# Patient Record
Sex: MALE | Race: WHITE | NOT HISPANIC OR LATINO | Employment: OTHER | URBAN - METROPOLITAN AREA
[De-identification: names, ages, dates, MRNs, and addresses within clinical notes are randomized per-mention and may not be internally consistent; named-entity substitution may affect disease eponyms.]

---

## 2023-03-14 ENCOUNTER — TELEPHONE (OUTPATIENT)
Dept: PRIMARY CARE | Facility: CLINIC | Age: 73
End: 2023-03-14
Payer: MEDICARE

## 2023-10-13 ENCOUNTER — TELEPHONE (OUTPATIENT)
Dept: HEMATOLOGY/ONCOLOGY | Facility: CLINIC | Age: 73
End: 2023-10-13
Payer: MEDICARE

## 2023-10-13 DIAGNOSIS — C61 MALIGNANT NEOPLASM OF PROSTATE (MULTI): Primary | ICD-10-CM

## 2023-10-13 RX ORDER — ENZALUTAMIDE 40 MG/1
160 CAPSULE ORAL DAILY
COMMUNITY
Start: 2023-02-05 | End: 2023-10-13 | Stop reason: SDUPTHER

## 2023-10-13 RX ORDER — ENZALUTAMIDE 40 MG/1
160 CAPSULE ORAL DAILY
Qty: 120 CAPSULE | Refills: 3 | Status: SHIPPED
Start: 2023-10-13 | End: 2023-10-17 | Stop reason: SDUPTHER

## 2023-10-13 NOTE — PROGRESS NOTES
Jose George is a 73 y.o. year old male patient who had a patient care encounter with Suzette Cuadra PA-C on 9/8/23 for ongoing management of Malignant neoplasm of prostate (CMS/HCC) [C61] .  Jose is currently being treated with enzalutamide and leuprolide.    Labs from 9/6/23:  WBC 4.1, ANC 2.45, H/H 13.3/38.8, PLTs 254;  CMP stable.  PSA 0.27.  Continue current therapy.    Per collaborative practice agreement with Dr. Garza, on 10/13/23  I refilled enzalutamide 160 mg (4 x 40 mg) PO once daily, 30 day continuous cycle.  Qty # 120 with 3 refill.  The prescription was sent to Dr. Garza for approval, pending subsequent electronic transmission to [facility].    Jose is currently enrolled in a 's drug program through 12/31/23.    Next FUV is 12/1/23.    Carroll Scott RPh, MS, BCOP  Clinical Pharmacist Specialist - Ambulatory Oncology

## 2023-10-17 DIAGNOSIS — C61 MALIGNANT NEOPLASM OF PROSTATE (MULTI): ICD-10-CM

## 2023-10-17 RX ORDER — ENZALUTAMIDE 40 MG/1
160 CAPSULE ORAL DAILY
Qty: 120 CAPSULE | Refills: 3 | Status: SHIPPED | OUTPATIENT
Start: 2023-10-17

## 2023-10-18 ENCOUNTER — TELEPHONE (OUTPATIENT)
Dept: HEMATOLOGY/ONCOLOGY | Facility: CLINIC | Age: 73
End: 2023-10-18
Payer: MEDICARE

## 2023-10-18 PROBLEM — C61: Status: ACTIVE | Noted: 2023-10-18

## 2023-10-18 PROBLEM — R35.1 NOCTURIA: Status: ACTIVE | Noted: 2023-10-18

## 2023-10-18 PROBLEM — R35.0 INCREASED URINARY FREQUENCY: Status: ACTIVE | Noted: 2023-10-18

## 2023-10-18 PROBLEM — C79.51: Status: ACTIVE | Noted: 2023-10-18

## 2023-10-18 PROBLEM — R58 ECCHYMOSIS: Status: ACTIVE | Noted: 2023-10-18

## 2023-10-18 PROBLEM — T14.8XXA BRUISING: Status: ACTIVE | Noted: 2023-10-18

## 2023-10-18 PROBLEM — I82.409 DVT, LOWER EXTREMITY (MULTI): Status: ACTIVE | Noted: 2023-10-18

## 2023-10-18 RX ORDER — AMOXICILLIN 500 MG/1
2000 TABLET, FILM COATED ORAL
COMMUNITY
Start: 2023-06-29 | End: 2024-02-01 | Stop reason: ALTCHOICE

## 2023-10-18 RX ORDER — LEUPROLIDE ACETATE 45 MG
KIT INTRAMUSCULAR
COMMUNITY
Start: 2022-05-19

## 2023-10-18 RX ORDER — PANTOPRAZOLE SODIUM 20 MG/1
TABLET, DELAYED RELEASE ORAL
COMMUNITY
Start: 2023-03-27 | End: 2024-02-01 | Stop reason: ALTCHOICE

## 2023-10-18 RX ORDER — MELOXICAM 15 MG/1
TABLET ORAL
COMMUNITY
Start: 2023-03-27 | End: 2024-01-02 | Stop reason: ALTCHOICE

## 2023-10-18 RX ORDER — OXYCODONE HYDROCHLORIDE 5 MG/1
TABLET ORAL
COMMUNITY
Start: 2023-10-12 | End: 2024-02-01 | Stop reason: ALTCHOICE

## 2023-10-18 RX ORDER — NALOXONE HYDROCHLORIDE 4 MG/.1ML
4 SPRAY NASAL AS NEEDED
COMMUNITY
Start: 2023-03-27 | End: 2024-01-02 | Stop reason: ALTCHOICE

## 2023-10-18 RX ORDER — MUPIROCIN 20 MG/G
OINTMENT TOPICAL 2 TIMES DAILY
COMMUNITY
Start: 2023-03-06 | End: 2024-02-01 | Stop reason: ALTCHOICE

## 2023-10-18 RX ORDER — HYDROXYZINE HYDROCHLORIDE 10 MG/1
1-4 TABLET, FILM COATED ORAL NIGHTLY
COMMUNITY
Start: 2023-05-17 | End: 2024-01-02 | Stop reason: ALTCHOICE

## 2023-10-18 RX ORDER — DUPILUMAB 300 MG/2ML
INJECTION, SOLUTION SUBCUTANEOUS ONCE
COMMUNITY
Start: 2022-05-19

## 2023-10-18 RX ORDER — MOMETASONE FUROATE 1 MG/G
CREAM TOPICAL 3 TIMES DAILY
COMMUNITY
Start: 2023-05-17 | End: 2024-02-01 | Stop reason: ALTCHOICE

## 2023-10-18 NOTE — TELEPHONE ENCOUNTER
Spoke with the patient. Provided update from Carroll- pharmacist- below. Pt is aware of the change of pharmacy and will call them today (has phone number) to verify they received the script and set up delivery. Pt had no further questions or concerns at this time.

## 2023-10-19 ENCOUNTER — LAB (OUTPATIENT)
Dept: LAB | Facility: CLINIC | Age: 73
End: 2023-10-19
Payer: MEDICARE

## 2023-10-19 ENCOUNTER — TELEPHONE (OUTPATIENT)
Dept: HEMATOLOGY/ONCOLOGY | Facility: CLINIC | Age: 73
End: 2023-10-19
Payer: MEDICARE

## 2023-10-19 DIAGNOSIS — C61 PROSTATE CANCER (MULTI): ICD-10-CM

## 2023-10-19 DIAGNOSIS — C61 PROSTATE CANCER (MULTI): Primary | ICD-10-CM

## 2023-10-19 LAB
ALBUMIN SERPL BCP-MCNC: 3.8 G/DL (ref 3.4–5)
ALP SERPL-CCNC: 68 U/L (ref 33–136)
ALT SERPL W P-5'-P-CCNC: 7 U/L (ref 10–52)
ANION GAP SERPL CALC-SCNC: 13 MMOL/L (ref 10–20)
AST SERPL W P-5'-P-CCNC: 12 U/L (ref 9–39)
BASOPHILS # BLD AUTO: 0.03 X10*3/UL (ref 0–0.1)
BASOPHILS NFR BLD AUTO: 0.7 %
BILIRUB SERPL-MCNC: 0.5 MG/DL (ref 0–1.2)
BUN SERPL-MCNC: 15 MG/DL (ref 6–23)
CALCIUM SERPL-MCNC: 9.4 MG/DL (ref 8.6–10.3)
CHLORIDE SERPL-SCNC: 103 MMOL/L (ref 98–107)
CO2 SERPL-SCNC: 26 MMOL/L (ref 21–32)
CREAT SERPL-MCNC: 0.8 MG/DL (ref 0.5–1.3)
EOSINOPHIL # BLD AUTO: 0.15 X10*3/UL (ref 0–0.4)
EOSINOPHIL NFR BLD AUTO: 3.5 %
ERYTHROCYTE [DISTWIDTH] IN BLOOD BY AUTOMATED COUNT: 13.4 % (ref 11.5–14.5)
GFR SERPL CREATININE-BSD FRML MDRD: >90 ML/MIN/1.73M*2
GLUCOSE SERPL-MCNC: 96 MG/DL (ref 74–99)
HCT VFR BLD AUTO: 40.7 % (ref 41–52)
HGB BLD-MCNC: 13.7 G/DL (ref 13.5–17.5)
IMM GRANULOCYTES # BLD AUTO: 0.04 X10*3/UL (ref 0–0.5)
IMM GRANULOCYTES NFR BLD AUTO: 0.9 % (ref 0–0.9)
LYMPHOCYTES # BLD AUTO: 0.83 X10*3/UL (ref 0.8–3)
LYMPHOCYTES NFR BLD AUTO: 19.4 %
MCH RBC QN AUTO: 31.1 PG (ref 26–34)
MCHC RBC AUTO-ENTMCNC: 33.7 G/DL (ref 32–36)
MCV RBC AUTO: 93 FL (ref 80–100)
MONOCYTES # BLD AUTO: 0.31 X10*3/UL (ref 0.05–0.8)
MONOCYTES NFR BLD AUTO: 7.2 %
NEUTROPHILS # BLD AUTO: 2.92 X10*3/UL (ref 1.6–5.5)
NEUTROPHILS NFR BLD AUTO: 68.3 %
PLATELET # BLD AUTO: 255 X10*3/UL (ref 150–450)
PMV BLD AUTO: 9.8 FL (ref 7.5–11.5)
POTASSIUM SERPL-SCNC: 4.1 MMOL/L (ref 3.5–5.3)
PROT SERPL-MCNC: 6.6 G/DL (ref 6.4–8.2)
PSA SERPL-MCNC: 0.59 NG/ML
RBC # BLD AUTO: 4.4 X10*6/UL (ref 4.5–5.9)
SODIUM SERPL-SCNC: 138 MMOL/L (ref 136–145)
WBC # BLD AUTO: 4.3 X10*3/UL (ref 4.4–11.3)

## 2023-10-19 PROCEDURE — 84153 ASSAY OF PSA TOTAL: CPT | Mod: CMCLAB

## 2023-10-19 PROCEDURE — 80053 COMPREHEN METABOLIC PANEL: CPT

## 2023-10-19 PROCEDURE — 85025 COMPLETE CBC W/AUTO DIFF WBC: CPT

## 2023-10-19 PROCEDURE — 36415 COLL VENOUS BLD VENIPUNCTURE: CPT

## 2023-10-20 NOTE — TELEPHONE ENCOUNTER
Spoke with the patient. Was told by his surgeon that he just needed to call Dr. Garza and let him know that patient is having an elbow surgery on Wednesday. Pt had no further questions or concerns at this time. Aware of Dr. Garza's response below.

## 2023-11-20 ENCOUNTER — TELEPHONE (OUTPATIENT)
Dept: UROLOGY | Facility: CLINIC | Age: 73
End: 2023-11-20
Payer: MEDICARE

## 2023-11-29 ENCOUNTER — LAB (OUTPATIENT)
Dept: LAB | Facility: CLINIC | Age: 73
End: 2023-11-29
Payer: MEDICARE

## 2023-11-29 DIAGNOSIS — C61 MALIGNANT NEOPLASM OF PROSTATE (MULTI): ICD-10-CM

## 2023-11-29 LAB
ALBUMIN SERPL BCP-MCNC: 4 G/DL (ref 3.4–5)
ALP SERPL-CCNC: 72 U/L (ref 33–136)
ALT SERPL W P-5'-P-CCNC: 6 U/L (ref 10–52)
ANION GAP SERPL CALC-SCNC: 11 MMOL/L (ref 10–20)
AST SERPL W P-5'-P-CCNC: 11 U/L (ref 9–39)
BASOPHILS # BLD AUTO: 0.03 X10*3/UL (ref 0–0.1)
BASOPHILS NFR BLD AUTO: 0.7 %
BILIRUB SERPL-MCNC: 0.5 MG/DL (ref 0–1.2)
BUN SERPL-MCNC: 20 MG/DL (ref 6–23)
CALCIUM SERPL-MCNC: 9.5 MG/DL (ref 8.6–10.3)
CHLORIDE SERPL-SCNC: 102 MMOL/L (ref 98–107)
CO2 SERPL-SCNC: 28 MMOL/L (ref 21–32)
CREAT SERPL-MCNC: 0.89 MG/DL (ref 0.5–1.3)
EOSINOPHIL # BLD AUTO: 0.19 X10*3/UL (ref 0–0.4)
EOSINOPHIL NFR BLD AUTO: 4.5 %
ERYTHROCYTE [DISTWIDTH] IN BLOOD BY AUTOMATED COUNT: 12.9 % (ref 11.5–14.5)
GFR SERPL CREATININE-BSD FRML MDRD: >90 ML/MIN/1.73M*2
GLUCOSE SERPL-MCNC: 84 MG/DL (ref 74–99)
HCT VFR BLD AUTO: 41 % (ref 41–52)
HGB BLD-MCNC: 13.8 G/DL (ref 13.5–17.5)
IMM GRANULOCYTES # BLD AUTO: 0.02 X10*3/UL (ref 0–0.5)
IMM GRANULOCYTES NFR BLD AUTO: 0.5 % (ref 0–0.9)
LYMPHOCYTES # BLD AUTO: 1.01 X10*3/UL (ref 0.8–3)
LYMPHOCYTES NFR BLD AUTO: 24 %
MCH RBC QN AUTO: 30.7 PG (ref 26–34)
MCHC RBC AUTO-ENTMCNC: 33.7 G/DL (ref 32–36)
MCV RBC AUTO: 91 FL (ref 80–100)
MONOCYTES # BLD AUTO: 0.48 X10*3/UL (ref 0.05–0.8)
MONOCYTES NFR BLD AUTO: 11.4 %
NEUTROPHILS # BLD AUTO: 2.47 X10*3/UL (ref 1.6–5.5)
NEUTROPHILS NFR BLD AUTO: 58.9 %
PLATELET # BLD AUTO: 253 X10*3/UL (ref 150–450)
POTASSIUM SERPL-SCNC: 4.2 MMOL/L (ref 3.5–5.3)
PROT SERPL-MCNC: 6.7 G/DL (ref 6.4–8.2)
PSA SERPL-MCNC: 1.12 NG/ML
RBC # BLD AUTO: 4.5 X10*6/UL (ref 4.5–5.9)
SODIUM SERPL-SCNC: 137 MMOL/L (ref 136–145)
WBC # BLD AUTO: 4.2 X10*3/UL (ref 4.4–11.3)

## 2023-11-29 PROCEDURE — 85025 COMPLETE CBC W/AUTO DIFF WBC: CPT

## 2023-11-29 PROCEDURE — 84153 ASSAY OF PSA TOTAL: CPT

## 2023-11-29 PROCEDURE — 80053 COMPREHEN METABOLIC PANEL: CPT

## 2023-11-29 PROCEDURE — 36415 COLL VENOUS BLD VENIPUNCTURE: CPT

## 2023-11-30 ENCOUNTER — OFFICE VISIT (OUTPATIENT)
Dept: UROLOGY | Facility: CLINIC | Age: 73
End: 2023-11-30
Payer: MEDICARE

## 2023-11-30 VITALS
WEIGHT: 182 LBS | TEMPERATURE: 97.3 F | DIASTOLIC BLOOD PRESSURE: 75 MMHG | SYSTOLIC BLOOD PRESSURE: 108 MMHG | HEART RATE: 73 BPM | BODY MASS INDEX: 29.38 KG/M2

## 2023-11-30 DIAGNOSIS — C61 NEOPLASM OF PROSTATE, DISTANT METASTASIS STAGING CATEGORY M1B: METASTASIS TO BONE (MULTI): ICD-10-CM

## 2023-11-30 DIAGNOSIS — C79.51 NEOPLASM OF PROSTATE, DISTANT METASTASIS STAGING CATEGORY M1B: METASTASIS TO BONE (MULTI): ICD-10-CM

## 2023-11-30 PROCEDURE — 1036F TOBACCO NON-USER: CPT | Performed by: UROLOGY

## 2023-11-30 PROCEDURE — 99214 OFFICE O/P EST MOD 30 MIN: CPT | Performed by: UROLOGY

## 2023-11-30 PROCEDURE — 96402 CHEMO HORMON ANTINEOPL SQ/IM: CPT | Performed by: UROLOGY

## 2023-11-30 PROCEDURE — 1159F MED LIST DOCD IN RCRD: CPT | Performed by: UROLOGY

## 2023-11-30 NOTE — PROGRESS NOTES
Subjective   Patient ID: Jose George is a 73 y.o. male who presents for lupron injection. Last seen 5/9/23 when Patient received a 6-month Lupron injection today. He will follow-up in the fall for repeat injection. In the interim, he will continue to follow with Dr. Garza from medical oncology.      HPI  The patient is accompanied by his daughter  Most recent PSA is 1.12 on 11/29/23, up from 0.27 on 9/6/23,   The patient reports no new urinary symptoms and denies issues with flow, hematuria, and dysuria.     Patient is still on Xtandi.     Review of Systems  A 12 system review was completed and is negative with the exception of those signs and symptoms noted in the history of present illness.    Objective   Physical Exam  General: in NAD, appears stated age  Head: normocephalic, atraumatic  Respiratory: normal effort, no use of accessory muscles  Cardiovascular: no edema noted  Skin: normal turgor, no rashes  Neurologic: grossly intact, oriented to person/place/time  Psychiatric: mode and affect appropriate     Assessment/Plan   Problem List Items Addressed This Visit             ICD-10-CM       Hematology and Neoplasia    Neoplasm of prostate, distant metastasis staging category M1b: metastasis to bone (CMS/HCC) C61, C79.51    Relevant Medications    leuprolide (6-month) (Lupron Depot) injection 45 mg (Completed) (Start on 11/30/2023  4:30 PM)     Patient is still on Xtandi. Patient will consult with Dr. Garza with his PSA going up. We discussed that patients can become resistant to hormone suppression. Patient will get his Lupron injection today. We will not make a 6 month follow-up at this time. He will consult with Dr. Garza and possible have further Lupron injections there.   All questions and concerns were addressed. Patient verbalizes understanding and has no other questions at this time.      Scribe Attestation  By signing my name below, I, Lucía Cedillo , Leilani   attest that this documentation has been  prepared under the direction and in the presence of Luis Miguel Dumont MD.

## 2023-12-01 ENCOUNTER — OFFICE VISIT (OUTPATIENT)
Dept: HEMATOLOGY/ONCOLOGY | Facility: CLINIC | Age: 73
End: 2023-12-01
Payer: MEDICARE

## 2023-12-01 VITALS
BODY MASS INDEX: 29.43 KG/M2 | HEART RATE: 67 BPM | TEMPERATURE: 98.1 F | DIASTOLIC BLOOD PRESSURE: 84 MMHG | SYSTOLIC BLOOD PRESSURE: 128 MMHG | RESPIRATION RATE: 16 BRPM | OXYGEN SATURATION: 95 % | WEIGHT: 182.32 LBS

## 2023-12-01 DIAGNOSIS — C79.51 PROSTATE CANCER METASTATIC TO BONE (MULTI): ICD-10-CM

## 2023-12-01 DIAGNOSIS — C79.51 NEOPLASM OF PROSTATE, DISTANT METASTASIS STAGING CATEGORY M1B: METASTASIS TO BONE (MULTI): Primary | ICD-10-CM

## 2023-12-01 DIAGNOSIS — Z90.79 ACQUIRED ABSENCE OF OTHER GENITAL ORGAN(S): ICD-10-CM

## 2023-12-01 DIAGNOSIS — L30.9 ECZEMA, UNSPECIFIED TYPE: ICD-10-CM

## 2023-12-01 DIAGNOSIS — C61 NEOPLASM OF PROSTATE, DISTANT METASTASIS STAGING CATEGORY M1B: METASTASIS TO BONE (MULTI): Primary | ICD-10-CM

## 2023-12-01 DIAGNOSIS — I82.532 CHRONIC DEEP VEIN THROMBOSIS (DVT) OF POPLITEAL VEIN OF LEFT LOWER EXTREMITY (MULTI): ICD-10-CM

## 2023-12-01 DIAGNOSIS — C61 PROSTATE CANCER METASTATIC TO BONE (MULTI): ICD-10-CM

## 2023-12-01 PROCEDURE — 99214 OFFICE O/P EST MOD 30 MIN: CPT | Performed by: INTERNAL MEDICINE

## 2023-12-01 PROCEDURE — 1036F TOBACCO NON-USER: CPT | Performed by: INTERNAL MEDICINE

## 2023-12-01 PROCEDURE — 1125F AMNT PAIN NOTED PAIN PRSNT: CPT | Performed by: INTERNAL MEDICINE

## 2023-12-01 PROCEDURE — 1159F MED LIST DOCD IN RCRD: CPT | Performed by: INTERNAL MEDICINE

## 2023-12-01 ASSESSMENT — ENCOUNTER SYMPTOMS
LOSS OF SENSATION IN FEET: 0
OCCASIONAL FEELINGS OF UNSTEADINESS: 0
DEPRESSION: 0

## 2023-12-01 ASSESSMENT — PAIN SCALES - GENERAL: PAINLEVEL: 2

## 2023-12-01 NOTE — PROGRESS NOTES
Patient ID: Jose George is a 73 y.o. male.  Referring Physician: No referring provider defined for this encounter.  Primary Care Provider: Jatin Soriano DO  Visit Type: Follow Up      Subjective    HPI  My PSA went up     Review of Systems   Constitutional: Negative.    HENT:  Negative.     Eyes: Negative.    Respiratory: Negative.     Cardiovascular: Negative.    Gastrointestinal: Negative.    Endocrine: Negative.    Genitourinary: Negative.     Musculoskeletal: Negative.    Skin: Negative.    Neurological: Negative.    Hematological: Negative.    Psychiatric/Behavioral: Negative.          Objective   BSA: 1.96 meters squared  /84   Pulse 67   Temp 36.7 °C (98.1 °F) (Temporal)   Resp 16   Wt 82.7 kg (182 lb 5.1 oz)   SpO2 95%   BMI 29.43 kg/m²      has a past medical history of Allergic contact dermatitis, unspecified cause, Contact with and (suspected) exposure to covid-19 (12/08/2020), Localized edema (03/05/2021), Nodular prostate without lower urinary tract symptoms (03/25/2021), Olecranon bursitis, left elbow (03/05/2021), Personal history of malignant neoplasm of prostate (05/06/2021), Personal history of other diseases of the circulatory system, Personal history of other diseases of the musculoskeletal system and connective tissue, Personal history of other diseases of urinary system (04/26/2021), Personal history of other specified conditions (03/25/2021), and Sprain of unspecified ligament of left ankle, initial encounter (02/23/2021).   has a past surgical history that includes Other surgical history (12/08/2020) and Other surgical history (12/08/2020).  Family History   Problem Relation Name Age of Onset    Diabetes Mother      Hypertension Mother      Heart attack Mother      Obesity Mother      Other (cardiac disorder) Mother      Diabetes Father      Hypertension Father      Other (cardiac disorder) Father      Lung disease Father      Obesity Father       Oncology History    No  history exists.       Jose George  reports that he has never smoked. He has never used smokeless tobacco.  He  reports that he does not currently use alcohol.  He  reports that he does not currently use drugs.    Physical Exam  Vitals reviewed.   HENT:      Head: Normocephalic.      Mouth/Throat:      Mouth: Mucous membranes are moist.   Eyes:      Extraocular Movements: Extraocular movements intact.      Pupils: Pupils are equal, round, and reactive to light.   Cardiovascular:      Rate and Rhythm: Normal rate and regular rhythm.      Pulses: Normal pulses.      Heart sounds: Normal heart sounds.   Pulmonary:      Breath sounds: Normal breath sounds.   Abdominal:      General: Bowel sounds are normal.      Palpations: Abdomen is soft.   Musculoskeletal:         General: Normal range of motion.      Cervical back: Normal range of motion and neck supple.   Skin:     General: Skin is warm and dry.   Neurological:      General: No focal deficit present.      Mental Status: He is alert.   Psychiatric:         Mood and Affect: Mood normal.         Behavior: Behavior normal.         WBC   Date/Time Value Ref Range Status   11/29/2023 10:48 AM 4.2 (L) 4.4 - 11.3 x10*3/uL Final   10/19/2023 10:08 AM 4.3 (L) 4.4 - 11.3 x10*3/uL Final   09/06/2023 11:25 AM 4.1 (L) 4.4 - 11.3 x10E9/L Final   06/07/2023 08:40 AM 3.8 (L) 4.4 - 11.3 x10E9/L Final   02/27/2023 10:08 AM 8.4 4.4 - 11.3 x10E9/L Final     nRBC   Date Value Ref Range Status   01/03/2023 0.0 0.0 - 0.0 /100 WBC Final   08/29/2022 0.0 0.0 - 0.0 /100 WBC Final   07/14/2022 0.0 0.0 - 0.0 /100 WBC Final     RBC   Date Value Ref Range Status   11/29/2023 4.50 4.50 - 5.90 x10*6/uL Final   10/19/2023 4.40 (L) 4.50 - 5.90 x10*6/uL Final   09/06/2023 4.29 (L) 4.50 - 5.90 x10E12/L Final   06/07/2023 4.43 (L) 4.50 - 5.90 x10E12/L Final   02/27/2023 4.70 4.50 - 5.90 x10E12/L Final     Hemoglobin   Date Value Ref Range Status   11/29/2023 13.8 13.5 - 17.5 g/dL Final   10/19/2023  13.7 13.5 - 17.5 g/dL Final   09/06/2023 13.3 (L) 13.5 - 17.5 g/dL Final   06/07/2023 13.5 13.5 - 17.5 g/dL Final   02/27/2023 14.4 13.5 - 17.5 g/dL Final     Hematocrit   Date Value Ref Range Status   11/29/2023 41.0 41.0 - 52.0 % Final   10/19/2023 40.7 (L) 41.0 - 52.0 % Final   09/06/2023 38.8 (L) 41.0 - 52.0 % Final   06/07/2023 39.9 (L) 41.0 - 52.0 % Final   02/27/2023 42.9 41.0 - 52.0 % Final     MCV   Date/Time Value Ref Range Status   11/29/2023 10:48 AM 91 80 - 100 fL Final   10/19/2023 10:08 AM 93 80 - 100 fL Final   09/06/2023 11:25 AM 90 80 - 100 fL Final   06/07/2023 08:40 AM 90 80 - 100 fL Final   02/27/2023 10:08 AM 91 80 - 100 fL Final     MCH   Date/Time Value Ref Range Status   11/29/2023 10:48 AM 30.7 26.0 - 34.0 pg Final   10/19/2023 10:08 AM 31.1 26.0 - 34.0 pg Final     MCHC   Date/Time Value Ref Range Status   11/29/2023 10:48 AM 33.7 32.0 - 36.0 g/dL Final   10/19/2023 10:08 AM 33.7 32.0 - 36.0 g/dL Final   09/06/2023 11:25 AM 34.3 32.0 - 36.0 g/dL Final   06/07/2023 08:40 AM 33.8 32.0 - 36.0 g/dL Final   02/27/2023 10:08 AM 33.6 32.0 - 36.0 g/dL Final     RDW   Date/Time Value Ref Range Status   11/29/2023 10:48 AM 12.9 11.5 - 14.5 % Final   10/19/2023 10:08 AM 13.4 11.5 - 14.5 % Final   09/06/2023 11:25 AM 14.0 11.5 - 14.5 % Final   06/07/2023 08:40 AM 12.7 11.5 - 14.5 % Final   02/27/2023 10:08 AM 13.0 11.5 - 14.5 % Final     Platelets   Date/Time Value Ref Range Status   11/29/2023 10:48  150 - 450 x10*3/uL Final   10/19/2023 10:08  150 - 450 x10*3/uL Final   09/06/2023 11:25  150 - 450 x10E9/L Final   06/07/2023 08:40  150 - 450 x10E9/L Final   02/27/2023 10:08  150 - 450 x10E9/L Final     MPV   Date/Time Value Ref Range Status   10/19/2023 10:08 AM 9.8 7.5 - 11.5 fL Final     Neutrophils %   Date/Time Value Ref Range Status   11/29/2023 10:48 AM 58.9 40.0 - 80.0 % Final   10/19/2023 10:08 AM 68.3 40.0 - 80.0 % Final   09/06/2023 11:25 AM 59.8 40.0 - 80.0 %  Final   06/07/2023 08:40 AM 54.7 40.0 - 80.0 % Final   02/27/2023 10:08 AM 83.8 40.0 - 80.0 % Final     Immature Granulocytes %, Automated   Date/Time Value Ref Range Status   11/29/2023 10:48 AM 0.5 0.0 - 0.9 % Final     Comment:     Immature Granulocyte Count (IG) includes promyelocytes, myelocytes and metamyelocytes but does not include bands. Percent differential counts (%) should be interpreted in the context of the absolute cell counts (cells/UL).   10/19/2023 10:08 AM 0.9 0.0 - 0.9 % Final     Comment:     Immature Granulocyte Count (IG) includes promyelocytes, myelocytes and metamyelocytes but does not include bands. Percent differential counts (%) should be interpreted in the context of the absolute cell counts (cells/UL).   09/06/2023 11:25 AM 0.2 0.0 - 0.9 % Final     Comment:      Immature Granulocyte Count (IG) includes promyelocytes,    myelocytes and metamyelocytes but does not include bands.   Percent differential counts (%) should be interpreted in the   context of the absolute cell counts (cells/L).     06/07/2023 08:40 AM 0.5 0.0 - 0.9 % Final     Comment:      Immature Granulocyte Count (IG) includes promyelocytes,    myelocytes and metamyelocytes but does not include bands.   Percent differential counts (%) should be interpreted in the   context of the absolute cell counts (cells/L).     02/27/2023 10:08 AM 0.5 0.0 - 0.9 % Final     Comment:      Immature Granulocyte Count (IG) includes promyelocytes,    myelocytes and metamyelocytes but does not include bands.   Percent differential counts (%) should be interpreted in the   context of the absolute cell counts (cells/L).       Lymphocytes %   Date/Time Value Ref Range Status   11/29/2023 10:48 AM 24.0 13.0 - 44.0 % Final   10/19/2023 10:08 AM 19.4 13.0 - 44.0 % Final   09/06/2023 11:25 AM 26.1 13.0 - 44.0 % Final   06/07/2023 08:40 AM 25.3 13.0 - 44.0 % Final   02/27/2023 10:08 AM 9.0 13.0 - 44.0 % Final     Monocytes %   Date/Time Value Ref  Range Status   11/29/2023 10:48 AM 11.4 2.0 - 10.0 % Final   10/19/2023 10:08 AM 7.2 2.0 - 10.0 % Final   09/06/2023 11:25 AM 9.3 2.0 - 10.0 % Final   06/07/2023 08:40 AM 9.8 2.0 - 10.0 % Final   02/27/2023 10:08 AM 4.2 2.0 - 10.0 % Final     Eosinophils %   Date/Time Value Ref Range Status   11/29/2023 10:48 AM 4.5 0.0 - 6.0 % Final   10/19/2023 10:08 AM 3.5 0.0 - 6.0 % Final   09/06/2023 11:25 AM 4.1 0.0 - 6.0 % Final   06/07/2023 08:40 AM 9.2 0.0 - 6.0 % Final   02/27/2023 10:08 AM 2.1 0.0 - 6.0 % Final     Basophils %   Date/Time Value Ref Range Status   11/29/2023 10:48 AM 0.7 0.0 - 2.0 % Final   10/19/2023 10:08 AM 0.7 0.0 - 2.0 % Final   09/06/2023 11:25 AM 0.5 0.0 - 2.0 % Final   06/07/2023 08:40 AM 0.5 0.0 - 2.0 % Final   02/27/2023 10:08 AM 0.4 0.0 - 2.0 % Final     Neutrophils Absolute   Date/Time Value Ref Range Status   11/29/2023 10:48 AM 2.47 1.60 - 5.50 x10*3/uL Final     Comment:     Percent differential counts (%) should be interpreted in the context of the absolute cell counts (cells/uL).   10/19/2023 10:08 AM 2.92 1.60 - 5.50 x10*3/uL Final     Comment:     Percent differential counts (%) should be interpreted in the context of the absolute cell counts (cells/uL).   09/06/2023 11:25 AM 2.45 1.60 - 5.50 x10E9/L Final   06/07/2023 08:40 AM 2.07 1.60 - 5.50 x10E9/L Final   02/27/2023 10:08 AM 7.05 (H) 1.60 - 5.50 x10E9/L Final     Immature Granulocytes Absolute, Automated   Date/Time Value Ref Range Status   11/29/2023 10:48 AM 0.02 0.00 - 0.50 x10*3/uL Final   10/19/2023 10:08 AM 0.04 0.00 - 0.50 x10*3/uL Final     Lymphocytes Absolute   Date/Time Value Ref Range Status   11/29/2023 10:48 AM 1.01 0.80 - 3.00 x10*3/uL Final   10/19/2023 10:08 AM 0.83 0.80 - 3.00 x10*3/uL Final   09/06/2023 11:25 AM 1.07 0.80 - 3.00 x10E9/L Final   06/07/2023 08:40 AM 0.96 0.80 - 3.00 x10E9/L Final   02/27/2023 10:08 AM 0.76 (L) 0.80 - 3.00 x10E9/L Final     Monocytes Absolute   Date/Time Value Ref Range Status  "  11/29/2023 10:48 AM 0.48 0.05 - 0.80 x10*3/uL Final   10/19/2023 10:08 AM 0.31 0.05 - 0.80 x10*3/uL Final   09/06/2023 11:25 AM 0.38 0.05 - 0.80 x10E9/L Final   06/07/2023 08:40 AM 0.37 0.05 - 0.80 x10E9/L Final   02/27/2023 10:08 AM 0.35 0.05 - 0.80 x10E9/L Final     Eosinophils Absolute   Date/Time Value Ref Range Status   11/29/2023 10:48 AM 0.19 0.00 - 0.40 x10*3/uL Final   10/19/2023 10:08 AM 0.15 0.00 - 0.40 x10*3/uL Final   09/06/2023 11:25 AM 0.17 0.00 - 0.40 x10E9/L Final   06/07/2023 08:40 AM 0.35 0.00 - 0.40 x10E9/L Final   02/27/2023 10:08 AM 0.18 0.00 - 0.40 x10E9/L Final     Basophils Absolute   Date/Time Value Ref Range Status   11/29/2023 10:48 AM 0.03 0.00 - 0.10 x10*3/uL Final   10/19/2023 10:08 AM 0.03 0.00 - 0.10 x10*3/uL Final   09/06/2023 11:25 AM 0.02 0.00 - 0.10 x10E9/L Final   06/07/2023 08:40 AM 0.02 0.00 - 0.10 x10E9/L Final   02/27/2023 10:08 AM 0.03 0.00 - 0.10 x10E9/L Final       No components found for: \"PT\"  aPTT   Date/Time Value Ref Range Status   05/19/2022 10:56 AM 31 26 - 39 sec Final     Comment:       THE APTT IS NO LONGER USED FOR MONITORING     UNFRACTIONATED HEPARIN THERAPY.    FOR MONITORING HEPARIN THERAPY,     USE THE HEPARIN ASSAY.       Medication Documentation Review Audit       Reviewed by Vee Livingston MA (Medical Assistant) on 12/01/23 at 1357      Medication Order Taking? Sig Documenting Provider Last Dose Status   amoxicillin (Amoxil) 500 mg tablet [id number] Yes Take 4 tablets (2,000 mg) by mouth. one hour before Dentist appointment and take two tablets by mouth six hours after dentist appointment Historical Provider, MD Taking Active   dupilumab (Dupixent Pen) 300 mg/2 mL injection [id number] Yes Inject under the skin 1 time. A month Historical Provider, MD Taking Active   hydrOXYzine HCL (Atarax) 10 mg tablet [id number] No Take 1-4 tablets (10-40 mg) by mouth once daily at bedtime. Historical Provider, MD Not Taking Active   leuprolide (6-month) (Lupron Depot) " "injection 45 mg [id number]   Luis Miguel Dumont MD   23 1603   leuprolide, 6-month, (Lupron Depot, 6 Month,) 45 mg injection [id number] Yes Inject into the muscle every 6 months. Historical Provider, MD Taking Active   meloxicam (Mobic) 15 mg tablet [id number] No  Historical Provider, MD Not Taking Active   mometasone (Elocon) 0.1 % cream [id number] No Apply topically 3 times a day. Historical Provider, MD Not Taking Active   mupirocin (Bactroban) 2 % ointment [id number] No Apply topically 2 times a day. FOR 5 DAYS prior to and including day OF surgery. Historical Provider, MD Not Taking Active   naloxone (Narcan) 4 mg/0.1 mL nasal spray [id number] No Administer 1 spray (4 mg) into affected nostril(s) if needed. for overdose. May repeat every 2 to 3 min in alternating nostrils until medical assistance is available Historical Provider, MD Not Taking Active   NON FORMULARY [id number] Yes COMPOUND DRUG Historical Provider, MD Taking Active   oxyCODONE (Roxicodone) 5 mg immediate release tablet [id number] No  Historical Provider, MD Not Taking Active   pantoprazole (ProtoNix) 20 mg EC tablet [id number] No  Historical Provider, MD Not Taking Active   Xtandi 40 mg capsule [id number] Yes Take 4 capsules (160 mg total) by mouth once daily. Gabe Garza MD Taking Active                   Assessment/Plan    1) prostate cancer  -here for interval followup  -continues to take Xtandi 160 mg, daily  -says sometimes he is \"dizzy\" although he is not actually describing dizziness--does not feel lighthead or that the room is spinning; he says when he is walking, he pitches forward and then doesn't have the strength to force himself to straighten back up  -PSA done on 10/19/2023 was 0.59  -Labs done on 23 included CBC, COMP, PSA  -results reviewed: WBC 4.2, ANC 2470, Hgb 13.8, platelets 253,000, PSA 1.12, creatinine 0.89, AST 11, ALT 6  -last lupron 6 month depot injection received yesterday; pt states he wants to receive " lupron in our office now  -Advised patient to continue Xtandi without change  -will see him again in 6 weeks  -if PSA continues to trend up, will then check PSMA-PET     2) eczema  -on dupixent     3) left leg DVT  -on eliquis  -as he now has been confirmed to have prostate cancer, the cancer was most likely the provoking factor the DVT, as 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     4) bone metastases  -secondary to prostate cancer  -as androgen deprivation therapy is expected to diminish bone density over time, will consider addition of either bisphosphonate (zometa) or RANKL inhibitor (xgeva) to help build back bone density and to minimize risk for pathologic fracture  -he recently fell and broke his right elbow  -will check DEXA  Problem List Items Addressed This Visit             ICD-10-CM    Neoplasm of prostate, distant metastasis staging category M1b: metastasis to bone (CMS/HCC) - Primary C61, C79.51    Relevant Orders    Clinic Appointment Request Follow Up; GABE GARZA; [id number]    CBC and Auto Differential    Comprehensive metabolic panel    Prostate Specific Antigen    Testosterone    XR DEXA bone density     Other Visit Diagnoses         Codes    Acquired absence of other genital organ(s)     Z90.79    Relevant Orders    XR DEXA bone density                 Gabe Garza MD

## 2023-12-02 PROBLEM — C61 PROSTATE CANCER METASTATIC TO BONE (MULTI): Status: ACTIVE | Noted: 2023-12-02

## 2023-12-02 PROBLEM — C79.51 PROSTATE CANCER METASTATIC TO BONE (MULTI): Status: ACTIVE | Noted: 2023-12-02

## 2023-12-02 PROBLEM — L30.9 ECZEMA: Status: ACTIVE | Noted: 2023-12-02

## 2023-12-02 PROBLEM — Z90.79 ACQUIRED ABSENCE OF OTHER GENITAL ORGAN(S): Status: ACTIVE | Noted: 2023-12-02

## 2023-12-02 ASSESSMENT — ENCOUNTER SYMPTOMS
MUSCULOSKELETAL NEGATIVE: 1
NEUROLOGICAL NEGATIVE: 1
EYES NEGATIVE: 1
ENDOCRINE NEGATIVE: 1
CARDIOVASCULAR NEGATIVE: 1
GASTROINTESTINAL NEGATIVE: 1
CONSTITUTIONAL NEGATIVE: 1
PSYCHIATRIC NEGATIVE: 1
RESPIRATORY NEGATIVE: 1
HEMATOLOGIC/LYMPHATIC NEGATIVE: 1

## 2024-01-02 ENCOUNTER — TELEMEDICINE (OUTPATIENT)
Dept: PRIMARY CARE | Facility: CLINIC | Age: 74
End: 2024-01-02
Payer: MEDICARE

## 2024-01-02 NOTE — PROGRESS NOTES
Virtual or Telephone Consent    An interactive audio and video telecommunication system which permits real time communications between the patient (at the originating site) and provider (at the distant site) was utilized to provide this telehealth service.   Verbal consent was requested and obtained from Jose George on this date, 01/02/24 for a telehealth visit.     Visit was canceled because patient was unable to use the camera/video and his phone or computer.    Subjective     Patient ID: [id number]     Jose George is a 73 y.o. male who presents for cough, congestion.    HPI  Symptom onset:   Objective   There were no vitals taken for this visit.   Physical Exam: ***    Assessment/Plan   Problem List Items Addressed This Visit    None      Sandeep Dumont, DO

## 2024-01-10 ENCOUNTER — APPOINTMENT (OUTPATIENT)
Dept: RADIOLOGY | Facility: CLINIC | Age: 74
End: 2024-01-10
Payer: MEDICARE

## 2024-01-12 ENCOUNTER — LAB (OUTPATIENT)
Dept: LAB | Facility: CLINIC | Age: 74
End: 2024-01-12
Payer: MEDICARE

## 2024-01-12 ENCOUNTER — OFFICE VISIT (OUTPATIENT)
Dept: HEMATOLOGY/ONCOLOGY | Facility: CLINIC | Age: 74
End: 2024-01-12
Payer: MEDICARE

## 2024-01-12 VITALS
OXYGEN SATURATION: 97 % | HEART RATE: 58 BPM | TEMPERATURE: 96.4 F | BODY MASS INDEX: 29.53 KG/M2 | RESPIRATION RATE: 18 BRPM | DIASTOLIC BLOOD PRESSURE: 83 MMHG | WEIGHT: 182.98 LBS | SYSTOLIC BLOOD PRESSURE: 124 MMHG

## 2024-01-12 DIAGNOSIS — C79.51 PROSTATE CANCER METASTATIC TO BONE (MULTI): Primary | ICD-10-CM

## 2024-01-12 DIAGNOSIS — L30.9 ECZEMA, UNSPECIFIED TYPE: ICD-10-CM

## 2024-01-12 DIAGNOSIS — C61 PROSTATE CANCER METASTATIC TO BONE (MULTI): Primary | ICD-10-CM

## 2024-01-12 DIAGNOSIS — C61 NEOPLASM OF PROSTATE, DISTANT METASTASIS STAGING CATEGORY M1B: METASTASIS TO BONE (MULTI): ICD-10-CM

## 2024-01-12 DIAGNOSIS — I82.532 CHRONIC DEEP VEIN THROMBOSIS (DVT) OF POPLITEAL VEIN OF LEFT LOWER EXTREMITY (MULTI): ICD-10-CM

## 2024-01-12 DIAGNOSIS — C79.51 NEOPLASM OF PROSTATE, DISTANT METASTASIS STAGING CATEGORY M1B: METASTASIS TO BONE (MULTI): ICD-10-CM

## 2024-01-12 LAB
ALBUMIN SERPL BCP-MCNC: 3.7 G/DL (ref 3.4–5)
ALP SERPL-CCNC: 64 U/L (ref 33–136)
ALT SERPL W P-5'-P-CCNC: 7 U/L (ref 10–52)
ANION GAP SERPL CALC-SCNC: 11 MMOL/L (ref 10–20)
AST SERPL W P-5'-P-CCNC: 12 U/L (ref 9–39)
BASOPHILS # BLD AUTO: 0.01 X10*3/UL (ref 0–0.1)
BASOPHILS NFR BLD AUTO: 0.2 %
BILIRUB SERPL-MCNC: 0.2 MG/DL (ref 0–1.2)
BUN SERPL-MCNC: 20 MG/DL (ref 6–23)
CALCIUM SERPL-MCNC: 9.2 MG/DL (ref 8.6–10.3)
CHLORIDE SERPL-SCNC: 104 MMOL/L (ref 98–107)
CO2 SERPL-SCNC: 28 MMOL/L (ref 21–32)
CREAT SERPL-MCNC: 0.82 MG/DL (ref 0.5–1.3)
EGFRCR SERPLBLD CKD-EPI 2021: >90 ML/MIN/1.73M*2
EOSINOPHIL # BLD AUTO: 0.21 X10*3/UL (ref 0–0.4)
EOSINOPHIL NFR BLD AUTO: 4.2 %
ERYTHROCYTE [DISTWIDTH] IN BLOOD BY AUTOMATED COUNT: 13.1 % (ref 11.5–14.5)
GLUCOSE SERPL-MCNC: 86 MG/DL (ref 74–99)
HCT VFR BLD AUTO: 37.2 % (ref 41–52)
HGB BLD-MCNC: 12.8 G/DL (ref 13.5–17.5)
IMM GRANULOCYTES # BLD AUTO: 0.06 X10*3/UL (ref 0–0.5)
IMM GRANULOCYTES NFR BLD AUTO: 1.2 % (ref 0–0.9)
LYMPHOCYTES # BLD AUTO: 1.11 X10*3/UL (ref 0.8–3)
LYMPHOCYTES NFR BLD AUTO: 22 %
MCH RBC QN AUTO: 30.5 PG (ref 26–34)
MCHC RBC AUTO-ENTMCNC: 34.4 G/DL (ref 32–36)
MCV RBC AUTO: 89 FL (ref 80–100)
MONOCYTES # BLD AUTO: 0.51 X10*3/UL (ref 0.05–0.8)
MONOCYTES NFR BLD AUTO: 10.1 %
NEUTROPHILS # BLD AUTO: 3.14 X10*3/UL (ref 1.6–5.5)
NEUTROPHILS NFR BLD AUTO: 62.3 %
PLATELET # BLD AUTO: 280 X10*3/UL (ref 150–450)
POTASSIUM SERPL-SCNC: 3.7 MMOL/L (ref 3.5–5.3)
PROT SERPL-MCNC: 6.3 G/DL (ref 6.4–8.2)
PSA SERPL-MCNC: 2.61 NG/ML
RBC # BLD AUTO: 4.2 X10*6/UL (ref 4.5–5.9)
SODIUM SERPL-SCNC: 139 MMOL/L (ref 136–145)
TESTOST SERPL-MCNC: <30 NG/DL (ref 240–1000)
WBC # BLD AUTO: 5 X10*3/UL (ref 4.4–11.3)

## 2024-01-12 PROCEDURE — 84403 ASSAY OF TOTAL TESTOSTERONE: CPT

## 2024-01-12 PROCEDURE — 36415 COLL VENOUS BLD VENIPUNCTURE: CPT

## 2024-01-12 PROCEDURE — 84075 ASSAY ALKALINE PHOSPHATASE: CPT

## 2024-01-12 PROCEDURE — 1125F AMNT PAIN NOTED PAIN PRSNT: CPT | Performed by: INTERNAL MEDICINE

## 2024-01-12 PROCEDURE — 1159F MED LIST DOCD IN RCRD: CPT | Performed by: INTERNAL MEDICINE

## 2024-01-12 PROCEDURE — 99214 OFFICE O/P EST MOD 30 MIN: CPT | Performed by: INTERNAL MEDICINE

## 2024-01-12 PROCEDURE — 85025 COMPLETE CBC W/AUTO DIFF WBC: CPT

## 2024-01-12 PROCEDURE — 84153 ASSAY OF PSA TOTAL: CPT

## 2024-01-12 PROCEDURE — 1036F TOBACCO NON-USER: CPT | Performed by: INTERNAL MEDICINE

## 2024-01-12 ASSESSMENT — PAIN SCALES - GENERAL: PAINLEVEL: 1

## 2024-01-12 NOTE — PROGRESS NOTES
Patient ID: Jose George is a 73 y.o. male.  Referring Physician: Gabe Garza MD  [id number] [facility] [address]  [place] [address]  Primary Care Provider: No primary care provider on file.  Visit Type: Follow Up      Subjective    HPI How was my PSA?    Review of Systems   Constitutional: Negative.    HENT:  Negative.     Eyes: Negative.    Respiratory: Negative.     Cardiovascular: Negative.    Gastrointestinal: Negative.    Endocrine: Negative.    Genitourinary: Negative.     Musculoskeletal:  Positive for arthralgias.   Skin: Negative.    Neurological: Negative.    Hematological: Negative.    Psychiatric/Behavioral: Negative.          Objective   BSA: 1.97 meters squared  /83 (BP Location: Right arm, Patient Position: Sitting, BP Cuff Size: Adult)   Pulse 58   Temp 35.8 °C (96.4 °F) (Temporal)   Resp 18   Wt 83 kg (182 lb 15.7 oz)   SpO2 97%   BMI 29.53 kg/m²      has a past medical history of Allergic contact dermatitis, unspecified cause, Contact with and (suspected) exposure to covid-19 (12/08/2020), Localized edema (03/05/2021), Nodular prostate without lower urinary tract symptoms (03/25/2021), Olecranon bursitis, left elbow (03/05/2021), Personal history of malignant neoplasm of prostate (05/06/2021), Personal history of other diseases of the circulatory system, Personal history of other diseases of the musculoskeletal system and connective tissue, Personal history of other diseases of urinary system (04/26/2021), Personal history of other specified conditions (03/25/2021), and Sprain of unspecified ligament of left ankle, initial encounter (02/23/2021).   has a past surgical history that includes Other surgical history (12/08/2020) and Other surgical history (12/08/2020).  Family History   Problem Relation Name Age of Onset    Diabetes Mother      Hypertension Mother      Heart attack Mother      Obesity Mother      Other (cardiac disorder) Mother      Diabetes Father       Hypertension Father      Other (cardiac disorder) Father      Lung disease Father      Obesity Father       Oncology History    No history exists.       Jose George  reports that he has never smoked. He has never used smokeless tobacco.  He  reports that he does not currently use alcohol.  He  reports that he does not currently use drugs.    Physical Exam  Vitals reviewed.   HENT:      Head: Normocephalic.      Mouth/Throat:      Mouth: Mucous membranes are moist.   Eyes:      Extraocular Movements: Extraocular movements intact.      Pupils: Pupils are equal, round, and reactive to light.   Cardiovascular:      Rate and Rhythm: Normal rate and regular rhythm.      Heart sounds: Normal heart sounds.   Pulmonary:      Breath sounds: Normal breath sounds.   Abdominal:      General: Bowel sounds are normal.      Palpations: Abdomen is soft.   Musculoskeletal:         General: Normal range of motion.      Cervical back: Normal range of motion and neck supple.   Skin:     General: Skin is warm.   Neurological:      General: No focal deficit present.      Mental Status: He is alert and oriented to person, place, and time.   Psychiatric:         Mood and Affect: Mood normal.         WBC   Date/Time Value Ref Range Status   01/12/2024 02:10 PM 5.0 4.4 - 11.3 x10*3/uL Final   11/29/2023 10:48 AM 4.2 (L) 4.4 - 11.3 x10*3/uL Final   10/19/2023 10:08 AM 4.3 (L) 4.4 - 11.3 x10*3/uL Final     nRBC   Date Value Ref Range Status   01/03/2023 0.0 0.0 - 0.0 /100 WBC Final   08/29/2022 0.0 0.0 - 0.0 /100 WBC Final   07/14/2022 0.0 0.0 - 0.0 /100 WBC Final     RBC   Date Value Ref Range Status   01/12/2024 4.20 (L) 4.50 - 5.90 x10*6/uL Final   11/29/2023 4.50 4.50 - 5.90 x10*6/uL Final   10/19/2023 4.40 (L) 4.50 - 5.90 x10*6/uL Final     Hemoglobin   Date Value Ref Range Status   01/12/2024 12.8 (L) 13.5 - 17.5 g/dL Final   11/29/2023 13.8 13.5 - 17.5 g/dL Final   10/19/2023 13.7 13.5 - 17.5 g/dL Final     Hematocrit   Date Value  Ref Range Status   01/12/2024 37.2 (L) 41.0 - 52.0 % Final   11/29/2023 41.0 41.0 - 52.0 % Final   10/19/2023 40.7 (L) 41.0 - 52.0 % Final     MCV   Date/Time Value Ref Range Status   01/12/2024 02:10 PM 89 80 - 100 fL Final   11/29/2023 10:48 AM 91 80 - 100 fL Final   10/19/2023 10:08 AM 93 80 - 100 fL Final     MCH   Date/Time Value Ref Range Status   01/12/2024 02:10 PM 30.5 26.0 - 34.0 pg Final   11/29/2023 10:48 AM 30.7 26.0 - 34.0 pg Final   10/19/2023 10:08 AM 31.1 26.0 - 34.0 pg Final     MCHC   Date/Time Value Ref Range Status   01/12/2024 02:10 PM 34.4 32.0 - 36.0 g/dL Final   11/29/2023 10:48 AM 33.7 32.0 - 36.0 g/dL Final   10/19/2023 10:08 AM 33.7 32.0 - 36.0 g/dL Final     RDW   Date/Time Value Ref Range Status   01/12/2024 02:10 PM 13.1 11.5 - 14.5 % Final   11/29/2023 10:48 AM 12.9 11.5 - 14.5 % Final   10/19/2023 10:08 AM 13.4 11.5 - 14.5 % Final     Platelets   Date/Time Value Ref Range Status   01/12/2024 02:10  150 - 450 x10*3/uL Final   11/29/2023 10:48  150 - 450 x10*3/uL Final   10/19/2023 10:08  150 - 450 x10*3/uL Final     MPV   Date/Time Value Ref Range Status   10/19/2023 10:08 AM 9.8 7.5 - 11.5 fL Final     Neutrophils %   Date/Time Value Ref Range Status   01/12/2024 02:10 PM 62.3 40.0 - 80.0 % Final   11/29/2023 10:48 AM 58.9 40.0 - 80.0 % Final   10/19/2023 10:08 AM 68.3 40.0 - 80.0 % Final     Immature Granulocytes %, Automated   Date/Time Value Ref Range Status   01/12/2024 02:10 PM 1.2 (H) 0.0 - 0.9 % Final     Comment:     Immature Granulocyte Count (IG) includes promyelocytes, myelocytes and metamyelocytes but does not include bands. Percent differential counts (%) should be interpreted in the context of the absolute cell counts (cells/UL).   11/29/2023 10:48 AM 0.5 0.0 - 0.9 % Final     Comment:     Immature Granulocyte Count (IG) includes promyelocytes, myelocytes and metamyelocytes but does not include bands. Percent differential counts (%) should be  interpreted in the context of the absolute cell counts (cells/UL).   10/19/2023 10:08 AM 0.9 0.0 - 0.9 % Final     Comment:     Immature Granulocyte Count (IG) includes promyelocytes, myelocytes and metamyelocytes but does not include bands. Percent differential counts (%) should be interpreted in the context of the absolute cell counts (cells/UL).     Lymphocytes %   Date/Time Value Ref Range Status   01/12/2024 02:10 PM 22.0 13.0 - 44.0 % Final   11/29/2023 10:48 AM 24.0 13.0 - 44.0 % Final   10/19/2023 10:08 AM 19.4 13.0 - 44.0 % Final     Monocytes %   Date/Time Value Ref Range Status   01/12/2024 02:10 PM 10.1 2.0 - 10.0 % Final   11/29/2023 10:48 AM 11.4 2.0 - 10.0 % Final   10/19/2023 10:08 AM 7.2 2.0 - 10.0 % Final     Eosinophils %   Date/Time Value Ref Range Status   01/12/2024 02:10 PM 4.2 0.0 - 6.0 % Final   11/29/2023 10:48 AM 4.5 0.0 - 6.0 % Final   10/19/2023 10:08 AM 3.5 0.0 - 6.0 % Final     Basophils %   Date/Time Value Ref Range Status   01/12/2024 02:10 PM 0.2 0.0 - 2.0 % Final   11/29/2023 10:48 AM 0.7 0.0 - 2.0 % Final   10/19/2023 10:08 AM 0.7 0.0 - 2.0 % Final     Neutrophils Absolute   Date/Time Value Ref Range Status   01/12/2024 02:10 PM 3.14 1.60 - 5.50 x10*3/uL Final     Comment:     Percent differential counts (%) should be interpreted in the context of the absolute cell counts (cells/uL).   11/29/2023 10:48 AM 2.47 1.60 - 5.50 x10*3/uL Final     Comment:     Percent differential counts (%) should be interpreted in the context of the absolute cell counts (cells/uL).   10/19/2023 10:08 AM 2.92 1.60 - 5.50 x10*3/uL Final     Comment:     Percent differential counts (%) should be interpreted in the context of the absolute cell counts (cells/uL).     Immature Granulocytes Absolute, Automated   Date/Time Value Ref Range Status   01/12/2024 02:10 PM 0.06 0.00 - 0.50 x10*3/uL Final   11/29/2023 10:48 AM 0.02 0.00 - 0.50 x10*3/uL Final   10/19/2023 10:08 AM 0.04 0.00 - 0.50 x10*3/uL Final  "    Lymphocytes Absolute   Date/Time Value Ref Range Status   01/12/2024 02:10 PM 1.11 0.80 - 3.00 x10*3/uL Final   11/29/2023 10:48 AM 1.01 0.80 - 3.00 x10*3/uL Final   10/19/2023 10:08 AM 0.83 0.80 - 3.00 x10*3/uL Final     Monocytes Absolute   Date/Time Value Ref Range Status   01/12/2024 02:10 PM 0.51 0.05 - 0.80 x10*3/uL Final   11/29/2023 10:48 AM 0.48 0.05 - 0.80 x10*3/uL Final   10/19/2023 10:08 AM 0.31 0.05 - 0.80 x10*3/uL Final     Eosinophils Absolute   Date/Time Value Ref Range Status   01/12/2024 02:10 PM 0.21 0.00 - 0.40 x10*3/uL Final   11/29/2023 10:48 AM 0.19 0.00 - 0.40 x10*3/uL Final   10/19/2023 10:08 AM 0.15 0.00 - 0.40 x10*3/uL Final     Basophils Absolute   Date/Time Value Ref Range Status   01/12/2024 02:10 PM 0.01 0.00 - 0.10 x10*3/uL Final   11/29/2023 10:48 AM 0.03 0.00 - 0.10 x10*3/uL Final   10/19/2023 10:08 AM 0.03 0.00 - 0.10 x10*3/uL Final       No components found for: \"PT\"  aPTT   Date/Time Value Ref Range Status   05/19/2022 10:56 AM 31 26 - 39 sec Final     Comment:       THE APTT IS NO LONGER USED FOR MONITORING     UNFRACTIONATED HEPARIN THERAPY.    FOR MONITORING HEPARIN THERAPY,     USE THE HEPARIN ASSAY.       Medication Documentation Review Audit       Reviewed by Jody Munoz on 01/12/24 at 1440      Medication Order Taking? Sig Documenting Provider Last Dose Status   amoxicillin (Amoxil) 500 mg tablet [id number] No Take 4 tablets (2,000 mg) by mouth. one hour before Dentist appointment and take two tablets by mouth six hours after dentist appointment Historical Provider, MD Not Taking Active   dupilumab (Dupixent Pen) 300 mg/2 mL injection [id number] Yes Inject under the skin 1 time. A month Historical Provider, MD Taking Active   leuprolide, 6-month, (Lupron Depot, 6 Month,) 45 mg injection [id number] Yes Inject into the muscle every 6 months. Historical Provider, MD Taking Active   mometasone (Elocon) 0.1 % cream [id number] No Apply topically 3 times a day. Historical " Provider, MD Not Taking Active   mupirocin (Bactroban) 2 % ointment [id number] No Apply topically 2 times a day. FOR 5 DAYS prior to and including day OF surgery. Historical Provider, MD Not Taking Active   NON FORMULARY [id number]  COMPOUND DRUG Historical Provider, MD  Active   oxyCODONE (Roxicodone) 5 mg immediate release tablet [id number] No  Historical Provider, MD Not Taking Active   pantoprazole (ProtoNix) 20 mg EC tablet [id number] No  Historical Provider, MD Not Taking Active   Xtandi 40 mg capsule [id number] Yes Take 4 capsules (160 mg total) by mouth once daily. Gabe Garza MD Taking Active                   Assessment/Plan    1) prostate cancer  -here for interval followup  -continues to take Xtandi 160 mg, daily  -PSA done on 10/19/2023 was 0.59  -Labs to be done today included CBC, COMP, PSA, testosterone  -results reviewed: WBC 5.0, ANC 3140, Hgb 128, platelets 280,000, PSA 2.61, creatinine 0.82, AST 12, ALT 7, testosterone <30  -last lupron 6 month depot injection received end of November 2023; pt states he wants to receive lupron in our office now--next lupron due end of June 2024  -Advised patient to continue Xtandi without change  -will see him again in 6 weeks  -if PSA exceeds 4, will then check PSMA-PET     2) eczema  -on dupixent     3) left leg DVT  -on eliquis  -as he now has been confirmed to have prostate cancer, the cancer was most likely the provoking factor the DVT, as 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     4) bone metastases  -secondary to prostate cancer  -as androgen deprivation therapy is expected to diminish bone density over time, will consider addition of either bisphosphonate (zometa) or RANKL inhibitor (xgeva) to help build back bone density and to minimize risk for pathologic fracture  -he recently fell and broke his right elbow  -will check DEXA  -DEXA was booked for 1/10/2024 and it says cancelled in the system--will check with radiology     Problem List  Items Addressed This Visit             ICD-10-CM    Neoplasm of prostate, distant metastasis staging category M1b: metastasis to bone (CMS/HCC) C61, C79.51    Relevant Orders    Clinic Appointment Request Follow Up; GABE GARZA; [facility]    CBC and Auto Differential    Comprehensive metabolic panel    Prostate Specific Antigen            Gabe Garza MD

## 2024-01-14 ASSESSMENT — ENCOUNTER SYMPTOMS
CONSTITUTIONAL NEGATIVE: 1
HEMATOLOGIC/LYMPHATIC NEGATIVE: 1
EYES NEGATIVE: 1
NEUROLOGICAL NEGATIVE: 1
GASTROINTESTINAL NEGATIVE: 1
PSYCHIATRIC NEGATIVE: 1
RESPIRATORY NEGATIVE: 1
ENDOCRINE NEGATIVE: 1
CARDIOVASCULAR NEGATIVE: 1
ARTHRALGIAS: 1

## 2024-01-17 ENCOUNTER — APPOINTMENT (OUTPATIENT)
Dept: PRIMARY CARE | Facility: CLINIC | Age: 74
End: 2024-01-17
Payer: MEDICARE

## 2024-02-01 ENCOUNTER — OFFICE VISIT (OUTPATIENT)
Dept: PRIMARY CARE | Facility: CLINIC | Age: 74
End: 2024-02-01
Payer: MEDICARE

## 2024-02-01 ENCOUNTER — HOSPITAL ENCOUNTER (OUTPATIENT)
Dept: RADIOLOGY | Facility: CLINIC | Age: 74
Discharge: HOME | End: 2024-02-01
Payer: MEDICARE

## 2024-02-01 ENCOUNTER — APPOINTMENT (OUTPATIENT)
Dept: PRIMARY CARE | Facility: CLINIC | Age: 74
End: 2024-02-01
Payer: MEDICARE

## 2024-02-01 VITALS
TEMPERATURE: 97.2 F | HEIGHT: 67 IN | SYSTOLIC BLOOD PRESSURE: 112 MMHG | DIASTOLIC BLOOD PRESSURE: 70 MMHG | BODY MASS INDEX: 28.72 KG/M2 | WEIGHT: 182.98 LBS

## 2024-02-01 DIAGNOSIS — C61 NEOPLASM OF PROSTATE, DISTANT METASTASIS STAGING CATEGORY M1B: METASTASIS TO BONE (MULTI): ICD-10-CM

## 2024-02-01 DIAGNOSIS — M25.552 LEFT HIP PAIN: ICD-10-CM

## 2024-02-01 DIAGNOSIS — C79.51 NEOPLASM OF PROSTATE, DISTANT METASTASIS STAGING CATEGORY M1B: METASTASIS TO BONE (MULTI): ICD-10-CM

## 2024-02-01 DIAGNOSIS — Z00.00 ROUTINE GENERAL MEDICAL EXAMINATION AT HEALTH CARE FACILITY: Primary | ICD-10-CM

## 2024-02-01 DIAGNOSIS — C61 PROSTATE CANCER METASTATIC TO BONE (MULTI): ICD-10-CM

## 2024-02-01 DIAGNOSIS — S06.5XAA SUBDURAL HEMATOMA, ACUTE (MULTI): ICD-10-CM

## 2024-02-01 DIAGNOSIS — C79.51 PROSTATE CANCER METASTATIC TO BONE (MULTI): ICD-10-CM

## 2024-02-01 DIAGNOSIS — Z23 NEED FOR VACCINATION: ICD-10-CM

## 2024-02-01 LAB
ALBUMIN SERPL BCP-MCNC: 3.9 G/DL (ref 3.4–5)
ALP SERPL-CCNC: 74 U/L (ref 33–136)
ALT SERPL W P-5'-P-CCNC: 6 U/L (ref 10–52)
ANION GAP SERPL CALC-SCNC: 11 MMOL/L (ref 10–20)
AST SERPL W P-5'-P-CCNC: 12 U/L (ref 9–39)
BASOPHILS # BLD AUTO: 0.04 X10*3/UL (ref 0–0.1)
BASOPHILS NFR BLD AUTO: 1 %
BILIRUB SERPL-MCNC: 0.5 MG/DL (ref 0–1.2)
BUN SERPL-MCNC: 16 MG/DL (ref 6–23)
CALCIUM SERPL-MCNC: 9.4 MG/DL (ref 8.6–10.3)
CHLORIDE SERPL-SCNC: 101 MMOL/L (ref 98–107)
CHOLEST SERPL-MCNC: 147 MG/DL (ref 0–199)
CHOLESTEROL/HDL RATIO: 2.7
CO2 SERPL-SCNC: 28 MMOL/L (ref 21–32)
CREAT SERPL-MCNC: 0.92 MG/DL (ref 0.5–1.3)
EGFRCR SERPLBLD CKD-EPI 2021: 88 ML/MIN/1.73M*2
EOSINOPHIL # BLD AUTO: 0.19 X10*3/UL (ref 0–0.4)
EOSINOPHIL NFR BLD AUTO: 4.9 %
ERYTHROCYTE [DISTWIDTH] IN BLOOD BY AUTOMATED COUNT: 13.3 % (ref 11.5–14.5)
GLUCOSE SERPL-MCNC: 75 MG/DL (ref 74–99)
HCT VFR BLD AUTO: 41.3 % (ref 41–52)
HDLC SERPL-MCNC: 54.6 MG/DL
HGB BLD-MCNC: 13.8 G/DL (ref 13.5–17.5)
IMM GRANULOCYTES # BLD AUTO: 0.03 X10*3/UL (ref 0–0.5)
IMM GRANULOCYTES NFR BLD AUTO: 0.8 % (ref 0–0.9)
LDLC SERPL CALC-MCNC: 74 MG/DL
LYMPHOCYTES # BLD AUTO: 0.94 X10*3/UL (ref 0.8–3)
LYMPHOCYTES NFR BLD AUTO: 24 %
MCH RBC QN AUTO: 30.2 PG (ref 26–34)
MCHC RBC AUTO-ENTMCNC: 33.4 G/DL (ref 32–36)
MCV RBC AUTO: 90 FL (ref 80–100)
MONOCYTES # BLD AUTO: 0.39 X10*3/UL (ref 0.05–0.8)
MONOCYTES NFR BLD AUTO: 10 %
NEUTROPHILS # BLD AUTO: 2.32 X10*3/UL (ref 1.6–5.5)
NEUTROPHILS NFR BLD AUTO: 59.3 %
NON HDL CHOLESTEROL: 92 MG/DL (ref 0–149)
NRBC BLD-RTO: 0 /100 WBCS (ref 0–0)
PLATELET # BLD AUTO: 282 X10*3/UL (ref 150–450)
POTASSIUM SERPL-SCNC: 4.4 MMOL/L (ref 3.5–5.3)
PROT SERPL-MCNC: 6.5 G/DL (ref 6.4–8.2)
RBC # BLD AUTO: 4.57 X10*6/UL (ref 4.5–5.9)
SODIUM SERPL-SCNC: 136 MMOL/L (ref 136–145)
TRIGL SERPL-MCNC: 90 MG/DL (ref 0–149)
TSH SERPL-ACNC: 5.15 MIU/L (ref 0.44–3.98)
VLDL: 18 MG/DL (ref 0–40)
WBC # BLD AUTO: 3.9 X10*3/UL (ref 4.4–11.3)

## 2024-02-01 PROCEDURE — 73502 X-RAY EXAM HIP UNI 2-3 VIEWS: CPT | Mod: LT

## 2024-02-01 PROCEDURE — 90677 PCV20 VACCINE IM: CPT | Performed by: FAMILY MEDICINE

## 2024-02-01 PROCEDURE — 73502 X-RAY EXAM HIP UNI 2-3 VIEWS: CPT | Mod: LEFT SIDE | Performed by: RADIOLOGY

## 2024-02-01 PROCEDURE — 1125F AMNT PAIN NOTED PAIN PRSNT: CPT | Performed by: FAMILY MEDICINE

## 2024-02-01 PROCEDURE — 84443 ASSAY THYROID STIM HORMONE: CPT

## 2024-02-01 PROCEDURE — 36415 COLL VENOUS BLD VENIPUNCTURE: CPT

## 2024-02-01 PROCEDURE — 1160F RVW MEDS BY RX/DR IN RCRD: CPT | Performed by: FAMILY MEDICINE

## 2024-02-01 PROCEDURE — 1170F FXNL STATUS ASSESSED: CPT | Performed by: FAMILY MEDICINE

## 2024-02-01 PROCEDURE — 80061 LIPID PANEL: CPT

## 2024-02-01 PROCEDURE — G0009 ADMIN PNEUMOCOCCAL VACCINE: HCPCS | Performed by: FAMILY MEDICINE

## 2024-02-01 PROCEDURE — 80053 COMPREHEN METABOLIC PANEL: CPT

## 2024-02-01 PROCEDURE — 85025 COMPLETE CBC W/AUTO DIFF WBC: CPT

## 2024-02-01 PROCEDURE — 1159F MED LIST DOCD IN RCRD: CPT | Performed by: FAMILY MEDICINE

## 2024-02-01 PROCEDURE — 83036 HEMOGLOBIN GLYCOSYLATED A1C: CPT

## 2024-02-01 PROCEDURE — 1036F TOBACCO NON-USER: CPT | Performed by: FAMILY MEDICINE

## 2024-02-01 PROCEDURE — G0439 PPPS, SUBSEQ VISIT: HCPCS | Performed by: FAMILY MEDICINE

## 2024-02-01 ASSESSMENT — ENCOUNTER SYMPTOMS
SINUS PAIN: 0
RHINORRHEA: 0
SHORTNESS OF BREATH: 0
HEADACHES: 0
DYSURIA: 0
TROUBLE SWALLOWING: 0
LOSS OF SENSATION IN FEET: 0
SORE THROAT: 0
NUMBNESS: 0
COUGH: 1
VOMITING: 0
BACK PAIN: 0
MYALGIAS: 0
NERVOUS/ANXIOUS: 0
OCCASIONAL FEELINGS OF UNSTEADINESS: 1
CONSTIPATION: 0
WOUND: 0
HEMATURIA: 0
ABDOMINAL PAIN: 0
NAUSEA: 0
DIARRHEA: 0
FATIGUE: 1
SLEEP DISTURBANCE: 0
VOICE CHANGE: 0
FEVER: 0
DIZZINESS: 1
WHEEZING: 0
DEPRESSION: 0
PALPITATIONS: 0
WEAKNESS: 0
BLOOD IN STOOL: 0
ADENOPATHY: 0
DYSPHORIC MOOD: 0

## 2024-02-01 ASSESSMENT — PATIENT HEALTH QUESTIONNAIRE - PHQ9
1. LITTLE INTEREST OR PLEASURE IN DOING THINGS: NOT AT ALL
SUM OF ALL RESPONSES TO PHQ9 QUESTIONS 1 AND 2: 0
2. FEELING DOWN, DEPRESSED OR HOPELESS: NOT AT ALL

## 2024-02-01 ASSESSMENT — ACTIVITIES OF DAILY LIVING (ADL)
DRESSING: INDEPENDENT
TAKING_MEDICATION: INDEPENDENT
MANAGING_FINANCES: INDEPENDENT
GROCERY_SHOPPING: INDEPENDENT
BATHING: INDEPENDENT
DOING_HOUSEWORK: INDEPENDENT
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
## 2024-02-02 LAB
EST. AVERAGE GLUCOSE BLD GHB EST-MCNC: 103 MG/DL
HBA1C MFR BLD: 5.2 %

## 2024-02-04 DIAGNOSIS — R79.89 ABNORMAL TSH: Primary | ICD-10-CM

## 2024-02-04 DIAGNOSIS — E03.9 HYPOTHYROIDISM, UNSPECIFIED TYPE: ICD-10-CM

## 2024-02-05 ENCOUNTER — TELEPHONE (OUTPATIENT)
Dept: PRIMARY CARE | Facility: CLINIC | Age: 74
End: 2024-02-05
Payer: MEDICARE

## 2024-02-05 NOTE — TELEPHONE ENCOUNTER
----- Message from Jatin Soriano, DO sent at 2/4/2024  6:26 PM EST -----  Please let patient know that his labs were normal except that his thyroid labs showed a possibly underactive thyroid.      I recommend that he repeat labs: TSH, Free T3 and Free T4.  These were ordered and can be done at this  building at [address]  He does not have to be fasting for these labs.

## 2024-02-06 ENCOUNTER — LAB (OUTPATIENT)
Dept: LAB | Facility: LAB | Age: 74
End: 2024-02-06
Payer: MEDICARE

## 2024-02-06 DIAGNOSIS — R79.89 ABNORMAL TSH: ICD-10-CM

## 2024-02-06 DIAGNOSIS — E03.9 HYPOTHYROIDISM, UNSPECIFIED TYPE: ICD-10-CM

## 2024-02-06 LAB
T3FREE SERPL-MCNC: 3.5 PG/ML (ref 2.3–4.2)
T4 FREE SERPL-MCNC: 0.82 NG/DL (ref 0.61–1.12)
TSH SERPL-ACNC: 4.63 MIU/L (ref 0.44–3.98)

## 2024-02-06 PROCEDURE — 36415 COLL VENOUS BLD VENIPUNCTURE: CPT

## 2024-02-06 PROCEDURE — 84439 ASSAY OF FREE THYROXINE: CPT

## 2024-02-06 PROCEDURE — 84481 FREE ASSAY (FT-3): CPT

## 2024-02-06 PROCEDURE — 84443 ASSAY THYROID STIM HORMONE: CPT

## 2024-02-19 ENCOUNTER — TELEPHONE (OUTPATIENT)
Dept: HEMATOLOGY/ONCOLOGY | Facility: CLINIC | Age: 74
End: 2024-02-19
Payer: MEDICARE

## 2024-02-21 ENCOUNTER — LAB (OUTPATIENT)
Dept: LAB | Facility: CLINIC | Age: 74
End: 2024-02-21
Payer: MEDICARE

## 2024-02-21 DIAGNOSIS — C79.51 NEOPLASM OF PROSTATE, DISTANT METASTASIS STAGING CATEGORY M1B: METASTASIS TO BONE (MULTI): ICD-10-CM

## 2024-02-21 DIAGNOSIS — C79.51 PROSTATE CANCER METASTATIC TO BONE (MULTI): ICD-10-CM

## 2024-02-21 DIAGNOSIS — C61 NEOPLASM OF PROSTATE, DISTANT METASTASIS STAGING CATEGORY M1B: METASTASIS TO BONE (MULTI): ICD-10-CM

## 2024-02-21 DIAGNOSIS — C61 NEOPLASM OF PROSTATE, DISTANT METASTASIS STAGING CATEGORY M1B: METASTASIS TO BONE (MULTI): Primary | ICD-10-CM

## 2024-02-21 DIAGNOSIS — C61 PROSTATE CANCER METASTATIC TO BONE (MULTI): ICD-10-CM

## 2024-02-21 DIAGNOSIS — C79.51 NEOPLASM OF PROSTATE, DISTANT METASTASIS STAGING CATEGORY M1B: METASTASIS TO BONE (MULTI): Primary | ICD-10-CM

## 2024-02-21 LAB
ALBUMIN SERPL BCP-MCNC: 3.9 G/DL (ref 3.4–5)
ALP SERPL-CCNC: 69 U/L (ref 33–136)
ALT SERPL W P-5'-P-CCNC: 7 U/L (ref 10–52)
ANION GAP SERPL CALC-SCNC: 10 MMOL/L (ref 10–20)
AST SERPL W P-5'-P-CCNC: 12 U/L (ref 9–39)
BASOPHILS # BLD AUTO: 0.02 X10*3/UL (ref 0–0.1)
BASOPHILS NFR BLD AUTO: 0.6 %
BILIRUB SERPL-MCNC: 0.5 MG/DL (ref 0–1.2)
BUN SERPL-MCNC: 17 MG/DL (ref 6–23)
CALCIUM SERPL-MCNC: 9.9 MG/DL (ref 8.6–10.3)
CHLORIDE SERPL-SCNC: 102 MMOL/L (ref 98–107)
CO2 SERPL-SCNC: 29 MMOL/L (ref 21–32)
CREAT SERPL-MCNC: 0.97 MG/DL (ref 0.5–1.3)
EGFRCR SERPLBLD CKD-EPI 2021: 83 ML/MIN/1.73M*2
EOSINOPHIL # BLD AUTO: 0.24 X10*3/UL (ref 0–0.4)
EOSINOPHIL NFR BLD AUTO: 6.7 %
ERYTHROCYTE [DISTWIDTH] IN BLOOD BY AUTOMATED COUNT: 13.6 % (ref 11.5–14.5)
GLUCOSE SERPL-MCNC: 86 MG/DL (ref 74–99)
HCT VFR BLD AUTO: 40.6 % (ref 41–52)
HGB BLD-MCNC: 13.6 G/DL (ref 13.5–17.5)
IMM GRANULOCYTES # BLD AUTO: 0.02 X10*3/UL (ref 0–0.5)
IMM GRANULOCYTES NFR BLD AUTO: 0.6 % (ref 0–0.9)
LYMPHOCYTES # BLD AUTO: 1.01 X10*3/UL (ref 0.8–3)
LYMPHOCYTES NFR BLD AUTO: 28.3 %
MCH RBC QN AUTO: 30 PG (ref 26–34)
MCHC RBC AUTO-ENTMCNC: 33.5 G/DL (ref 32–36)
MCV RBC AUTO: 90 FL (ref 80–100)
MONOCYTES # BLD AUTO: 0.33 X10*3/UL (ref 0.05–0.8)
MONOCYTES NFR BLD AUTO: 9.2 %
NEUTROPHILS # BLD AUTO: 1.95 X10*3/UL (ref 1.6–5.5)
NEUTROPHILS NFR BLD AUTO: 54.6 %
PLATELET # BLD AUTO: 222 X10*3/UL (ref 150–450)
POTASSIUM SERPL-SCNC: 3.9 MMOL/L (ref 3.5–5.3)
PROT SERPL-MCNC: 6.6 G/DL (ref 6.4–8.2)
PSA SERPL-MCNC: 2.99 NG/ML
RBC # BLD AUTO: 4.53 X10*6/UL (ref 4.5–5.9)
SODIUM SERPL-SCNC: 137 MMOL/L (ref 136–145)
WBC # BLD AUTO: 3.6 X10*3/UL (ref 4.4–11.3)

## 2024-02-21 PROCEDURE — 36415 COLL VENOUS BLD VENIPUNCTURE: CPT

## 2024-02-21 PROCEDURE — 84075 ASSAY ALKALINE PHOSPHATASE: CPT

## 2024-02-21 PROCEDURE — 85025 COMPLETE CBC W/AUTO DIFF WBC: CPT

## 2024-02-21 PROCEDURE — 84153 ASSAY OF PSA TOTAL: CPT

## 2024-02-21 NOTE — PROGRESS NOTES
Jose George is a 73 y.o. year old male patient who had a patient care encounter with Dr. Garza on 1/12/24 for ongoing management of his mPC.  Jose is currently being treated with enzalutamide.  He is currently enrolled in a 's drug assistance plan, dispensing pharmacy is [facility].    Results from last 7 days   Lab Units 02/21/24  0912   WBC AUTO x10*3/uL 3.6*   HEMOGLOBIN g/dL 13.6   HEMATOCRIT % 40.6*   PLATELETS AUTO x10*3/uL 222   NEUTROS PCT AUTO % 54.6   LYMPHS PCT AUTO % 28.3   MONOS PCT AUTO % 9.2   EOS PCT AUTO % 6.7      Results from last 7 days   Lab Units 02/21/24  0912   SODIUM mmol/L 137   POTASSIUM mmol/L 3.9   CHLORIDE mmol/L 102   CO2 mmol/L 29   BUN mg/dL 17   CREATININE mg/dL 0.97   CALCIUM mg/dL 9.9   PROTEIN TOTAL g/dL 6.6   BILIRUBIN TOTAL mg/dL 0.5   ALK PHOS U/L 69   ALT U/L 7*   AST U/L 12   GLUCOSE mg/dL 86     PSA from 1/12/24:  2.61 (incr from 1.12 on 11/29/23, team monitoring), testosterone < 30.  Continue current therapy.    Per collaborative practice agreement with Dr. Garza, on 2/21/24 I entered a [facility] treatment plan for  enzalutamide 160 mg (as 4 x 40 mg tabs) PO once daily, 30-day continuous cycle.  Qty # 120 with 5 refills.  The plan was sent to Dr. Garza for approval, pending subsequent electronic transmission to [facility], per the [facility].    Next FUV is 2/23/24.      Carroll Scott, [facility] [place], [place], BCOP  Clinical Pharmacist Specialist - Ambulatory Oncology

## 2024-02-23 ENCOUNTER — OFFICE VISIT (OUTPATIENT)
Dept: HEMATOLOGY/ONCOLOGY | Facility: CLINIC | Age: 74
End: 2024-02-23
Payer: MEDICARE

## 2024-02-23 VITALS
HEART RATE: 57 BPM | SYSTOLIC BLOOD PRESSURE: 121 MMHG | TEMPERATURE: 97.7 F | RESPIRATION RATE: 18 BRPM | BODY MASS INDEX: 29.48 KG/M2 | OXYGEN SATURATION: 97 % | DIASTOLIC BLOOD PRESSURE: 78 MMHG | WEIGHT: 185.41 LBS

## 2024-02-23 DIAGNOSIS — I82.532 CHRONIC DEEP VEIN THROMBOSIS (DVT) OF POPLITEAL VEIN OF LEFT LOWER EXTREMITY (MULTI): ICD-10-CM

## 2024-02-23 DIAGNOSIS — L30.9 ECZEMA, UNSPECIFIED TYPE: Primary | ICD-10-CM

## 2024-02-23 DIAGNOSIS — C61 PROSTATE CANCER METASTATIC TO BONE (MULTI): ICD-10-CM

## 2024-02-23 DIAGNOSIS — C79.51 PROSTATE CANCER METASTATIC TO BONE (MULTI): ICD-10-CM

## 2024-02-23 DIAGNOSIS — T38.0X5A ADVERSE EFFECT OF GLUCOCORTICOIDS AND SYNTHETIC ANALOGUES, INITIAL ENCOUNTER: ICD-10-CM

## 2024-02-23 DIAGNOSIS — C79.51 NEOPLASM OF PROSTATE, DISTANT METASTASIS STAGING CATEGORY M1B: METASTASIS TO BONE (MULTI): ICD-10-CM

## 2024-02-23 DIAGNOSIS — C61 NEOPLASM OF PROSTATE, DISTANT METASTASIS STAGING CATEGORY M1B: METASTASIS TO BONE (MULTI): ICD-10-CM

## 2024-02-23 PROCEDURE — 1159F MED LIST DOCD IN RCRD: CPT | Performed by: INTERNAL MEDICINE

## 2024-02-23 PROCEDURE — 1036F TOBACCO NON-USER: CPT | Performed by: INTERNAL MEDICINE

## 2024-02-23 PROCEDURE — 1160F RVW MEDS BY RX/DR IN RCRD: CPT | Performed by: INTERNAL MEDICINE

## 2024-02-23 PROCEDURE — 99214 OFFICE O/P EST MOD 30 MIN: CPT | Performed by: INTERNAL MEDICINE

## 2024-02-23 PROCEDURE — 1126F AMNT PAIN NOTED NONE PRSNT: CPT | Performed by: INTERNAL MEDICINE

## 2024-02-23 ASSESSMENT — PAIN SCALES - GENERAL: PAINLEVEL: 0-NO PAIN

## 2024-02-23 NOTE — PROGRESS NOTES
Patient ID: Jose George is a 73 y.o. male.  Referring Physician: Gabe Garza MD  [id number]  [place] [address]  Primary Care Provider: Jatin Soriano DO  Visit Type: Follow Up      Subjective    HPI I need to reschedule my DEXA    Review of Systems   Constitutional: Negative.    HENT:  Negative.     Eyes: Negative.    Respiratory: Negative.     Cardiovascular: Negative.    Gastrointestinal: Negative.    Endocrine: Negative.    Genitourinary: Negative.     Musculoskeletal: Negative.    Skin: Negative.    Neurological: Negative.    Hematological: Negative.    Psychiatric/Behavioral: Negative.          Objective   BSA: 1.99 meters squared  /78 (BP Location: Right arm)   Pulse 57   Temp 36.5 °C (97.7 °F) (Temporal)   Resp 18   Wt 84.1 kg (185 lb 6.5 oz)   SpO2 97%   BMI 29.48 kg/m²      has a past medical history of Allergic contact dermatitis, unspecified cause, Contact with and (suspected) exposure to covid-19 (12/08/2020), Localized edema (03/05/2021), Nodular prostate without lower urinary tract symptoms (03/25/2021), Olecranon bursitis, left elbow (03/05/2021), Personal history of malignant neoplasm of prostate (05/06/2021), Personal history of other diseases of the circulatory system, Personal history of other diseases of the musculoskeletal system and connective tissue, Personal history of other diseases of urinary system (04/26/2021), Personal history of other specified conditions (03/25/2021), and Sprain of unspecified ligament of left ankle, initial encounter (02/23/2021).   has a past surgical history that includes Other surgical history (12/08/2020) and Other surgical history (12/08/2020).  Family History   Problem Relation Name Age of Onset    Diabetes Mother      Hypertension Mother      Heart attack Mother      Obesity Mother      Other (cardiac disorder) Mother      Diabetes Father      Hypertension Father      Other (cardiac disorder) Father      Lung  disease Father      Obesity Father       Oncology History   Neoplasm of prostate, distant metastasis staging category M1b: metastasis to bone (CMS/HCC)   10/18/2023 Initial Diagnosis    Neoplasm of prostate, distant metastasis staging category M1b: metastasis to bone (CMS/HCC)     2/21/2024 -  Chemotherapy    Enzalutamide, 84 Day Cycles     Prostate cancer metastatic to bone (CMS/HCC)   12/2/2023 Initial Diagnosis    Prostate cancer metastatic to bone (CMS/HCC)     2/21/2024 -  Chemotherapy    Enzalutamide, 84 Day Cycles         Jose George  reports that he has never smoked. He has never used smokeless tobacco.  He  reports that he does not currently use alcohol.  He  reports that he does not currently use drugs.    Physical Exam  Vitals reviewed.   Constitutional:       Appearance: Normal appearance.   HENT:      Head: Normocephalic.      Mouth/Throat:      Mouth: Mucous membranes are moist.   Eyes:      Extraocular Movements: Extraocular movements intact.   Cardiovascular:      Rate and Rhythm: Normal rate and regular rhythm.      Heart sounds: Normal heart sounds.   Pulmonary:      Breath sounds: Normal breath sounds.   Abdominal:      General: Bowel sounds are normal.      Palpations: Abdomen is soft.   Musculoskeletal:         General: Normal range of motion.      Cervical back: Normal range of motion and neck supple.   Skin:     General: Skin is warm and dry.   Neurological:      General: No focal deficit present.      Mental Status: He is alert and oriented to person, place, and time.   Psychiatric:         Mood and Affect: Mood normal.         Behavior: Behavior normal.         WBC   Date/Time Value Ref Range Status   02/21/2024 09:12 AM 3.6 (L) 4.4 - 11.3 x10*3/uL Final   02/01/2024 09:46 AM 3.9 (L) 4.4 - 11.3 x10*3/uL Final   01/12/2024 02:10 PM 5.0 4.4 - 11.3 x10*3/uL Final     nRBC   Date Value Ref Range Status   02/01/2024 0.0 0.0 - 0.0 /100 WBCs Final   01/03/2023 0.0 0.0 - 0.0 /100 WBC Final    08/29/2022 0.0 0.0 - 0.0 /100 WBC Final   07/14/2022 0.0 0.0 - 0.0 /100 WBC Final     RBC   Date Value Ref Range Status   02/21/2024 4.53 4.50 - 5.90 x10*6/uL Final   02/01/2024 4.57 4.50 - 5.90 x10*6/uL Final   01/12/2024 4.20 (L) 4.50 - 5.90 x10*6/uL Final     Hemoglobin   Date Value Ref Range Status   02/21/2024 13.6 13.5 - 17.5 g/dL Final   02/01/2024 13.8 13.5 - 17.5 g/dL Final   01/12/2024 12.8 (L) 13.5 - 17.5 g/dL Final     Hematocrit   Date Value Ref Range Status   02/21/2024 40.6 (L) 41.0 - 52.0 % Final   02/01/2024 41.3 41.0 - 52.0 % Final   01/12/2024 37.2 (L) 41.0 - 52.0 % Final     MCV   Date/Time Value Ref Range Status   02/21/2024 09:12 AM 90 80 - 100 fL Final   02/01/2024 09:46 AM 90 80 - 100 fL Final   01/12/2024 02:10 PM 89 80 - 100 fL Final     MCH   Date/Time Value Ref Range Status   02/21/2024 09:12 AM 30.0 26.0 - 34.0 pg Final   02/01/2024 09:46 AM 30.2 26.0 - 34.0 pg Final   01/12/2024 02:10 PM 30.5 26.0 - 34.0 pg Final     MCHC   Date/Time Value Ref Range Status   02/21/2024 09:12 AM 33.5 32.0 - 36.0 g/dL Final   02/01/2024 09:46 AM 33.4 32.0 - 36.0 g/dL Final   01/12/2024 02:10 PM 34.4 32.0 - 36.0 g/dL Final     RDW   Date/Time Value Ref Range Status   02/21/2024 09:12 AM 13.6 11.5 - 14.5 % Final   02/01/2024 09:46 AM 13.3 11.5 - 14.5 % Final   01/12/2024 02:10 PM 13.1 11.5 - 14.5 % Final     Platelets   Date/Time Value Ref Range Status   02/21/2024 09:12  150 - 450 x10*3/uL Final   02/01/2024 09:46  150 - 450 x10*3/uL Final   01/12/2024 02:10  150 - 450 x10*3/uL Final     MPV   Date/Time Value Ref Range Status   10/19/2023 10:08 AM 9.8 7.5 - 11.5 fL Final     Neutrophils %   Date/Time Value Ref Range Status   02/21/2024 09:12 AM 54.6 40.0 - 80.0 % Final   02/01/2024 09:46 AM 59.3 40.0 - 80.0 % Final   01/12/2024 02:10 PM 62.3 40.0 - 80.0 % Final     Immature Granulocytes %, Automated   Date/Time Value Ref Range Status   02/21/2024 09:12 AM 0.6 0.0 - 0.9 % Final      Comment:     Immature Granulocyte Count (IG) includes promyelocytes, myelocytes and metamyelocytes but does not include bands. Percent differential counts (%) should be interpreted in the context of the absolute cell counts (cells/UL).   02/01/2024 09:46 AM 0.8 0.0 - 0.9 % Final     Comment:     Immature Granulocyte Count (IG) includes promyelocytes, myelocytes and metamyelocytes but does not include bands. Percent differential counts (%) should be interpreted in the context of the absolute cell counts (cells/UL).   01/12/2024 02:10 PM 1.2 (H) 0.0 - 0.9 % Final     Comment:     Immature Granulocyte Count (IG) includes promyelocytes, myelocytes and metamyelocytes but does not include bands. Percent differential counts (%) should be interpreted in the context of the absolute cell counts (cells/UL).     Lymphocytes %   Date/Time Value Ref Range Status   02/21/2024 09:12 AM 28.3 13.0 - 44.0 % Final   02/01/2024 09:46 AM 24.0 13.0 - 44.0 % Final   01/12/2024 02:10 PM 22.0 13.0 - 44.0 % Final     Monocytes %   Date/Time Value Ref Range Status   02/21/2024 09:12 AM 9.2 2.0 - 10.0 % Final   02/01/2024 09:46 AM 10.0 2.0 - 10.0 % Final   01/12/2024 02:10 PM 10.1 2.0 - 10.0 % Final     Eosinophils %   Date/Time Value Ref Range Status   02/21/2024 09:12 AM 6.7 0.0 - 6.0 % Final   02/01/2024 09:46 AM 4.9 0.0 - 6.0 % Final   01/12/2024 02:10 PM 4.2 0.0 - 6.0 % Final     Basophils %   Date/Time Value Ref Range Status   02/21/2024 09:12 AM 0.6 0.0 - 2.0 % Final   02/01/2024 09:46 AM 1.0 0.0 - 2.0 % Final   01/12/2024 02:10 PM 0.2 0.0 - 2.0 % Final     Neutrophils Absolute   Date/Time Value Ref Range Status   02/21/2024 09:12 AM 1.95 1.60 - 5.50 x10*3/uL Final     Comment:     Percent differential counts (%) should be interpreted in the context of the absolute cell counts (cells/uL).   02/01/2024 09:46 AM 2.32 1.60 - 5.50 x10*3/uL Final     Comment:     Percent differential counts (%) should be interpreted in the context of the  "absolute cell counts (cells/uL).   01/12/2024 02:10 PM 3.14 1.60 - 5.50 x10*3/uL Final     Comment:     Percent differential counts (%) should be interpreted in the context of the absolute cell counts (cells/uL).     Immature Granulocytes Absolute, Automated   Date/Time Value Ref Range Status   02/21/2024 09:12 AM 0.02 0.00 - 0.50 x10*3/uL Final   02/01/2024 09:46 AM 0.03 0.00 - 0.50 x10*3/uL Final   01/12/2024 02:10 PM 0.06 0.00 - 0.50 x10*3/uL Final     Lymphocytes Absolute   Date/Time Value Ref Range Status   02/21/2024 09:12 AM 1.01 0.80 - 3.00 x10*3/uL Final   02/01/2024 09:46 AM 0.94 0.80 - 3.00 x10*3/uL Final   01/12/2024 02:10 PM 1.11 0.80 - 3.00 x10*3/uL Final     Monocytes Absolute   Date/Time Value Ref Range Status   02/21/2024 09:12 AM 0.33 0.05 - 0.80 x10*3/uL Final   02/01/2024 09:46 AM 0.39 0.05 - 0.80 x10*3/uL Final   01/12/2024 02:10 PM 0.51 0.05 - 0.80 x10*3/uL Final     Eosinophils Absolute   Date/Time Value Ref Range Status   02/21/2024 09:12 AM 0.24 0.00 - 0.40 x10*3/uL Final   02/01/2024 09:46 AM 0.19 0.00 - 0.40 x10*3/uL Final   01/12/2024 02:10 PM 0.21 0.00 - 0.40 x10*3/uL Final     Basophils Absolute   Date/Time Value Ref Range Status   02/21/2024 09:12 AM 0.02 0.00 - 0.10 x10*3/uL Final   02/01/2024 09:46 AM 0.04 0.00 - 0.10 x10*3/uL Final   01/12/2024 02:10 PM 0.01 0.00 - 0.10 x10*3/uL Final       No components found for: \"PT\"  aPTT   Date/Time Value Ref Range Status   05/19/2022 10:56 AM 31 26 - 39 sec Final     Comment:       THE APTT IS NO LONGER USED FOR MONITORING     UNFRACTIONATED HEPARIN THERAPY.    FOR MONITORING HEPARIN THERAPY,     USE THE HEPARIN ASSAY.       Medication Documentation Review Audit       Reviewed by Vee Livingston MA (Medical Assistant) on 02/23/24 at 1509      Medication Order Taking? Sig Documenting Provider Last Dose Status   dupilumab (Dupixent Pen) 300 mg/2 mL injection [id number] Yes Inject under the skin 1 time. A month Historical Provider, MD Taking Active "     Discontinued 02/21/24 1657   enzalutamide (Xtandi) 40 mg tablet [id number] Yes Take 4 tablets (160 mg total) by mouth once daily.  Take with or without food at the same time each day. Do not crush, break, or dissolve. Swallow it whole. Gabe Garza MD Taking Active   leuprolide, 6-month, (Lupron Depot, 6 Month,) 45 mg injection [id number] Yes Inject into the muscle every 6 months. Historical Provider, MD Taking Active   Xtandi 40 mg capsule [id number] No Take 4 capsules (160 mg total) by mouth once daily.   Patient not taking: Reported on 2/23/2024    Gabe Garza MD Not Taking Active                   Assessment/Plan       1) prostate cancer  -here for interval followup  -continues to take Xtandi 160 mg, dailly  -Labs done on 2/21/2024 included CBC, COMP, PSA, testosterone  -results reviewed: WBC 3.6, ANC 1950, Hgb 13.6, platelets 222,000, PSA 2.99, creatinine 0.97, AST 12, ALT 7  -last lupron 6 month depot injection received end of November 2023; pt states he wants to receive lupron in our office now--next lupron due end of June 2024  -Advised patient to continue Xtandi without change  -will see him again towards the end of April  -if PSA exceeds 4, will then check PSMA-PET     2) eczema  -on dupixent     3) left leg DVT  -on eliquis  -as he now has been confirmed to have prostate cancer, the cancer was most likely the provoking factor the DVT, as 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     4) bone metastases  -secondary to prostate cancer  -as androgen deprivation therapy is expected to diminish bone density over time, will consider addition of either bisphosphonate (zometa) or RANKL inhibitor (xgeva) to help build back bone density and to minimize risk for pathologic fracture  -he recently fell and broke his right elbow --> injured/twisted his left hip in the process  -DEXA was booked for 1/10/2024 but he cancelled appointment because he caught Covid  -will reorder DEXA  Problem List  Items Addressed This Visit             ICD-10-CM    Neoplasm of prostate, distant metastasis staging category M1b: metastasis to bone (CMS/HCC) C61, C79.51    Relevant Orders    XR DEXA bone density    Clinic Appointment Request Follow Up; GABE GARZA; [facility]    CBC and Auto Differential    Comprehensive metabolic panel    Prostate Specific Antigen     Other Visit Diagnoses         Codes    Adverse effect of glucocorticoids and synthetic analogues, initial encounter     T38.0X5A    Relevant Orders    XR DEXA bone density                 Gabe Garza MD

## 2024-02-23 NOTE — PATIENT INSTRUCTIONS
You will reschedule DEXA    Continue the xtandi    You are due next for lupron in June    See you again at the end of April

## 2024-02-24 PROBLEM — T38.0X5A ADVERSE EFFECT OF GLUCOCORTICOIDS AND SYNTHETIC ANALOGUES, INITIAL ENCOUNTER: Status: ACTIVE | Noted: 2024-02-24

## 2024-02-24 ASSESSMENT — ENCOUNTER SYMPTOMS
CARDIOVASCULAR NEGATIVE: 1
PSYCHIATRIC NEGATIVE: 1
NEUROLOGICAL NEGATIVE: 1
CONSTITUTIONAL NEGATIVE: 1
RESPIRATORY NEGATIVE: 1
HEMATOLOGIC/LYMPHATIC NEGATIVE: 1
ENDOCRINE NEGATIVE: 1
GASTROINTESTINAL NEGATIVE: 1
MUSCULOSKELETAL NEGATIVE: 1
EYES NEGATIVE: 1

## 2024-02-28 ENCOUNTER — TELEPHONE (OUTPATIENT)
Dept: HEMATOLOGY/ONCOLOGY | Facility: CLINIC | Age: 74
End: 2024-02-28
Payer: MEDICARE

## 2024-02-28 NOTE — TELEPHONE ENCOUNTER
Spoke with the patient. States he gets Lupron injection every 6 months and calling to see when next injection is due. Per latest office note- patient sees Dr. Garza in April 2024 and then a scheduling order for his Lupron in June 2024 will be placed. Pt verbalized understanding and had no further questions or concerns at this time.

## 2024-03-08 ENCOUNTER — HOSPITAL ENCOUNTER (OUTPATIENT)
Dept: RADIOLOGY | Facility: CLINIC | Age: 74
Discharge: HOME | End: 2024-03-08
Payer: MEDICARE

## 2024-03-08 DIAGNOSIS — C79.51 NEOPLASM OF PROSTATE, DISTANT METASTASIS STAGING CATEGORY M1B: METASTASIS TO BONE (MULTI): ICD-10-CM

## 2024-03-08 DIAGNOSIS — T38.0X5A ADVERSE EFFECT OF GLUCOCORTICOIDS AND SYNTHETIC ANALOGUES, INITIAL ENCOUNTER: ICD-10-CM

## 2024-03-08 DIAGNOSIS — C61 NEOPLASM OF PROSTATE, DISTANT METASTASIS STAGING CATEGORY M1B: METASTASIS TO BONE (MULTI): ICD-10-CM

## 2024-03-08 PROCEDURE — 77080 DXA BONE DENSITY AXIAL: CPT

## 2024-03-08 PROCEDURE — 77080 DXA BONE DENSITY AXIAL: CPT | Performed by: STUDENT IN AN ORGANIZED HEALTH CARE EDUCATION/TRAINING PROGRAM

## 2024-04-22 ENCOUNTER — LAB (OUTPATIENT)
Dept: LAB | Facility: CLINIC | Age: 74
End: 2024-04-22
Payer: MEDICARE

## 2024-04-22 DIAGNOSIS — C79.51 NEOPLASM OF PROSTATE, DISTANT METASTASIS STAGING CATEGORY M1B: METASTASIS TO BONE (MULTI): ICD-10-CM

## 2024-04-22 DIAGNOSIS — C61 NEOPLASM OF PROSTATE, DISTANT METASTASIS STAGING CATEGORY M1B: METASTASIS TO BONE (MULTI): ICD-10-CM

## 2024-04-22 LAB
ALBUMIN SERPL BCP-MCNC: 4 G/DL (ref 3.4–5)
ALP SERPL-CCNC: 86 U/L (ref 33–136)
ALT SERPL W P-5'-P-CCNC: 7 U/L (ref 10–52)
ANION GAP SERPL CALC-SCNC: 11 MMOL/L (ref 10–20)
AST SERPL W P-5'-P-CCNC: 13 U/L (ref 9–39)
BASOPHILS # BLD AUTO: 0.02 X10*3/UL (ref 0–0.1)
BASOPHILS NFR BLD AUTO: 0.4 %
BILIRUB SERPL-MCNC: 0.5 MG/DL (ref 0–1.2)
BUN SERPL-MCNC: 19 MG/DL (ref 6–23)
CALCIUM SERPL-MCNC: 9.4 MG/DL (ref 8.6–10.3)
CHLORIDE SERPL-SCNC: 104 MMOL/L (ref 98–107)
CO2 SERPL-SCNC: 26 MMOL/L (ref 21–32)
CREAT SERPL-MCNC: 1.08 MG/DL (ref 0.5–1.3)
EGFRCR SERPLBLD CKD-EPI 2021: 73 ML/MIN/1.73M*2
EOSINOPHIL # BLD AUTO: 0.14 X10*3/UL (ref 0–0.4)
EOSINOPHIL NFR BLD AUTO: 3.1 %
ERYTHROCYTE [DISTWIDTH] IN BLOOD BY AUTOMATED COUNT: 13 % (ref 11.5–14.5)
GLUCOSE SERPL-MCNC: 93 MG/DL (ref 74–99)
HCT VFR BLD AUTO: 39.5 % (ref 41–52)
HGB BLD-MCNC: 13.5 G/DL (ref 13.5–17.5)
IMM GRANULOCYTES # BLD AUTO: 0.01 X10*3/UL (ref 0–0.5)
IMM GRANULOCYTES NFR BLD AUTO: 0.2 % (ref 0–0.9)
LYMPHOCYTES # BLD AUTO: 1.1 X10*3/UL (ref 0.8–3)
LYMPHOCYTES NFR BLD AUTO: 24.1 %
MCH RBC QN AUTO: 30.5 PG (ref 26–34)
MCHC RBC AUTO-ENTMCNC: 34.2 G/DL (ref 32–36)
MCV RBC AUTO: 89 FL (ref 80–100)
MONOCYTES # BLD AUTO: 0.37 X10*3/UL (ref 0.05–0.8)
MONOCYTES NFR BLD AUTO: 8.1 %
NEUTROPHILS # BLD AUTO: 2.93 X10*3/UL (ref 1.6–5.5)
NEUTROPHILS NFR BLD AUTO: 64.1 %
PLATELET # BLD AUTO: 241 X10*3/UL (ref 150–450)
POTASSIUM SERPL-SCNC: 4.4 MMOL/L (ref 3.5–5.3)
PROT SERPL-MCNC: 6.7 G/DL (ref 6.4–8.2)
PSA SERPL-MCNC: 7.35 NG/ML
RBC # BLD AUTO: 4.43 X10*6/UL (ref 4.5–5.9)
SODIUM SERPL-SCNC: 137 MMOL/L (ref 136–145)
WBC # BLD AUTO: 4.6 X10*3/UL (ref 4.4–11.3)

## 2024-04-22 PROCEDURE — 36415 COLL VENOUS BLD VENIPUNCTURE: CPT

## 2024-04-22 PROCEDURE — 84153 ASSAY OF PSA TOTAL: CPT

## 2024-04-22 PROCEDURE — 85025 COMPLETE CBC W/AUTO DIFF WBC: CPT

## 2024-04-22 PROCEDURE — 80053 COMPREHEN METABOLIC PANEL: CPT

## 2024-04-26 ENCOUNTER — OFFICE VISIT (OUTPATIENT)
Dept: HEMATOLOGY/ONCOLOGY | Facility: CLINIC | Age: 74
End: 2024-04-26
Payer: MEDICARE

## 2024-04-26 VITALS
BODY MASS INDEX: 28.81 KG/M2 | OXYGEN SATURATION: 96 % | TEMPERATURE: 97.2 F | HEART RATE: 74 BPM | RESPIRATION RATE: 16 BRPM | WEIGHT: 181.22 LBS | SYSTOLIC BLOOD PRESSURE: 108 MMHG | DIASTOLIC BLOOD PRESSURE: 81 MMHG

## 2024-04-26 DIAGNOSIS — M81.0 OSTEOPOROSIS WITHOUT CURRENT PATHOLOGICAL FRACTURE, UNSPECIFIED OSTEOPOROSIS TYPE: ICD-10-CM

## 2024-04-26 DIAGNOSIS — I82.532 CHRONIC DEEP VEIN THROMBOSIS (DVT) OF POPLITEAL VEIN OF LEFT LOWER EXTREMITY (MULTI): ICD-10-CM

## 2024-04-26 DIAGNOSIS — C61 NEOPLASM OF PROSTATE, DISTANT METASTASIS STAGING CATEGORY M1B: METASTASIS TO BONE (MULTI): ICD-10-CM

## 2024-04-26 DIAGNOSIS — L30.9 ECZEMA, UNSPECIFIED TYPE: ICD-10-CM

## 2024-04-26 DIAGNOSIS — C61 PROSTATE CANCER METASTATIC TO BONE (MULTI): Primary | ICD-10-CM

## 2024-04-26 DIAGNOSIS — C79.51 NEOPLASM OF PROSTATE, DISTANT METASTASIS STAGING CATEGORY M1B: METASTASIS TO BONE (MULTI): ICD-10-CM

## 2024-04-26 DIAGNOSIS — C79.51 PROSTATE CANCER METASTATIC TO BONE (MULTI): Primary | ICD-10-CM

## 2024-04-26 PROCEDURE — 1159F MED LIST DOCD IN RCRD: CPT | Performed by: INTERNAL MEDICINE

## 2024-04-26 PROCEDURE — 99214 OFFICE O/P EST MOD 30 MIN: CPT | Performed by: INTERNAL MEDICINE

## 2024-04-26 PROCEDURE — 1160F RVW MEDS BY RX/DR IN RCRD: CPT | Performed by: INTERNAL MEDICINE

## 2024-04-26 PROCEDURE — 1126F AMNT PAIN NOTED NONE PRSNT: CPT | Performed by: INTERNAL MEDICINE

## 2024-04-26 ASSESSMENT — PAIN SCALES - GENERAL: PAINLEVEL: 0-NO PAIN

## 2024-04-26 NOTE — PATIENT INSTRUCTIONS
You will have a PSMA-PET done now    Your next Lupron is due at the end of May    We will discuss formal treatment for your osteoporosis when you return to review Pet results

## 2024-04-27 RX ORDER — ALBUTEROL SULFATE 0.83 MG/ML
3 SOLUTION RESPIRATORY (INHALATION) AS NEEDED
OUTPATIENT
Start: 2024-05-21

## 2024-04-27 RX ORDER — DIPHENHYDRAMINE HYDROCHLORIDE 50 MG/ML
50 INJECTION INTRAMUSCULAR; INTRAVENOUS AS NEEDED
OUTPATIENT
Start: 2024-05-21

## 2024-04-27 RX ORDER — FAMOTIDINE 10 MG/ML
20 INJECTION INTRAVENOUS ONCE AS NEEDED
OUTPATIENT
Start: 2024-05-21

## 2024-04-27 RX ORDER — HEPARIN SODIUM,PORCINE/PF 10 UNIT/ML
50 SYRINGE (ML) INTRAVENOUS AS NEEDED
OUTPATIENT
Start: 2024-04-27

## 2024-04-27 RX ORDER — EPINEPHRINE 0.3 MG/.3ML
0.3 INJECTION SUBCUTANEOUS EVERY 5 MIN PRN
OUTPATIENT
Start: 2024-05-21

## 2024-04-27 RX ORDER — HEPARIN 100 UNIT/ML
500 SYRINGE INTRAVENOUS AS NEEDED
OUTPATIENT
Start: 2024-04-27

## 2024-04-27 ASSESSMENT — ENCOUNTER SYMPTOMS
RESPIRATORY NEGATIVE: 1
PSYCHIATRIC NEGATIVE: 1
NEUROLOGICAL NEGATIVE: 1
GASTROINTESTINAL NEGATIVE: 1
HEMATOLOGIC/LYMPHATIC NEGATIVE: 1
ENDOCRINE NEGATIVE: 1
CONSTITUTIONAL NEGATIVE: 1
CARDIOVASCULAR NEGATIVE: 1
EYES NEGATIVE: 1
MUSCULOSKELETAL NEGATIVE: 1

## 2024-04-27 NOTE — PROGRESS NOTES
Patient ID: Jose George is a 73 y.o. male.  Referring Physician: Gabe Garza MD  [id number]  [place] [address]  Primary Care Provider: Jatin Soriano DO  Visit Type: Follow Up      Subjective    HPI My PSA is climbing    Review of Systems   Constitutional: Negative.    HENT:  Negative.     Eyes: Negative.    Respiratory: Negative.     Cardiovascular: Negative.    Gastrointestinal: Negative.    Endocrine: Negative.    Genitourinary: Negative.     Musculoskeletal: Negative.    Skin: Negative.    Neurological: Negative.    Hematological: Negative.    Psychiatric/Behavioral: Negative.          Objective   BSA: 1.96 meters squared  /81 (BP Location: Right arm, Patient Position: Sitting, BP Cuff Size: Adult)   Pulse 74   Temp 36.2 °C (97.2 °F) (Temporal)   Resp 16   Wt 82.2 kg (181 lb 3.5 oz)   SpO2 96%   BMI 28.81 kg/m²      has a past medical history of Allergic contact dermatitis, unspecified cause, Contact with and (suspected) exposure to covid-19 (12/08/2020), Localized edema (03/05/2021), Nodular prostate without lower urinary tract symptoms (03/25/2021), Olecranon bursitis, left elbow (03/05/2021), Personal history of malignant neoplasm of prostate (05/06/2021), Personal history of other diseases of the circulatory system, Personal history of other diseases of the musculoskeletal system and connective tissue, Personal history of other diseases of urinary system (04/26/2021), Personal history of other specified conditions (03/25/2021), and Sprain of unspecified ligament of left ankle, initial encounter (02/23/2021).   has a past surgical history that includes Other surgical history (12/08/2020) and Other surgical history (12/08/2020).  Family History   Problem Relation Name Age of Onset   • Diabetes Mother     • Hypertension Mother     • Heart attack Mother     • Obesity Mother     • Other (cardiac disorder) Mother     • Diabetes Father     • Hypertension Father     •  Other (cardiac disorder) Father     • Lung disease Father     • Obesity Father       Oncology History   Neoplasm of prostate, distant metastasis staging category M1b: metastasis to bone (Multi)   10/18/2023 Initial Diagnosis    Neoplasm of prostate, distant metastasis staging category M1b: metastasis to bone (CMS/HCC)     2/21/2024 -  Chemotherapy    Enzalutamide, 84 Day Cycles     Prostate cancer metastatic to bone (Multi)   12/2/2023 Initial Diagnosis    Prostate cancer metastatic to bone (CMS/HCC)     2/21/2024 -  Chemotherapy    Enzalutamide, 84 Day Cycles         Jose George  reports that he has never smoked. He has never used smokeless tobacco.  He  reports that he does not currently use alcohol.  He  reports that he does not currently use drugs.    Physical Exam  Vitals reviewed.   Constitutional:       Appearance: Normal appearance.   HENT:      Head: Normocephalic.      Mouth/Throat:      Mouth: Mucous membranes are moist.   Eyes:      Extraocular Movements: Extraocular movements intact.      Pupils: Pupils are equal, round, and reactive to light.   Cardiovascular:      Rate and Rhythm: Normal rate and regular rhythm.      Heart sounds: Normal heart sounds.   Pulmonary:      Breath sounds: Normal breath sounds.   Abdominal:      General: Bowel sounds are normal.      Palpations: Abdomen is soft.   Musculoskeletal:         General: Normal range of motion.      Cervical back: Normal range of motion and neck supple.   Skin:     General: Skin is warm.   Neurological:      General: No focal deficit present.      Mental Status: He is alert and oriented to person, place, and time.   Psychiatric:         Mood and Affect: Mood normal.         Behavior: Behavior normal.       WBC   Date/Time Value Ref Range Status   04/22/2024 11:35 AM 4.6 4.4 - 11.3 x10*3/uL Final   02/21/2024 09:12 AM 3.6 (L) 4.4 - 11.3 x10*3/uL Final   02/01/2024 09:46 AM 3.9 (L) 4.4 - 11.3 x10*3/uL Final     nRBC   Date Value Ref Range  Status   02/01/2024 0.0 0.0 - 0.0 /100 WBCs Final   01/03/2023 0.0 0.0 - 0.0 /100 WBC Final   08/29/2022 0.0 0.0 - 0.0 /100 WBC Final   07/14/2022 0.0 0.0 - 0.0 /100 WBC Final     RBC   Date Value Ref Range Status   04/22/2024 4.43 (L) 4.50 - 5.90 x10*6/uL Final   02/21/2024 4.53 4.50 - 5.90 x10*6/uL Final   02/01/2024 4.57 4.50 - 5.90 x10*6/uL Final     Hemoglobin   Date Value Ref Range Status   04/22/2024 13.5 13.5 - 17.5 g/dL Final   02/21/2024 13.6 13.5 - 17.5 g/dL Final   02/01/2024 13.8 13.5 - 17.5 g/dL Final     Hematocrit   Date Value Ref Range Status   04/22/2024 39.5 (L) 41.0 - 52.0 % Final   02/21/2024 40.6 (L) 41.0 - 52.0 % Final   02/01/2024 41.3 41.0 - 52.0 % Final     MCV   Date/Time Value Ref Range Status   04/22/2024 11:35 AM 89 80 - 100 fL Final   02/21/2024 09:12 AM 90 80 - 100 fL Final   02/01/2024 09:46 AM 90 80 - 100 fL Final     MCH   Date/Time Value Ref Range Status   04/22/2024 11:35 AM 30.5 26.0 - 34.0 pg Final   02/21/2024 09:12 AM 30.0 26.0 - 34.0 pg Final   02/01/2024 09:46 AM 30.2 26.0 - 34.0 pg Final     MCHC   Date/Time Value Ref Range Status   04/22/2024 11:35 AM 34.2 32.0 - 36.0 g/dL Final   02/21/2024 09:12 AM 33.5 32.0 - 36.0 g/dL Final   02/01/2024 09:46 AM 33.4 32.0 - 36.0 g/dL Final     RDW   Date/Time Value Ref Range Status   04/22/2024 11:35 AM 13.0 11.5 - 14.5 % Final   02/21/2024 09:12 AM 13.6 11.5 - 14.5 % Final   02/01/2024 09:46 AM 13.3 11.5 - 14.5 % Final     Platelets   Date/Time Value Ref Range Status   04/22/2024 11:35  150 - 450 x10*3/uL Final   02/21/2024 09:12  150 - 450 x10*3/uL Final   02/01/2024 09:46  150 - 450 x10*3/uL Final     MPV   Date/Time Value Ref Range Status   10/19/2023 10:08 AM 9.8 7.5 - 11.5 fL Final     Neutrophils %   Date/Time Value Ref Range Status   04/22/2024 11:35 AM 64.1 40.0 - 80.0 % Final   02/21/2024 09:12 AM 54.6 40.0 - 80.0 % Final   02/01/2024 09:46 AM 59.3 40.0 - 80.0 % Final     Immature Granulocytes %, Automated    Date/Time Value Ref Range Status   04/22/2024 11:35 AM 0.2 0.0 - 0.9 % Final     Comment:     Immature Granulocyte Count (IG) includes promyelocytes, myelocytes and metamyelocytes but does not include bands. Percent differential counts (%) should be interpreted in the context of the absolute cell counts (cells/UL).   02/21/2024 09:12 AM 0.6 0.0 - 0.9 % Final     Comment:     Immature Granulocyte Count (IG) includes promyelocytes, myelocytes and metamyelocytes but does not include bands. Percent differential counts (%) should be interpreted in the context of the absolute cell counts (cells/UL).   02/01/2024 09:46 AM 0.8 0.0 - 0.9 % Final     Comment:     Immature Granulocyte Count (IG) includes promyelocytes, myelocytes and metamyelocytes but does not include bands. Percent differential counts (%) should be interpreted in the context of the absolute cell counts (cells/UL).     Lymphocytes %   Date/Time Value Ref Range Status   04/22/2024 11:35 AM 24.1 13.0 - 44.0 % Final   02/21/2024 09:12 AM 28.3 13.0 - 44.0 % Final   02/01/2024 09:46 AM 24.0 13.0 - 44.0 % Final     Monocytes %   Date/Time Value Ref Range Status   04/22/2024 11:35 AM 8.1 2.0 - 10.0 % Final   02/21/2024 09:12 AM 9.2 2.0 - 10.0 % Final   02/01/2024 09:46 AM 10.0 2.0 - 10.0 % Final     Eosinophils %   Date/Time Value Ref Range Status   04/22/2024 11:35 AM 3.1 0.0 - 6.0 % Final   02/21/2024 09:12 AM 6.7 0.0 - 6.0 % Final   02/01/2024 09:46 AM 4.9 0.0 - 6.0 % Final     Basophils %   Date/Time Value Ref Range Status   04/22/2024 11:35 AM 0.4 0.0 - 2.0 % Final   02/21/2024 09:12 AM 0.6 0.0 - 2.0 % Final   02/01/2024 09:46 AM 1.0 0.0 - 2.0 % Final     Neutrophils Absolute   Date/Time Value Ref Range Status   04/22/2024 11:35 AM 2.93 1.60 - 5.50 x10*3/uL Final     Comment:     Percent differential counts (%) should be interpreted in the context of the absolute cell counts (cells/uL).   02/21/2024 09:12 AM 1.95 1.60 - 5.50 x10*3/uL Final     Comment:      "Percent differential counts (%) should be interpreted in the context of the absolute cell counts (cells/uL).   02/01/2024 09:46 AM 2.32 1.60 - 5.50 x10*3/uL Final     Comment:     Percent differential counts (%) should be interpreted in the context of the absolute cell counts (cells/uL).     Immature Granulocytes Absolute, Automated   Date/Time Value Ref Range Status   04/22/2024 11:35 AM 0.01 0.00 - 0.50 x10*3/uL Final   02/21/2024 09:12 AM 0.02 0.00 - 0.50 x10*3/uL Final   02/01/2024 09:46 AM 0.03 0.00 - 0.50 x10*3/uL Final     Lymphocytes Absolute   Date/Time Value Ref Range Status   04/22/2024 11:35 AM 1.10 0.80 - 3.00 x10*3/uL Final   02/21/2024 09:12 AM 1.01 0.80 - 3.00 x10*3/uL Final   02/01/2024 09:46 AM 0.94 0.80 - 3.00 x10*3/uL Final     Monocytes Absolute   Date/Time Value Ref Range Status   04/22/2024 11:35 AM 0.37 0.05 - 0.80 x10*3/uL Final   02/21/2024 09:12 AM 0.33 0.05 - 0.80 x10*3/uL Final   02/01/2024 09:46 AM 0.39 0.05 - 0.80 x10*3/uL Final     Eosinophils Absolute   Date/Time Value Ref Range Status   04/22/2024 11:35 AM 0.14 0.00 - 0.40 x10*3/uL Final   02/21/2024 09:12 AM 0.24 0.00 - 0.40 x10*3/uL Final   02/01/2024 09:46 AM 0.19 0.00 - 0.40 x10*3/uL Final     Basophils Absolute   Date/Time Value Ref Range Status   04/22/2024 11:35 AM 0.02 0.00 - 0.10 x10*3/uL Final   02/21/2024 09:12 AM 0.02 0.00 - 0.10 x10*3/uL Final   02/01/2024 09:46 AM 0.04 0.00 - 0.10 x10*3/uL Final       No components found for: \"PT\"  aPTT   Date/Time Value Ref Range Status   05/19/2022 10:56 AM 31 26 - 39 sec Final     Comment:       THE APTT IS NO LONGER USED FOR MONITORING     UNFRACTIONATED HEPARIN THERAPY.    FOR MONITORING HEPARIN THERAPY,     USE THE HEPARIN ASSAY.       Medication Documentation Review Audit       Reviewed by Karolina Gonzales MA (Medical Assistant) on 04/26/24 at 1457      Medication Order Taking? Sig Documenting Provider Last Dose Status   dupilumab (Dupixent Pen) 300 mg/2 mL injection [id number] " Yes Inject under the skin 1 time. A month Historical Provider, MD Taking Active   enzalutamide (Xtandi) 40 mg tablet [id number] Yes Take 4 tablets (160 mg total) by mouth once daily.  Take with or without food at the same time each day. Do not crush, break, or dissolve. Swallow it whole. Gabe Garza MD Taking Active   leuprolide, 6-month, (Lupron Depot, 6 Month,) 45 mg injection [id number] Yes Inject into the muscle every 6 months. Historical Provider, MD Taking Active   Xtandi 40 mg capsule [id number] Yes Take 4 capsules (160 mg total) by mouth once daily. Gabe Garza MD Taking Active                   Assessment/Plan    1) prostate cancer  -here for interval followup  -continues to take Xtandi 160 mg, dailly  -Labs done on 4/22/2024 included CBC, COMP, PSA  -results reviewed: WBC 4.6, Hgb 13.5, platelets 241,000, PSA 7.35, creatinine 1.08, AST 13, ALT 7  -last lupron 6 month depot injection received 11/30/23; pt states he wants to receive lupron in our office now--next lupron due end of May 2024  -Advised patient to continue Xtandi without change for now  -will now check PSMA-PET     2) eczema  -on dupixent     3) left leg DVT  -on eliquis  -as he now has been confirmed to have prostate cancer, the cancer was most likely the provoking factor the DVT, as 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     4) bone metastases  -secondary to prostate cancer  -as androgen deprivation therapy is expected to diminish bone density over time, will consider addition of either bisphosphonate (zometa) or RANKL inhibitor (xgeva) to help build back bone density and to minimize risk for pathologic fracture  -he recently fell and broke his right elbow --> injured/twisted his left hip in the process  -DEXA was booked for 1/10/2024 but he cancelled appointment because he caught Covid  -DEXA done on 3/8/2024 consistent with osteoporosis     Problem List Items Addressed This Visit             ICD-10-CM    Neoplasm of  prostate, distant metastasis staging category M1b: metastasis to bone (Multi) C61, C79.51    Relevant Orders    NM PET CT prostate PSMA    Clinic Appointment Request Follow Up; GABE GARZA; [id number]            Gabe Garza MD

## 2024-05-01 ENCOUNTER — TELEPHONE (OUTPATIENT)
Dept: HEMATOLOGY/ONCOLOGY | Facility: CLINIC | Age: 74
End: 2024-05-01
Payer: MEDICARE

## 2024-05-01 DIAGNOSIS — C61 PROSTATE CANCER METASTATIC TO BONE (MULTI): ICD-10-CM

## 2024-05-01 DIAGNOSIS — C79.51 NEOPLASM OF PROSTATE, DISTANT METASTASIS STAGING CATEGORY M1B: METASTASIS TO BONE (MULTI): Primary | ICD-10-CM

## 2024-05-01 DIAGNOSIS — C79.51 PROSTATE CANCER METASTATIC TO BONE (MULTI): ICD-10-CM

## 2024-05-01 DIAGNOSIS — C61 NEOPLASM OF PROSTATE, DISTANT METASTASIS STAGING CATEGORY M1B: METASTASIS TO BONE (MULTI): Primary | ICD-10-CM

## 2024-05-01 NOTE — TELEPHONE ENCOUNTER
Jose George is a 73 y.o. year old male patient who had a patient care encounter with Dr. Garza on 4/26/24 for ongoing management of Neoplasm of prostate, distant metastasis staging category M1b: metastasis to bone (Multi) [C61, C79.51] .  Jose is currently being treated with enzalutamide + leuprolide.    Labs from 4/22/24:  WBC 4.6, ANC 2.93, H/H 13.5/39.5, PLTs 241;  SCr 1.08, CMP stable;  PSA 7.35 (up from 2.99 on 2/1/24).  Continue current therapy.    Per patient request:  The [facility] has a new process on how they handle patients refills.  Refill RX's must be sent to [facility], and then [facility] transfers the RX to [facility]. Initially they did this with new patients, but this will be for ALL Rxs going forward.  The RX sent directly to [facility] on 2/21 cannot be used. A new RX will need to be sent to [facility], and then they will send this RX to [facility] to be dispensed.      Per collaborative practice agreement with Dr. Garza, on 5/1/24 I refilled enzalutamide 160 mg (4 x 40 mg) PO once daily, 30-day cycle.  Qty # 120 with 5 refills.  As requested, the prescription was sent to [facility], to be forwarded to [facility].    Next FUV is 5/21/24.      Carroll Scott, [facility], MS, BCOP  Clinical Pharmacist Specialist - Ambulatory Oncology

## 2024-05-10 ENCOUNTER — HOSPITAL ENCOUNTER (OUTPATIENT)
Dept: RADIOLOGY | Facility: CLINIC | Age: 74
Discharge: HOME | End: 2024-05-10
Payer: MEDICARE

## 2024-05-10 DIAGNOSIS — C61 NEOPLASM OF PROSTATE, DISTANT METASTASIS STAGING CATEGORY M1B: METASTASIS TO BONE (MULTI): ICD-10-CM

## 2024-05-10 DIAGNOSIS — C79.51 NEOPLASM OF PROSTATE, DISTANT METASTASIS STAGING CATEGORY M1B: METASTASIS TO BONE (MULTI): ICD-10-CM

## 2024-05-10 PROCEDURE — A9596 HC RX 343 DIAGNOSTIC RADIOPHARMACEUTICALS: HCPCS | Performed by: INTERNAL MEDICINE

## 2024-05-10 PROCEDURE — 78815 PET IMAGE W/CT SKULL-THIGH: CPT | Mod: PS

## 2024-05-10 PROCEDURE — 3430000001 HC RX 343 DIAGNOSTIC RADIOPHARMACEUTICALS: Performed by: INTERNAL MEDICINE

## 2024-05-10 PROCEDURE — 78815 PET IMAGE W/CT SKULL-THIGH: CPT | Mod: PET TUMOR SUBSQ TX STRATEGY | Performed by: RADIOLOGY

## 2024-05-10 RX ADMIN — KIT FOR THE PREPARATION OF GALLIUM GA 68 GOZETOTIDE INJECTION 5.52 MILLICURIE: KIT INTRAVENOUS at 12:59

## 2024-05-14 ENCOUNTER — TELEPHONE (OUTPATIENT)
Dept: HEMATOLOGY/ONCOLOGY | Facility: CLINIC | Age: 74
End: 2024-05-14
Payer: MEDICARE

## 2024-05-17 ENCOUNTER — LAB (OUTPATIENT)
Dept: LAB | Facility: CLINIC | Age: 74
End: 2024-05-17
Payer: MEDICARE

## 2024-05-17 DIAGNOSIS — C79.51 NEOPLASM OF PROSTATE, DISTANT METASTASIS STAGING CATEGORY M1B: METASTASIS TO BONE (MULTI): ICD-10-CM

## 2024-05-17 DIAGNOSIS — C61 NEOPLASM OF PROSTATE, DISTANT METASTASIS STAGING CATEGORY M1B: METASTASIS TO BONE (MULTI): ICD-10-CM

## 2024-05-17 DIAGNOSIS — C79.51 PROSTATE CANCER METASTATIC TO BONE (MULTI): ICD-10-CM

## 2024-05-17 DIAGNOSIS — C61 PROSTATE CANCER METASTATIC TO BONE (MULTI): ICD-10-CM

## 2024-05-17 LAB
ALBUMIN SERPL BCP-MCNC: 3.6 G/DL (ref 3.4–5)
ALP SERPL-CCNC: 81 U/L (ref 33–136)
ALT SERPL W P-5'-P-CCNC: 8 U/L (ref 10–52)
ANION GAP SERPL CALC-SCNC: 10 MMOL/L (ref 10–20)
AST SERPL W P-5'-P-CCNC: 14 U/L (ref 9–39)
BASOPHILS # BLD AUTO: 0.02 X10*3/UL (ref 0–0.1)
BASOPHILS NFR BLD AUTO: 0.6 %
BILIRUB SERPL-MCNC: 0.5 MG/DL (ref 0–1.2)
BUN SERPL-MCNC: 17 MG/DL (ref 6–23)
CALCIUM SERPL-MCNC: 9 MG/DL (ref 8.6–10.3)
CHLORIDE SERPL-SCNC: 104 MMOL/L (ref 98–107)
CO2 SERPL-SCNC: 28 MMOL/L (ref 21–32)
CREAT SERPL-MCNC: 0.96 MG/DL (ref 0.5–1.3)
EGFRCR SERPLBLD CKD-EPI 2021: 84 ML/MIN/1.73M*2
EOSINOPHIL # BLD AUTO: 0.15 X10*3/UL (ref 0–0.4)
EOSINOPHIL NFR BLD AUTO: 4.5 %
ERYTHROCYTE [DISTWIDTH] IN BLOOD BY AUTOMATED COUNT: 13.1 % (ref 11.5–14.5)
GLUCOSE SERPL-MCNC: 75 MG/DL (ref 74–99)
HCT VFR BLD AUTO: 40.1 % (ref 41–52)
HGB BLD-MCNC: 13.5 G/DL (ref 13.5–17.5)
IMM GRANULOCYTES # BLD AUTO: 0.02 X10*3/UL (ref 0–0.5)
IMM GRANULOCYTES NFR BLD AUTO: 0.6 % (ref 0–0.9)
LYMPHOCYTES # BLD AUTO: 0.93 X10*3/UL (ref 0.8–3)
LYMPHOCYTES NFR BLD AUTO: 27.8 %
MCH RBC QN AUTO: 30.5 PG (ref 26–34)
MCHC RBC AUTO-ENTMCNC: 33.7 G/DL (ref 32–36)
MCV RBC AUTO: 91 FL (ref 80–100)
MONOCYTES # BLD AUTO: 0.31 X10*3/UL (ref 0.05–0.8)
MONOCYTES NFR BLD AUTO: 9.3 %
NEUTROPHILS # BLD AUTO: 1.92 X10*3/UL (ref 1.6–5.5)
NEUTROPHILS NFR BLD AUTO: 57.2 %
PLATELET # BLD AUTO: 210 X10*3/UL (ref 150–450)
POTASSIUM SERPL-SCNC: 4.1 MMOL/L (ref 3.5–5.3)
PROT SERPL-MCNC: 6.3 G/DL (ref 6.4–8.2)
RBC # BLD AUTO: 4.42 X10*6/UL (ref 4.5–5.9)
SODIUM SERPL-SCNC: 138 MMOL/L (ref 136–145)
WBC # BLD AUTO: 3.4 X10*3/UL (ref 4.4–11.3)

## 2024-05-17 PROCEDURE — 36415 COLL VENOUS BLD VENIPUNCTURE: CPT

## 2024-05-17 PROCEDURE — 85025 COMPLETE CBC W/AUTO DIFF WBC: CPT

## 2024-05-17 PROCEDURE — 84153 ASSAY OF PSA TOTAL: CPT

## 2024-05-17 PROCEDURE — 80053 COMPREHEN METABOLIC PANEL: CPT

## 2024-05-21 ENCOUNTER — OFFICE VISIT (OUTPATIENT)
Dept: HEMATOLOGY/ONCOLOGY | Facility: CLINIC | Age: 74
End: 2024-05-21
Payer: MEDICARE

## 2024-05-21 ENCOUNTER — INFUSION (OUTPATIENT)
Dept: HEMATOLOGY/ONCOLOGY | Facility: CLINIC | Age: 74
End: 2024-05-21
Payer: MEDICARE

## 2024-05-21 VITALS
HEART RATE: 62 BPM | RESPIRATION RATE: 16 BRPM | SYSTOLIC BLOOD PRESSURE: 131 MMHG | DIASTOLIC BLOOD PRESSURE: 87 MMHG | WEIGHT: 187.83 LBS | OXYGEN SATURATION: 96 % | TEMPERATURE: 97.5 F | BODY MASS INDEX: 29.86 KG/M2

## 2024-05-21 DIAGNOSIS — C61 PROSTATE CANCER METASTATIC TO BONE (MULTI): ICD-10-CM

## 2024-05-21 DIAGNOSIS — C79.51 PROSTATE CANCER METASTATIC TO BONE (MULTI): Primary | ICD-10-CM

## 2024-05-21 DIAGNOSIS — C61 PROSTATE CANCER METASTATIC TO BONE (MULTI): Primary | ICD-10-CM

## 2024-05-21 DIAGNOSIS — C79.51 PROSTATE CANCER METASTATIC TO BONE (MULTI): ICD-10-CM

## 2024-05-21 DIAGNOSIS — C61 NEOPLASM OF PROSTATE, DISTANT METASTASIS STAGING CATEGORY M1B: METASTASIS TO BONE (MULTI): ICD-10-CM

## 2024-05-21 DIAGNOSIS — I82.532 CHRONIC DEEP VEIN THROMBOSIS (DVT) OF POPLITEAL VEIN OF LEFT LOWER EXTREMITY (MULTI): ICD-10-CM

## 2024-05-21 DIAGNOSIS — L30.9 ECZEMA, UNSPECIFIED TYPE: ICD-10-CM

## 2024-05-21 DIAGNOSIS — C79.51 NEOPLASM OF PROSTATE, DISTANT METASTASIS STAGING CATEGORY M1B: METASTASIS TO BONE (MULTI): ICD-10-CM

## 2024-05-21 PROCEDURE — 99214 OFFICE O/P EST MOD 30 MIN: CPT | Performed by: INTERNAL MEDICINE

## 2024-05-21 PROCEDURE — 1159F MED LIST DOCD IN RCRD: CPT | Performed by: INTERNAL MEDICINE

## 2024-05-21 PROCEDURE — 2500000004 HC RX 250 GENERAL PHARMACY W/ HCPCS (ALT 636 FOR OP/ED): Mod: JZ | Performed by: INTERNAL MEDICINE

## 2024-05-21 PROCEDURE — 99214 OFFICE O/P EST MOD 30 MIN: CPT | Mod: 25 | Performed by: INTERNAL MEDICINE

## 2024-05-21 PROCEDURE — 1125F AMNT PAIN NOTED PAIN PRSNT: CPT | Performed by: INTERNAL MEDICINE

## 2024-05-21 PROCEDURE — 1160F RVW MEDS BY RX/DR IN RCRD: CPT | Performed by: INTERNAL MEDICINE

## 2024-05-21 PROCEDURE — 96402 CHEMO HORMON ANTINEOPL SQ/IM: CPT

## 2024-05-21 RX ORDER — DIPHENHYDRAMINE HYDROCHLORIDE 50 MG/ML
50 INJECTION INTRAMUSCULAR; INTRAVENOUS AS NEEDED
OUTPATIENT
Start: 2024-11-05

## 2024-05-21 RX ORDER — FAMOTIDINE 10 MG/ML
20 INJECTION INTRAVENOUS ONCE AS NEEDED
OUTPATIENT
Start: 2024-11-05

## 2024-05-21 RX ORDER — EPINEPHRINE 0.3 MG/.3ML
0.3 INJECTION SUBCUTANEOUS EVERY 5 MIN PRN
OUTPATIENT
Start: 2024-11-05

## 2024-05-21 RX ORDER — ALBUTEROL SULFATE 0.83 MG/ML
3 SOLUTION RESPIRATORY (INHALATION) AS NEEDED
OUTPATIENT
Start: 2024-11-05

## 2024-05-21 RX ADMIN — LEUPROLIDE ACETATE 45 MG: KIT at 10:44

## 2024-05-21 ASSESSMENT — ENCOUNTER SYMPTOMS
PSYCHIATRIC NEGATIVE: 1
CONSTITUTIONAL NEGATIVE: 1
HEMATOLOGIC/LYMPHATIC NEGATIVE: 1
NEUROLOGICAL NEGATIVE: 1
GASTROINTESTINAL NEGATIVE: 1
EYES NEGATIVE: 1
ENDOCRINE NEGATIVE: 1
RESPIRATORY NEGATIVE: 1
MUSCULOSKELETAL NEGATIVE: 1
CARDIOVASCULAR NEGATIVE: 1

## 2024-05-21 ASSESSMENT — PAIN SCALES - GENERAL: PAINLEVEL: 2

## 2024-05-21 NOTE — PATIENT INSTRUCTIONS
"Continue the xtandi    You will continue with the lupron    Since you have such limited new disease (L5)--I will refer you to radiation oncologist for \"spot treatment\"    I will see you again in 6 weeks  "

## 2024-05-21 NOTE — PROGRESS NOTES
Patient ID: Jose George is a 73 y.o. male.  Referring Physician: Gabe Garza MD  [id number]  [place] [address]  Primary Care Provider: Jatin Soriano DO  Visit Type: Follow Up      Subjective    HPI How was my PET scan?    Review of Systems   Constitutional: Negative.    HENT:  Negative.     Eyes: Negative.    Respiratory: Negative.     Cardiovascular: Negative.    Gastrointestinal: Negative.    Endocrine: Negative.    Genitourinary: Negative.     Musculoskeletal: Negative.    Skin: Negative.    Neurological: Negative.    Hematological: Negative.    Psychiatric/Behavioral: Negative.          Objective   BSA: 2 meters squared  /87 (BP Location: Right arm)   Pulse 62   Temp 36.4 °C (97.5 °F) (Temporal)   Resp 16   Wt 85.2 kg (187 lb 13.3 oz)   SpO2 96%   BMI 29.86 kg/m²      has a past medical history of Allergic contact dermatitis, unspecified cause, Contact with and (suspected) exposure to covid-19 (12/08/2020), Localized edema (03/05/2021), Nodular prostate without lower urinary tract symptoms (03/25/2021), Olecranon bursitis, left elbow (03/05/2021), Personal history of malignant neoplasm of prostate (05/06/2021), Personal history of other diseases of the circulatory system, Personal history of other diseases of the musculoskeletal system and connective tissue, Personal history of other diseases of urinary system (04/26/2021), Personal history of other specified conditions (03/25/2021), and Sprain of unspecified ligament of left ankle, initial encounter (02/23/2021).   has a past surgical history that includes Other surgical history (12/08/2020) and Other surgical history (12/08/2020).  Family History   Problem Relation Name Age of Onset    Diabetes Mother      Hypertension Mother      Heart attack Mother      Obesity Mother      Other (cardiac disorder) Mother      Diabetes Father      Hypertension Father      Other (cardiac disorder) Father      Lung disease Father       Obesity Father       Oncology History   Neoplasm of prostate, distant metastasis staging category M1b: metastasis to bone (Multi)   10/18/2023 Initial Diagnosis    Neoplasm of prostate, distant metastasis staging category M1b: metastasis to bone (CMS/HCC)     2/21/2024 -  Chemotherapy    Enzalutamide, 84 Day Cycles     Prostate cancer metastatic to bone (Multi)   12/2/2023 Initial Diagnosis    Prostate cancer metastatic to bone (CMS/HCC)     2/21/2024 -  Chemotherapy    Enzalutamide, 84 Day Cycles         Jose George  reports that he has never smoked. He has never used smokeless tobacco.  He  reports that he does not currently use alcohol.  He  reports that he does not currently use drugs.    Physical Exam  Vitals reviewed.   Constitutional:       Appearance: Normal appearance.   HENT:      Head: Normocephalic.      Mouth/Throat:      Mouth: Mucous membranes are moist.   Eyes:      Extraocular Movements: Extraocular movements intact.      Pupils: Pupils are equal, round, and reactive to light.   Cardiovascular:      Rate and Rhythm: Normal rate and regular rhythm.      Heart sounds: Normal heart sounds.   Pulmonary:      Breath sounds: Normal breath sounds.   Abdominal:      General: Bowel sounds are normal.      Palpations: Abdomen is soft.   Musculoskeletal:         General: Normal range of motion.      Cervical back: Normal range of motion and neck supple.   Skin:     General: Skin is warm.   Neurological:      General: No focal deficit present.      Mental Status: He is alert and oriented to person, place, and time.   Psychiatric:         Mood and Affect: Mood normal.         Behavior: Behavior normal.         WBC   Date/Time Value Ref Range Status   05/17/2024 10:27 AM 3.4 (L) 4.4 - 11.3 x10*3/uL Final   04/22/2024 11:35 AM 4.6 4.4 - 11.3 x10*3/uL Final   02/21/2024 09:12 AM 3.6 (L) 4.4 - 11.3 x10*3/uL Final     nRBC   Date Value Ref Range Status   02/01/2024 0.0 0.0 - 0.0 /100 WBCs Final   01/03/2023  0.0 0.0 - 0.0 /100 WBC Final   08/29/2022 0.0 0.0 - 0.0 /100 WBC Final   07/14/2022 0.0 0.0 - 0.0 /100 WBC Final     RBC   Date Value Ref Range Status   05/17/2024 4.42 (L) 4.50 - 5.90 x10*6/uL Final   04/22/2024 4.43 (L) 4.50 - 5.90 x10*6/uL Final   02/21/2024 4.53 4.50 - 5.90 x10*6/uL Final     Hemoglobin   Date Value Ref Range Status   05/17/2024 13.5 13.5 - 17.5 g/dL Final   04/22/2024 13.5 13.5 - 17.5 g/dL Final   02/21/2024 13.6 13.5 - 17.5 g/dL Final     Hematocrit   Date Value Ref Range Status   05/17/2024 40.1 (L) 41.0 - 52.0 % Final   04/22/2024 39.5 (L) 41.0 - 52.0 % Final   02/21/2024 40.6 (L) 41.0 - 52.0 % Final     MCV   Date/Time Value Ref Range Status   05/17/2024 10:27 AM 91 80 - 100 fL Final   04/22/2024 11:35 AM 89 80 - 100 fL Final   02/21/2024 09:12 AM 90 80 - 100 fL Final     MCH   Date/Time Value Ref Range Status   05/17/2024 10:27 AM 30.5 26.0 - 34.0 pg Final   04/22/2024 11:35 AM 30.5 26.0 - 34.0 pg Final   02/21/2024 09:12 AM 30.0 26.0 - 34.0 pg Final     MCHC   Date/Time Value Ref Range Status   05/17/2024 10:27 AM 33.7 32.0 - 36.0 g/dL Final   04/22/2024 11:35 AM 34.2 32.0 - 36.0 g/dL Final   02/21/2024 09:12 AM 33.5 32.0 - 36.0 g/dL Final     RDW   Date/Time Value Ref Range Status   05/17/2024 10:27 AM 13.1 11.5 - 14.5 % Final   04/22/2024 11:35 AM 13.0 11.5 - 14.5 % Final   02/21/2024 09:12 AM 13.6 11.5 - 14.5 % Final     Platelets   Date/Time Value Ref Range Status   05/17/2024 10:27  150 - 450 x10*3/uL Final   04/22/2024 11:35  150 - 450 x10*3/uL Final   02/21/2024 09:12  150 - 450 x10*3/uL Final     MPV   Date/Time Value Ref Range Status   10/19/2023 10:08 AM 9.8 7.5 - 11.5 fL Final     Neutrophils %   Date/Time Value Ref Range Status   05/17/2024 10:27 AM 57.2 40.0 - 80.0 % Final   04/22/2024 11:35 AM 64.1 40.0 - 80.0 % Final   02/21/2024 09:12 AM 54.6 40.0 - 80.0 % Final     Immature Granulocytes %, Automated   Date/Time Value Ref Range Status   05/17/2024 10:27  AM 0.6 0.0 - 0.9 % Final     Comment:     Immature Granulocyte Count (IG) includes promyelocytes, myelocytes and metamyelocytes but does not include bands. Percent differential counts (%) should be interpreted in the context of the absolute cell counts (cells/UL).   04/22/2024 11:35 AM 0.2 0.0 - 0.9 % Final     Comment:     Immature Granulocyte Count (IG) includes promyelocytes, myelocytes and metamyelocytes but does not include bands. Percent differential counts (%) should be interpreted in the context of the absolute cell counts (cells/UL).   02/21/2024 09:12 AM 0.6 0.0 - 0.9 % Final     Comment:     Immature Granulocyte Count (IG) includes promyelocytes, myelocytes and metamyelocytes but does not include bands. Percent differential counts (%) should be interpreted in the context of the absolute cell counts (cells/UL).     Lymphocytes %   Date/Time Value Ref Range Status   05/17/2024 10:27 AM 27.8 13.0 - 44.0 % Final   04/22/2024 11:35 AM 24.1 13.0 - 44.0 % Final   02/21/2024 09:12 AM 28.3 13.0 - 44.0 % Final     Monocytes %   Date/Time Value Ref Range Status   05/17/2024 10:27 AM 9.3 2.0 - 10.0 % Final   04/22/2024 11:35 AM 8.1 2.0 - 10.0 % Final   02/21/2024 09:12 AM 9.2 2.0 - 10.0 % Final     Eosinophils %   Date/Time Value Ref Range Status   05/17/2024 10:27 AM 4.5 0.0 - 6.0 % Final   04/22/2024 11:35 AM 3.1 0.0 - 6.0 % Final   02/21/2024 09:12 AM 6.7 0.0 - 6.0 % Final     Basophils %   Date/Time Value Ref Range Status   05/17/2024 10:27 AM 0.6 0.0 - 2.0 % Final   04/22/2024 11:35 AM 0.4 0.0 - 2.0 % Final   02/21/2024 09:12 AM 0.6 0.0 - 2.0 % Final     Neutrophils Absolute   Date/Time Value Ref Range Status   05/17/2024 10:27 AM 1.92 1.60 - 5.50 x10*3/uL Final     Comment:     Percent differential counts (%) should be interpreted in the context of the absolute cell counts (cells/uL).   04/22/2024 11:35 AM 2.93 1.60 - 5.50 x10*3/uL Final     Comment:     Percent differential counts (%) should be interpreted in  "the context of the absolute cell counts (cells/uL).   02/21/2024 09:12 AM 1.95 1.60 - 5.50 x10*3/uL Final     Comment:     Percent differential counts (%) should be interpreted in the context of the absolute cell counts (cells/uL).     Immature Granulocytes Absolute, Automated   Date/Time Value Ref Range Status   05/17/2024 10:27 AM 0.02 0.00 - 0.50 x10*3/uL Final   04/22/2024 11:35 AM 0.01 0.00 - 0.50 x10*3/uL Final   02/21/2024 09:12 AM 0.02 0.00 - 0.50 x10*3/uL Final     Lymphocytes Absolute   Date/Time Value Ref Range Status   05/17/2024 10:27 AM 0.93 0.80 - 3.00 x10*3/uL Final   04/22/2024 11:35 AM 1.10 0.80 - 3.00 x10*3/uL Final   02/21/2024 09:12 AM 1.01 0.80 - 3.00 x10*3/uL Final     Monocytes Absolute   Date/Time Value Ref Range Status   05/17/2024 10:27 AM 0.31 0.05 - 0.80 x10*3/uL Final   04/22/2024 11:35 AM 0.37 0.05 - 0.80 x10*3/uL Final   02/21/2024 09:12 AM 0.33 0.05 - 0.80 x10*3/uL Final     Eosinophils Absolute   Date/Time Value Ref Range Status   05/17/2024 10:27 AM 0.15 0.00 - 0.40 x10*3/uL Final   04/22/2024 11:35 AM 0.14 0.00 - 0.40 x10*3/uL Final   02/21/2024 09:12 AM 0.24 0.00 - 0.40 x10*3/uL Final     Basophils Absolute   Date/Time Value Ref Range Status   05/17/2024 10:27 AM 0.02 0.00 - 0.10 x10*3/uL Final   04/22/2024 11:35 AM 0.02 0.00 - 0.10 x10*3/uL Final   02/21/2024 09:12 AM 0.02 0.00 - 0.10 x10*3/uL Final       No components found for: \"PT\"  aPTT   Date/Time Value Ref Range Status   05/19/2022 10:56 AM 31 26 - 39 sec Final     Comment:       THE APTT IS NO LONGER USED FOR MONITORING     UNFRACTIONATED HEPARIN THERAPY.    FOR MONITORING HEPARIN THERAPY,     USE THE HEPARIN ASSAY.       Medication Documentation Review Audit       Reviewed by Vee Livingston MA (Medical Assistant) on 05/21/24 at 0929      Medication Order Taking? Sig Documenting Provider Last Dose Status   dupilumab (Dupixent Pen) 300 mg/2 mL injection [id number] Yes Inject under the skin 1 time. A month Historical " Provider, MD Taking Active   enzalutamide (Xtandi) 40 mg tablet [id number] Yes Take 4 tablets (160 mg total) by mouth once daily.  Take with or without food at the same time each day. Do not crush, break, or dissolve. Swallow it whole. Gabe Garza MD Taking Active   enzalutamide (Xtandi) 40 mg tablet [id number] Yes Take 4 tablets (160 mg total) by mouth once daily.  Take with or without food at the same time each day. Do not crush, break, or dissolve. Swallow it whole. Gabe Garza MD Taking Active   leuprolide, 6-month, (Lupron Depot, 6 Month,) 45 mg injection [id number] Yes Inject into the muscle every 6 months. Historical Provider, MD Taking Active   Xtandi 40 mg capsule [id number] No Take 4 capsules (160 mg total) by mouth once daily.   Patient not taking: Reported on 5/21/2024    Gabe Garza MD Not Taking Active                   Assessment/Plan    1) prostate cancer  -here for interval followup  -continues to take Xtandi 160 mg, dailly  -Labs done on 5/17/2024 included CBC, COMP, PSA  -results reviewed: WBC 3.4, Hgb 13.5, platelets 210,000, PSA pending (had to be added on), creatinine 0.96, AST 14, ALT 8  -last lupron 6 month depot injection received 11/30/23; pt states he wants to receive lupron in our office now--next lupron due today  -benefits, risks,  potential morbidity related to lupron were reviewed with Jose and he provided informed consent to proceed  -he will receive lupron 45 mg intramuscular  -PSMA PET done on 5/10/2024 reviewed-no abnormal focal Ga68 PSMA uptake in neck and chest; no abnormal Ga68 PSMA uptake within abdominal lymph nodes; mild heterogeneous Ga68 uptake seen in prostate gland with SUV of 1.9; no abnormal Ga68 PSMA uptake is seen within periprostatic and pelvic lymph nodes; there is markedly intense gallium 68 PSMA uptake in L5-- previously noted foci on 2021 bone scan in bilateral acetabulae, right ischium and sacrum are not evidence of current PET  -as he has such  limited new disease, I don't think we need to radically alter his treatment (such as switch to chemo with docetaxel)  -Advised patient to continue Xtandi without change for now  -will refer to radiation oncology to consider treatment of L5 metastasis  -will see him again in 6 weeks     2) eczema  -on dupixent     3) left leg DVT  -on eliquis  -as he now has been confirmed to have prostate cancer, the cancer was most likely the provoking factor the DVT, as 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     4) bone metastases  -secondary to prostate cancer  -as androgen deprivation therapy is expected to diminish bone density over time, will consider addition of either bisphosphonate (zometa) or RANKL inhibitor (xgeva) to help build back bone density and to minimize risk for pathologic fracture  -he recently fell and broke his right elbow --> injured/twisted his left hip in the process  -DEXA was booked for 1/10/2024 but he cancelled appointment because he caught Covid  -DEXA done on 3/8/2024 consistent with osteoporosis     Problem List Items Addressed This Visit             ICD-10-CM    Neoplasm of prostate, distant metastasis staging category M1b: metastasis to bone (Multi) C61, C79.51    Prostate cancer metastatic to bone (Multi) C61, C79.51    Relevant Orders    CBC and Auto Differential (Completed)    Comprehensive Metabolic Panel (Completed)            Gabe Garza MD

## 2024-05-22 LAB — PSA SERPL-MCNC: 7.9 NG/ML

## 2024-05-24 ENCOUNTER — APPOINTMENT (OUTPATIENT)
Dept: HEMATOLOGY/ONCOLOGY | Facility: CLINIC | Age: 74
End: 2024-05-24
Payer: MEDICARE

## 2024-05-31 ENCOUNTER — TELEPHONE (OUTPATIENT)
Dept: RADIATION ONCOLOGY | Facility: CLINIC | Age: 74
End: 2024-05-31

## 2024-05-31 ENCOUNTER — APPOINTMENT (OUTPATIENT)
Dept: HEMATOLOGY/ONCOLOGY | Facility: CLINIC | Age: 74
End: 2024-05-31
Payer: MEDICARE

## 2024-05-31 ENCOUNTER — HOSPITAL ENCOUNTER (OUTPATIENT)
Dept: RADIATION ONCOLOGY | Facility: CLINIC | Age: 74
Setting detail: RADIATION/ONCOLOGY SERIES
Discharge: HOME | End: 2024-05-31
Payer: MEDICARE

## 2024-05-31 VITALS
RESPIRATION RATE: 20 BRPM | OXYGEN SATURATION: 97 % | HEART RATE: 74 BPM | BODY MASS INDEX: 29.53 KG/M2 | DIASTOLIC BLOOD PRESSURE: 90 MMHG | TEMPERATURE: 97.7 F | WEIGHT: 185.74 LBS | SYSTOLIC BLOOD PRESSURE: 131 MMHG

## 2024-05-31 DIAGNOSIS — C61 NEOPLASM OF PROSTATE, DISTANT METASTASIS STAGING CATEGORY M1B: METASTASIS TO BONE (MULTI): ICD-10-CM

## 2024-05-31 DIAGNOSIS — C79.51 PROSTATE CANCER METASTATIC TO BONE (MULTI): Primary | ICD-10-CM

## 2024-05-31 DIAGNOSIS — C61 PROSTATE CANCER METASTATIC TO BONE (MULTI): Primary | ICD-10-CM

## 2024-05-31 DIAGNOSIS — C79.51 NEOPLASM OF PROSTATE, DISTANT METASTASIS STAGING CATEGORY M1B: METASTASIS TO BONE (MULTI): ICD-10-CM

## 2024-05-31 PROCEDURE — 99205 OFFICE O/P NEW HI 60 MIN: CPT | Performed by: RADIOLOGY

## 2024-05-31 PROCEDURE — 99215 OFFICE O/P EST HI 40 MIN: CPT | Performed by: RADIOLOGY

## 2024-05-31 ASSESSMENT — ENCOUNTER SYMPTOMS
CARDIOVASCULAR NEGATIVE: 1
HEMATOLOGIC/LYMPHATIC NEGATIVE: 1
GASTROINTESTINAL NEGATIVE: 1
MUSCULOSKELETAL NEGATIVE: 1
EYES NEGATIVE: 1
PSYCHIATRIC NEGATIVE: 1
SHORTNESS OF BREATH: 1
NEUROLOGICAL NEGATIVE: 1
CONSTITUTIONAL NEGATIVE: 1
ENDOCRINE NEGATIVE: 1

## 2024-05-31 ASSESSMENT — PAIN SCALES - GENERAL: PAINLEVEL: 0-NO PAIN

## 2024-05-31 NOTE — PROGRESS NOTES
Radiation Oncology Outpatient Consult    Patient Name:  Jose George  MRN:  [id number]  :  1950    Referring Provider: Gabe Garza MD  Primary Care Provider: Jatin Soriano DO  Care Team: Patient Care Team:  Jatin Soriano DO as PCP - General (Family Medicine)  Jatin Soriano DO as PCP - [employer] Advantage PCP  Gabe Garza MD as Consulting Physician (Hematology and Oncology)    Date of Service: 2024       SUMMARY: 73 y.o. male with metastatic prostate cancer on Lupron and Xtandi presenting with oligo-progression in L5 lesion dispo for SBRT    SUBJECTIVE  History of Present Illness:  Jose George is a 73 y.o. male who was referred by Gabe Garza MD, for a consultation to the [facility] Department of Radiation Oncology.  He is presenting for evaluation and management of cancer    Oncologic History as follows:    Pt is being seen by Dr Garza for metastatic prostate cancer. He is on lupron and Xtandi. Had a PET/CT on 5/10/2024, which demonstrated markedly intense gallium 68 PSMA uptake in L5-- previously noted foci on  bone scan in bilateral acetabulae, right ischium and sacrum are not evidence of current PET. Pt referred by Dr Garza for radiation of oligoprogressive prostate cancer       Today, the patient reports feeling well overall. The patient denies any pain or neurologic symptoms     Prior Radiotherapy:  No  No radiation treatments to show. (Treatments may have been administered in another system.)      Current Systemic Treatment:  No On Lupron and Xtandi     Presence of Pacemaker or ICD:  No     History of Autoimmune or Connective Tissue Disorders:  No     Pain: The patient's current pain level was assessed.  They report currently having a pain of 0 out of 10.  They feel their pain is under control without the use of pain medications.     Review of Systems:  Review of Systems   Constitutional: Negative.    HENT:  Negative.      Eyes: Negative.    Respiratory:  Positive for shortness of breath (Dyspnea on exertion).    Cardiovascular: Negative.    Gastrointestinal: Negative.    Endocrine: Negative.    Genitourinary:  Positive for nocturia (x3).    Musculoskeletal: Negative.    Skin: Negative.    Neurological: Negative.    Hematological: Negative.    Psychiatric/Behavioral: Negative.         Past Surgical History:    Past Surgical History:   Procedure Laterality Date    OTHER SURGICAL HISTORY  12/08/2020    Craniotomy    OTHER SURGICAL HISTORY  12/08/2020    Hernia repair        Family History:  Cancer-related family history is not on file.    Social History:    Social History     Tobacco Use    Smoking status: Never    Smokeless tobacco: Never   Substance Use Topics    Alcohol use: Not Currently    Drug use: Not Currently       Allergies:    Allergies   Allergen Reactions    Pollen Extracts Headache     Sinus headache, itchy watery eyes        Medications:    Current Outpatient Medications:     dupilumab (Dupixent Pen) 300 mg/2 mL injection, Inject under the skin 1 time. A month, Disp: , Rfl:     enzalutamide (Xtandi) 40 mg tablet, Take 4 tablets (160 mg total) by mouth once daily.  Take with or without food at the same time each day. Do not crush, break, or dissolve. Swallow it whole., Disp: 120 tablet, Rfl: 5    enzalutamide (Xtandi) 40 mg tablet, Take 4 tablets (160 mg total) by mouth once daily.  Take with or without food at the same time each day. Do not crush, break, or dissolve. Swallow it whole., Disp: 120 tablet, Rfl: 5    leuprolide, 6-month, (Lupron Depot, 6 Month,) 45 mg injection, Inject into the muscle every 6 months., Disp: , Rfl:     Xtandi 40 mg capsule, Take 4 capsules (160 mg total) by mouth once daily. (Patient not taking: Reported on 5/21/2024), Disp: 120 capsule, Rfl: 3          Performance Status:  ECOG 1    OBJECTIVE  Physical Exam:  There were no vitals taken for this visit.   Constitutional: Awake, alert and  oriented. No acute distress. Appears stated age.  Eyes: Conjunctivae are clear without exudates or hemorrhage. Eyelids are normal in appearance without swelling or lesions.  ENMT: mucous membranes moist, no apparent injury, no lesions seen  Neck: Neck supple, no apparent injury, trachea midline  Resp/Thorax: Patent airways,  good chest expansion. Thorax symmetric  Cardiovascular: The external chest is normal without lifts, heaves, or thrills. PMI is not visible.  GI: Nondistended, soft, non-tender.  /Gyn: Deferred  MSK: No tenderness noted on palpation of the spine. No ROM limitations.  Extremities: normal extremities, no cyanosis, erythema or edema.  Neurological: alert and oriented x3. Sensory and motor function appear intact. No gait abnormality observed.  Psych: Appropriate mood and behavior.  Skin: Warm and dry, no new lesions or rashes.        Laboratory Review:    Lab Results   Component Value Date    WBC 3.4 (L) 05/17/2024    HGB 13.5 05/17/2024    HCT 40.1 (L) 05/17/2024     05/17/2024    CHOL 147 02/01/2024    TRIG 90 02/01/2024    HDL 54.6 02/01/2024    ALT 8 (L) 05/17/2024    AST 14 05/17/2024     05/17/2024    K 4.1 05/17/2024     05/17/2024    CREATININE 0.96 05/17/2024    BUN 17 05/17/2024    CO2 28 05/17/2024    TSH 4.63 (H) 02/06/2024    PSA 7.90 (H) 05/17/2024    INR 1.0 05/19/2022    HGBA1C 5.2 02/01/2024     The pertinent lab results were reviewed and discussed with the patient.      Pathology Review:  No new path    Imaging:  The pertinent imaging results were reviewed and discussed with the patient.  Notably,   Mild heterogeneous Ga68 PSMA uptake seen in the prostate gland.  Finding is nonspecific.      Focus of markedly intense killing 68 PSMA uptake in the region of L5.  Finding would be consistent an osseous metastatic focus.      It should be noted that the previously noted foci (on prior bone  scan) in the bilateral acetabulae, right ischium and sacrum are not  readily  evident on PSMA PET-CT.       ASSESSMENT:  Jose George is a 73 y.o. male with No matching staging information was found for the patient..       COUNSELING AND COORDINATION OF CARE:        The natural history of osseous metastatic disease to the lumbar spine was reviewed with the patient. Prognostic factors including age, performance status and primary site were reviewed. Standard treatment modalities such as conventional fractionation radiotherapy, SBRT vs no radiation/continuation of systetmic therapy were compared and contrasted with regard to outcome and quality of life measures. The indications, benefits, side effects and complications of radiotherapy, which include skin changes, such redness, irritation, scaliness, ulceration, change in color, thickening, hair loss, changes in skin texture and/or coloration; N/V, diarrhea, appetite loss, weight loss, stomach ulcers. Late, fatigue, skin discoloration, liver/renal dysfunction, bowel obstruction, stomach/bowel ulcers, dry/hyperpigmented skin have all been discussed in detail with the patient. All questions were answered to the patient´s satisfaction.     PLAN:    Radiation Intent to treat orders have been placed in Epic  Will treat with SBRT, 24Gy in 2 fractions   MRI L Spine for treatment planning    NCCN Guidelines were applicable to guide this patients treatment plan.   Patrick Smith MD       Future Appointments   Date Time Provider Department [place]   5/31/2024  1:00 PM Patrick Smith MD [id number] [place]   7/2/2024 11:00 AM Gabe Garza MD [id number] [place]   11/5/2024 11:00 AM INF 8A [id number] [place]

## 2024-05-31 NOTE — PROGRESS NOTES
Radiation Oncology Nursing Note    Prior Radiotherapy:  No  No radiation treatments to show. (Treatments may have been administered in another system.)     Current Systemic Treatment:  No On Lupron and Xtandi     Presence of Pacemaker or ICD:  No    History of Autoimmune or Connective Tissue Disorders:  No    Pain: The patient's current pain level was assessed.  They report currently having a pain of 0 out of 10.  They feel their pain is under control without the use of pain medications.    Review of Systems:  Review of Systems   Constitutional: Negative.    HENT:  Negative.     Eyes: Negative.    Respiratory:  Positive for shortness of breath (Dyspnea on exertion).    Cardiovascular: Negative.    Gastrointestinal: Negative.    Endocrine: Negative.    Genitourinary:  Positive for nocturia (x3).    Musculoskeletal: Negative.    Skin: Negative.    Neurological: Negative.    Hematological: Negative.    Psychiatric/Behavioral: Negative.

## 2024-05-31 NOTE — TELEPHONE ENCOUNTER
MRI 6/12; SIM 6/17 (he wanted that Monday).    He asked me if there was public transporation to [place].  He said he can get to [place] on a bus and even today he used the bus from [place] and back that's why he didn't answer my calls.  I told him [employer] could give him some transportation options and he said he'll explore that if he needs.  I did not mention him being treated at [place] or anything but he said he can get transportation there when we were discussing the MRI locations.  Just an FYI.

## 2024-05-31 NOTE — TELEPHONE ENCOUNTER
Left pt VM to schedule and MRI and Sim per Dr. Smith.  Dr. Smith said okay to schedule MRI per insurance and Sim after MRI no need for stat.

## 2024-06-03 ENCOUNTER — APPOINTMENT (OUTPATIENT)
Dept: RADIATION ONCOLOGY | Facility: CLINIC | Age: 74
End: 2024-06-03
Payer: MEDICARE

## 2024-06-03 ENCOUNTER — SOCIAL WORK (OUTPATIENT)
Dept: CASE MANAGEMENT | Facility: HOSPITAL | Age: 74
End: 2024-06-03
Payer: MEDICARE

## 2024-06-03 NOTE — PROGRESS NOTES
Social Work Note  6/3/2024 SW contacted patient (wife was in the background) as radiation adm assistant sent a secure chat to this writer regarding potential difficulties with patient and transportation.  [place]  His MRI was changed to stay in [facility].  CT Sim is in [place] on 6/17.  Wife only gets her schedule a week at a time. SW encouraged her to check to see if her schedule can be accommodated to transport patient.  He will not have transportation for daily radiation is wife can not provide this.  ESTEE did send out an email to radiation therapists and MD to alert them of potential difficulties with patient getting here.  Patient asked that this writer send an email with contact information.  This writer did do this and also explained other areas social work can assist with.  SW will remain available to assist patient.  Karolina Jane, MSW, LSW

## 2024-06-12 ENCOUNTER — HOSPITAL ENCOUNTER (OUTPATIENT)
Dept: RADIOLOGY | Facility: CLINIC | Age: 74
Discharge: HOME | End: 2024-06-12
Payer: MEDICARE

## 2024-06-12 DIAGNOSIS — C61 PROSTATE CANCER METASTATIC TO BONE (MULTI): ICD-10-CM

## 2024-06-12 DIAGNOSIS — C79.51 PROSTATE CANCER METASTATIC TO BONE (MULTI): ICD-10-CM

## 2024-06-12 PROCEDURE — 72158 MRI LUMBAR SPINE W/O & W/DYE: CPT

## 2024-06-12 PROCEDURE — A9575 INJ GADOTERATE MEGLUMI 0.1ML: HCPCS | Performed by: RADIOLOGY

## 2024-06-12 PROCEDURE — 72158 MRI LUMBAR SPINE W/O & W/DYE: CPT | Performed by: RADIOLOGY

## 2024-06-12 PROCEDURE — 2500000004 HC RX 250 GENERAL PHARMACY W/ HCPCS (ALT 636 FOR OP/ED): Performed by: RADIOLOGY

## 2024-06-12 RX ORDER — GADOTERATE MEGLUMINE 376.9 MG/ML
0.2 INJECTION INTRAVENOUS
Status: COMPLETED | OUTPATIENT
Start: 2024-06-12 | End: 2024-06-12

## 2024-06-13 ENCOUNTER — TELEPHONE (OUTPATIENT)
Dept: HEMATOLOGY/ONCOLOGY | Facility: CLINIC | Age: 74
End: 2024-06-13
Payer: MEDICARE

## 2024-06-13 ENCOUNTER — TELEPHONE (OUTPATIENT)
Dept: RADIATION ONCOLOGY | Facility: CLINIC | Age: 74
End: 2024-06-13

## 2024-06-13 NOTE — TELEPHONE ENCOUNTER
Spoke with the patient. Explained to the patient that Dr. Garza did not order a CT. Pt did have MRI (Dr. Smith ordered) yesterday and previously had PET scan. Pt unsure what the message was about. We reviewed upcoming FUV, lab work needed 1-2 days before FUV and patient had no further questions or concerns at this time.

## 2024-06-13 NOTE — TELEPHONE ENCOUNTER
Insurance isn't covering his CT that was scheduled for Monday and they cancelled the appointment.  Asking what next steps are?

## 2024-06-14 NOTE — TELEPHONE ENCOUNTER
Spoke with the patient. States he called Dr. Smith's office after talking with us yesterday and noted that Dr. Smith had cancelled a CT for this upcoming Monday. Explained to patient that his FUV/plan has not changed at this time and we reviewed his upcoming appt. Pt had no further questions or concerns at this time.

## 2024-06-17 ENCOUNTER — HOSPITAL ENCOUNTER (OUTPATIENT)
Dept: RADIATION ONCOLOGY | Facility: CLINIC | Age: 74
Setting detail: RADIATION/ONCOLOGY SERIES
Discharge: HOME | End: 2024-06-17
Payer: MEDICARE

## 2024-06-17 ENCOUNTER — APPOINTMENT (OUTPATIENT)
Dept: RADIATION ONCOLOGY | Facility: CLINIC | Age: 74
End: 2024-06-17
Payer: MEDICARE

## 2024-06-17 ENCOUNTER — HOSPITAL ENCOUNTER (OUTPATIENT)
Dept: RADIOLOGY | Facility: EXTERNAL LOCATION | Age: 74
Discharge: HOME | End: 2024-06-17
Payer: MEDICARE

## 2024-06-17 ENCOUNTER — TRANSCRIBE ORDERS (OUTPATIENT)
Dept: RADIATION ONCOLOGY | Facility: HOSPITAL | Age: 74
End: 2024-06-17
Payer: MEDICARE

## 2024-06-17 DIAGNOSIS — C79.52 SECONDARY MALIGNANT NEOPLASM OF BONE AND BONE MARROW (MULTI): ICD-10-CM

## 2024-06-17 DIAGNOSIS — C79.51 SECONDARY MALIGNANT NEOPLASM OF BONE AND BONE MARROW (MULTI): ICD-10-CM

## 2024-06-26 ENCOUNTER — HOSPITAL ENCOUNTER (OUTPATIENT)
Dept: RADIATION ONCOLOGY | Facility: CLINIC | Age: 74
Setting detail: RADIATION/ONCOLOGY SERIES
Discharge: HOME | End: 2024-06-26
Payer: MEDICARE

## 2024-06-26 PROCEDURE — 77334 RADIATION TREATMENT AID(S): CPT | Performed by: RADIOLOGY

## 2024-06-26 PROCEDURE — 77295 3-D RADIOTHERAPY PLAN: CPT | Performed by: RADIOLOGY

## 2024-06-26 PROCEDURE — 77300 RADIATION THERAPY DOSE PLAN: CPT | Performed by: RADIOLOGY

## 2024-06-27 ENCOUNTER — TELEPHONE (OUTPATIENT)
Dept: HEMATOLOGY/ONCOLOGY | Facility: CLINIC | Age: 74
End: 2024-06-27
Payer: MEDICARE

## 2024-06-27 NOTE — TELEPHONE ENCOUNTER
Spoke with the patient. Reviewed upcoming appts and patient verbalized understanding. Also reviewed last office visit note from Dr. Garza and patient had no further questions or concerns at this time.

## 2024-06-28 ENCOUNTER — LAB (OUTPATIENT)
Dept: LAB | Facility: CLINIC | Age: 74
End: 2024-06-28
Payer: MEDICARE

## 2024-06-28 DIAGNOSIS — C61 NEOPLASM OF PROSTATE, DISTANT METASTASIS STAGING CATEGORY M1B: METASTASIS TO BONE (MULTI): ICD-10-CM

## 2024-06-28 DIAGNOSIS — C61 PROSTATE CANCER METASTATIC TO BONE (MULTI): ICD-10-CM

## 2024-06-28 DIAGNOSIS — C79.51 NEOPLASM OF PROSTATE, DISTANT METASTASIS STAGING CATEGORY M1B: METASTASIS TO BONE (MULTI): ICD-10-CM

## 2024-06-28 DIAGNOSIS — C79.51 PROSTATE CANCER METASTATIC TO BONE (MULTI): ICD-10-CM

## 2024-06-28 LAB
ALBUMIN SERPL BCP-MCNC: 4 G/DL (ref 3.4–5)
ALP SERPL-CCNC: 77 U/L (ref 33–136)
ALT SERPL W P-5'-P-CCNC: 8 U/L (ref 10–52)
ANION GAP SERPL CALC-SCNC: 11 MMOL/L (ref 10–20)
AST SERPL W P-5'-P-CCNC: 12 U/L (ref 9–39)
BASOPHILS # BLD AUTO: 0.03 X10*3/UL (ref 0–0.1)
BASOPHILS NFR BLD AUTO: 0.6 %
BILIRUB SERPL-MCNC: 0.5 MG/DL (ref 0–1.2)
BUN SERPL-MCNC: 20 MG/DL (ref 6–23)
CALCIUM SERPL-MCNC: 9.6 MG/DL (ref 8.6–10.3)
CHLORIDE SERPL-SCNC: 105 MMOL/L (ref 98–107)
CO2 SERPL-SCNC: 29 MMOL/L (ref 21–32)
CREAT SERPL-MCNC: 1.03 MG/DL (ref 0.5–1.3)
EGFRCR SERPLBLD CKD-EPI 2021: 77 ML/MIN/1.73M*2
EOSINOPHIL # BLD AUTO: 0.23 X10*3/UL (ref 0–0.4)
EOSINOPHIL NFR BLD AUTO: 4.9 %
ERYTHROCYTE [DISTWIDTH] IN BLOOD BY AUTOMATED COUNT: 13.5 % (ref 11.5–14.5)
GLUCOSE SERPL-MCNC: 88 MG/DL (ref 74–99)
HCT VFR BLD AUTO: 37.9 % (ref 41–52)
HGB BLD-MCNC: 13.2 G/DL (ref 13.5–17.5)
IMM GRANULOCYTES # BLD AUTO: 0.02 X10*3/UL (ref 0–0.5)
IMM GRANULOCYTES NFR BLD AUTO: 0.4 % (ref 0–0.9)
LYMPHOCYTES # BLD AUTO: 1.04 X10*3/UL (ref 0.8–3)
LYMPHOCYTES NFR BLD AUTO: 22.1 %
MCH RBC QN AUTO: 31 PG (ref 26–34)
MCHC RBC AUTO-ENTMCNC: 34.8 G/DL (ref 32–36)
MCV RBC AUTO: 89 FL (ref 80–100)
MONOCYTES # BLD AUTO: 0.34 X10*3/UL (ref 0.05–0.8)
MONOCYTES NFR BLD AUTO: 7.2 %
NEUTROPHILS # BLD AUTO: 3.05 X10*3/UL (ref 1.6–5.5)
NEUTROPHILS NFR BLD AUTO: 64.8 %
PLATELET # BLD AUTO: 249 X10*3/UL (ref 150–450)
POTASSIUM SERPL-SCNC: 4 MMOL/L (ref 3.5–5.3)
PROT SERPL-MCNC: 6.4 G/DL (ref 6.4–8.2)
PSA SERPL-MCNC: 11.38 NG/ML
RBC # BLD AUTO: 4.26 X10*6/UL (ref 4.5–5.9)
SODIUM SERPL-SCNC: 141 MMOL/L (ref 136–145)
WBC # BLD AUTO: 4.7 X10*3/UL (ref 4.4–11.3)

## 2024-06-28 PROCEDURE — 36415 COLL VENOUS BLD VENIPUNCTURE: CPT

## 2024-06-28 PROCEDURE — 84153 ASSAY OF PSA TOTAL: CPT

## 2024-06-28 PROCEDURE — 85025 COMPLETE CBC W/AUTO DIFF WBC: CPT

## 2024-06-28 PROCEDURE — 80053 COMPREHEN METABOLIC PANEL: CPT

## 2024-07-01 ENCOUNTER — APPOINTMENT (OUTPATIENT)
Dept: RADIATION ONCOLOGY | Facility: CLINIC | Age: 74
End: 2024-07-01
Payer: MEDICARE

## 2024-07-02 ENCOUNTER — OFFICE VISIT (OUTPATIENT)
Dept: HEMATOLOGY/ONCOLOGY | Facility: CLINIC | Age: 74
End: 2024-07-02
Payer: MEDICARE

## 2024-07-02 VITALS
DIASTOLIC BLOOD PRESSURE: 79 MMHG | BODY MASS INDEX: 29.27 KG/M2 | OXYGEN SATURATION: 98 % | HEART RATE: 63 BPM | RESPIRATION RATE: 18 BRPM | TEMPERATURE: 97.3 F | WEIGHT: 184.08 LBS | SYSTOLIC BLOOD PRESSURE: 123 MMHG

## 2024-07-02 DIAGNOSIS — L30.9 ECZEMA, UNSPECIFIED TYPE: Primary | ICD-10-CM

## 2024-07-02 DIAGNOSIS — C61 PROSTATE CANCER METASTATIC TO BONE (MULTI): ICD-10-CM

## 2024-07-02 DIAGNOSIS — C61 NEOPLASM OF PROSTATE, DISTANT METASTASIS STAGING CATEGORY M1B: METASTASIS TO BONE (MULTI): ICD-10-CM

## 2024-07-02 DIAGNOSIS — I82.532 CHRONIC DEEP VEIN THROMBOSIS (DVT) OF POPLITEAL VEIN OF LEFT LOWER EXTREMITY (MULTI): ICD-10-CM

## 2024-07-02 DIAGNOSIS — C79.51 NEOPLASM OF PROSTATE, DISTANT METASTASIS STAGING CATEGORY M1B: METASTASIS TO BONE (MULTI): ICD-10-CM

## 2024-07-02 DIAGNOSIS — C79.51 PROSTATE CANCER METASTATIC TO BONE (MULTI): ICD-10-CM

## 2024-07-02 PROCEDURE — 99214 OFFICE O/P EST MOD 30 MIN: CPT | Performed by: INTERNAL MEDICINE

## 2024-07-02 ASSESSMENT — PAIN SCALES - GENERAL: PAINLEVEL: 1

## 2024-07-03 ENCOUNTER — RADIATION ONCOLOGY OTV (OUTPATIENT)
Dept: RADIATION ONCOLOGY | Facility: CLINIC | Age: 74
End: 2024-07-03
Payer: MEDICARE

## 2024-07-03 ENCOUNTER — HOSPITAL ENCOUNTER (OUTPATIENT)
Dept: RADIATION ONCOLOGY | Facility: CLINIC | Age: 74
Setting detail: RADIATION/ONCOLOGY SERIES
Discharge: HOME | End: 2024-07-03
Payer: MEDICARE

## 2024-07-03 VITALS
RESPIRATION RATE: 18 BRPM | TEMPERATURE: 96.6 F | OXYGEN SATURATION: 97 % | DIASTOLIC BLOOD PRESSURE: 87 MMHG | SYSTOLIC BLOOD PRESSURE: 129 MMHG | BODY MASS INDEX: 29.23 KG/M2 | WEIGHT: 183.86 LBS | HEART RATE: 56 BPM

## 2024-07-03 DIAGNOSIS — C79.51 SECONDARY MALIGNANT NEOPLASM OF BONE (MULTI): ICD-10-CM

## 2024-07-03 LAB
RAD ONC MSQ ACTUAL FRACTIONS DELIVERED: 1
RAD ONC MSQ ACTUAL SESSION DELIVERED DOSE: 1200 CGRAY
RAD ONC MSQ ACTUAL TOTAL DOSE: 1200 CGRAY
RAD ONC MSQ ELAPSED DAYS: 0
RAD ONC MSQ LAST DATE: NORMAL
RAD ONC MSQ PRESCRIBED FRACTIONAL DOSE: 1200 CGRAY
RAD ONC MSQ PRESCRIBED NUMBER OF FRACTIONS: 2
RAD ONC MSQ PRESCRIBED TECHNIQUE: NORMAL
RAD ONC MSQ PRESCRIBED TOTAL DOSE: 2400 CGRAY
RAD ONC MSQ START DATE: NORMAL
RAD ONC MSQ TREATMENT COURSE NUMBER: 1
RAD ONC MSQ TREATMENT SITE: NORMAL

## 2024-07-03 PROCEDURE — 77373 STRTCTC BDY RAD THER TX DLVR: CPT | Performed by: RADIOLOGY

## 2024-07-03 ASSESSMENT — PAIN SCALES - GENERAL: PAINLEVEL: 0-NO PAIN

## 2024-07-03 NOTE — PROGRESS NOTES
Radiation Oncology On Treatment Visit    Patient Name:  Jose George  MRN:  [id number]  :  1950    Referring Provider: No ref. provider found  Primary Care Provider: Jatin Soriano DO  Care Team: Patient Care Team:  Jatin Soriano DO as PCP - General (Family Medicine)  Jatin Soriano DO as PCP - O Medicare Advantage PCP  Gabe Hill MD as Consulting Physician (Hematology and Oncology)    Date of Service: 7/3/2024     Diagnosis:   Specialty Problems          Radiation Oncology Problems    Neoplasm of prostate, distant metastasis staging category M1b: metastasis to bone (Multi)        Prostate cancer metastatic to bone (Multi)         Treatment Summary:  SBRT: Lumbar spine    Treatment Period Technique Fraction Dose Fractions Total Dose   Course 1 7/3/2024-7/3/2024  (days elapsed: 0)         L5 7/3/2024-7/3/2024 SBRT 1200 / 1,200 cGy 1 / 2 1200 / 2,400 cGy     SUBJECTIVE: Patient is here for OTV.  Denies low back pain, denies nausea.      OBJECTIVE:   Vital Signs:  /87 (BP Location: Right arm, Patient Position: Sitting, BP Cuff Size: Adult)   Pulse 56   Temp 35.9 °C (96.6 °F) (Temporal)   Resp 18   Wt 83.4 kg (183 lb 13.8 oz)   SpO2 97%   BMI 29.23 kg/m²    Pain Scale: The patient's current pain level was assessed.  They report currently having a pain of 0 out of 10.    Other Pertinent Findings:     Toxicity Assessment          7/3/2024    15:00   Toxicity Assessment   Adverse Events Reviewed (WDL) Yes (Within Defined Limits)   Treatment Site Bone   Anorexia Grade 1       Baseline x2 years   Diarrhea Grade 0   Fatigue Grade 0   Nausea Grade 0   Pain Grade 0   Bone Pain Grade 0        Assessment / Plan:  The patient is tolerating radiation therapy as anticipated.  Continue per current treatment plan.   FU with Dr hill    Future Appointments   Date Time Provider Department Center   2024  3:45 PM AVN [id number] [place]   2024 10:20 AM Gabe Hill MD [id number]  [place]   11/5/2024 11:00 AM INF 8A [id number] [place]

## 2024-07-05 ENCOUNTER — HOSPITAL ENCOUNTER (OUTPATIENT)
Dept: RADIATION ONCOLOGY | Facility: CLINIC | Age: 74
Setting detail: RADIATION/ONCOLOGY SERIES
Discharge: HOME | End: 2024-07-05
Payer: MEDICARE

## 2024-07-05 ENCOUNTER — DOCUMENTATION (OUTPATIENT)
Dept: RADIATION ONCOLOGY | Facility: CLINIC | Age: 74
End: 2024-07-05
Payer: MEDICARE

## 2024-07-05 DIAGNOSIS — C79.51 SECONDARY MALIGNANT NEOPLASM OF BONE (MULTI): ICD-10-CM

## 2024-07-05 LAB
RAD ONC MSQ ACTUAL FRACTIONS DELIVERED: 2
RAD ONC MSQ ACTUAL SESSION DELIVERED DOSE: 1200 CGRAY
RAD ONC MSQ ACTUAL TOTAL DOSE: 2400 CGRAY
RAD ONC MSQ ELAPSED DAYS: 2
RAD ONC MSQ LAST DATE: NORMAL
RAD ONC MSQ PRESCRIBED FRACTIONAL DOSE: 1200 CGRAY
RAD ONC MSQ PRESCRIBED NUMBER OF FRACTIONS: 2
RAD ONC MSQ PRESCRIBED TECHNIQUE: NORMAL
RAD ONC MSQ PRESCRIBED TOTAL DOSE: 2400 CGRAY
RAD ONC MSQ START DATE: NORMAL
RAD ONC MSQ TREATMENT COURSE NUMBER: 1
RAD ONC MSQ TREATMENT SITE: NORMAL

## 2024-07-05 PROCEDURE — 77373 STRTCTC BDY RAD THER TX DLVR: CPT | Performed by: RADIOLOGY

## 2024-07-06 ASSESSMENT — ENCOUNTER SYMPTOMS
RESPIRATORY NEGATIVE: 1
PSYCHIATRIC NEGATIVE: 1
HEMATOLOGIC/LYMPHATIC NEGATIVE: 1
MUSCULOSKELETAL NEGATIVE: 1
ENDOCRINE NEGATIVE: 1
CARDIOVASCULAR NEGATIVE: 1
EYES NEGATIVE: 1
CONSTITUTIONAL NEGATIVE: 1
GASTROINTESTINAL NEGATIVE: 1
NEUROLOGICAL NEGATIVE: 1

## 2024-07-06 NOTE — PROGRESS NOTES
Patient ID: Jose George is a 73 y.o. male.  Referring Physician: Gabe Garza MD  [address]  [place] [address]  Primary Care Provider: Jatin Soriano DO  Visit Type: Follow Up      Subjective    HPI How was my labwork?    Review of Systems   Constitutional: Negative.    HENT:  Negative.     Eyes: Negative.    Respiratory: Negative.     Cardiovascular: Negative.    Gastrointestinal: Negative.    Endocrine: Negative.    Genitourinary: Negative.     Musculoskeletal: Negative.    Skin: Negative.    Neurological: Negative.    Hematological: Negative.    Psychiatric/Behavioral: Negative.          Objective   BSA: 1.98 meters squared  /79 (BP Location: Right arm)   Pulse 63   Temp 36.3 °C (97.3 °F) (Temporal)   Resp 18   Wt 83.5 kg (184 lb 1.4 oz)   SpO2 98%   BMI 29.27 kg/m²      has a past medical history of Allergic contact dermatitis, unspecified cause, Contact with and (suspected) exposure to covid-19 (12/08/2020), Localized edema (03/05/2021), Nodular prostate without lower urinary tract symptoms (03/25/2021), Olecranon bursitis, left elbow (03/05/2021), Personal history of malignant neoplasm of prostate (05/06/2021), Personal history of other diseases of the circulatory system, Personal history of other diseases of the musculoskeletal system and connective tissue, Personal history of other diseases of urinary system (04/26/2021), Personal history of other specified conditions (03/25/2021), and Sprain of unspecified ligament of left ankle, initial encounter (02/23/2021).   has a past surgical history that includes Other surgical history (12/08/2020) and Other surgical history (12/08/2020).  Family History   Problem Relation Name Age of Onset    Diabetes Mother      Hypertension Mother      Heart attack Mother      Obesity Mother      Other (cardiac disorder) Mother      Diabetes Father      Hypertension Father      Other (cardiac disorder) Father      Lung disease  Father      Obesity Father       Oncology History   Neoplasm of prostate, distant metastasis staging category M1b: metastasis to bone (Multi)   10/18/2023 Initial Diagnosis    Neoplasm of prostate, distant metastasis staging category M1b: metastasis to bone (CMS/HCC)     2/21/2024 -  Chemotherapy    Enzalutamide, 84 Day Cycles     Prostate cancer metastatic to bone (Multi)   12/2/2023 Initial Diagnosis    Prostate cancer metastatic to bone (CMS/HCC)     2/21/2024 -  Chemotherapy    Enzalutamide, 84 Day Cycles         Jose George  reports that he has never smoked. He has never used smokeless tobacco.  He  reports that he does not currently use alcohol.  He  reports that he does not currently use drugs.    Physical Exam  Vitals reviewed.   Constitutional:       Appearance: Normal appearance.   HENT:      Head: Normocephalic.      Mouth/Throat:      Mouth: Mucous membranes are moist.   Eyes:      Extraocular Movements: Extraocular movements intact.      Pupils: Pupils are equal, round, and reactive to light.   Cardiovascular:      Rate and Rhythm: Normal rate and regular rhythm.      Pulses: Normal pulses.      Heart sounds: Normal heart sounds.   Pulmonary:      Effort: Pulmonary effort is normal.      Breath sounds: Normal breath sounds.   Abdominal:      General: Bowel sounds are normal.      Palpations: Abdomen is soft.   Musculoskeletal:         General: Normal range of motion.      Cervical back: Normal range of motion and neck supple.   Skin:     General: Skin is warm and dry.   Neurological:      General: No focal deficit present.      Mental Status: He is alert and oriented to person, place, and time.   Psychiatric:         Mood and Affect: Mood normal.         Behavior: Behavior normal.         WBC   Date/Time Value Ref Range Status   06/28/2024 11:29 AM 4.7 4.4 - 11.3 x10*3/uL Final   05/17/2024 10:27 AM 3.4 (L) 4.4 - 11.3 x10*3/uL Final   04/22/2024 11:35 AM 4.6 4.4 - 11.3 x10*3/uL Final     nRBC    Date Value Ref Range Status   02/01/2024 0.0 0.0 - 0.0 /100 WBCs Final   01/03/2023 0.0 0.0 - 0.0 /100 WBC Final   08/29/2022 0.0 0.0 - 0.0 /100 WBC Final   07/14/2022 0.0 0.0 - 0.0 /100 WBC Final     RBC   Date Value Ref Range Status   06/28/2024 4.26 (L) 4.50 - 5.90 x10*6/uL Final   05/17/2024 4.42 (L) 4.50 - 5.90 x10*6/uL Final   04/22/2024 4.43 (L) 4.50 - 5.90 x10*6/uL Final     Hemoglobin   Date Value Ref Range Status   06/28/2024 13.2 (L) 13.5 - 17.5 g/dL Final   05/17/2024 13.5 13.5 - 17.5 g/dL Final   04/22/2024 13.5 13.5 - 17.5 g/dL Final     Hematocrit   Date Value Ref Range Status   06/28/2024 37.9 (L) 41.0 - 52.0 % Final   05/17/2024 40.1 (L) 41.0 - 52.0 % Final   04/22/2024 39.5 (L) 41.0 - 52.0 % Final     MCV   Date/Time Value Ref Range Status   06/28/2024 11:29 AM 89 80 - 100 fL Final   05/17/2024 10:27 AM 91 80 - 100 fL Final   04/22/2024 11:35 AM 89 80 - 100 fL Final     MCH   Date/Time Value Ref Range Status   06/28/2024 11:29 AM 31.0 26.0 - 34.0 pg Final   05/17/2024 10:27 AM 30.5 26.0 - 34.0 pg Final   04/22/2024 11:35 AM 30.5 26.0 - 34.0 pg Final     MCHC   Date/Time Value Ref Range Status   06/28/2024 11:29 AM 34.8 32.0 - 36.0 g/dL Final   05/17/2024 10:27 AM 33.7 32.0 - 36.0 g/dL Final   04/22/2024 11:35 AM 34.2 32.0 - 36.0 g/dL Final     RDW   Date/Time Value Ref Range Status   06/28/2024 11:29 AM 13.5 11.5 - 14.5 % Final   05/17/2024 10:27 AM 13.1 11.5 - 14.5 % Final   04/22/2024 11:35 AM 13.0 11.5 - 14.5 % Final     Platelets   Date/Time Value Ref Range Status   06/28/2024 11:29  150 - 450 x10*3/uL Final   05/17/2024 10:27  150 - 450 x10*3/uL Final   04/22/2024 11:35  150 - 450 x10*3/uL Final     MPV   Date/Time Value Ref Range Status   10/19/2023 10:08 AM 9.8 7.5 - 11.5 fL Final     Neutrophils %   Date/Time Value Ref Range Status   06/28/2024 11:29 AM 64.8 40.0 - 80.0 % Final   05/17/2024 10:27 AM 57.2 40.0 - 80.0 % Final   04/22/2024 11:35 AM 64.1 40.0 - 80.0 % Final      Immature Granulocytes %, Automated   Date/Time Value Ref Range Status   06/28/2024 11:29 AM 0.4 0.0 - 0.9 % Final     Comment:     Immature Granulocyte Count (IG) includes promyelocytes, myelocytes and metamyelocytes but does not include bands. Percent differential counts (%) should be interpreted in the context of the absolute cell counts (cells/UL).   05/17/2024 10:27 AM 0.6 0.0 - 0.9 % Final     Comment:     Immature Granulocyte Count (IG) includes promyelocytes, myelocytes and metamyelocytes but does not include bands. Percent differential counts (%) should be interpreted in the context of the absolute cell counts (cells/UL).   04/22/2024 11:35 AM 0.2 0.0 - 0.9 % Final     Comment:     Immature Granulocyte Count (IG) includes promyelocytes, myelocytes and metamyelocytes but does not include bands. Percent differential counts (%) should be interpreted in the context of the absolute cell counts (cells/UL).     Lymphocytes %   Date/Time Value Ref Range Status   06/28/2024 11:29 AM 22.1 13.0 - 44.0 % Final   05/17/2024 10:27 AM 27.8 13.0 - 44.0 % Final   04/22/2024 11:35 AM 24.1 13.0 - 44.0 % Final     Monocytes %   Date/Time Value Ref Range Status   06/28/2024 11:29 AM 7.2 2.0 - 10.0 % Final   05/17/2024 10:27 AM 9.3 2.0 - 10.0 % Final   04/22/2024 11:35 AM 8.1 2.0 - 10.0 % Final     Eosinophils %   Date/Time Value Ref Range Status   06/28/2024 11:29 AM 4.9 0.0 - 6.0 % Final   05/17/2024 10:27 AM 4.5 0.0 - 6.0 % Final   04/22/2024 11:35 AM 3.1 0.0 - 6.0 % Final     Basophils %   Date/Time Value Ref Range Status   06/28/2024 11:29 AM 0.6 0.0 - 2.0 % Final   05/17/2024 10:27 AM 0.6 0.0 - 2.0 % Final   04/22/2024 11:35 AM 0.4 0.0 - 2.0 % Final     Neutrophils Absolute   Date/Time Value Ref Range Status   06/28/2024 11:29 AM 3.05 1.60 - 5.50 x10*3/uL Final     Comment:     Percent differential counts (%) should be interpreted in the context of the absolute cell counts (cells/uL).   05/17/2024 10:27 AM 1.92 1.60 -  "5.50 x10*3/uL Final     Comment:     Percent differential counts (%) should be interpreted in the context of the absolute cell counts (cells/uL).   04/22/2024 11:35 AM 2.93 1.60 - 5.50 x10*3/uL Final     Comment:     Percent differential counts (%) should be interpreted in the context of the absolute cell counts (cells/uL).     Immature Granulocytes Absolute, Automated   Date/Time Value Ref Range Status   06/28/2024 11:29 AM 0.02 0.00 - 0.50 x10*3/uL Final   05/17/2024 10:27 AM 0.02 0.00 - 0.50 x10*3/uL Final   04/22/2024 11:35 AM 0.01 0.00 - 0.50 x10*3/uL Final     Lymphocytes Absolute   Date/Time Value Ref Range Status   06/28/2024 11:29 AM 1.04 0.80 - 3.00 x10*3/uL Final   05/17/2024 10:27 AM 0.93 0.80 - 3.00 x10*3/uL Final   04/22/2024 11:35 AM 1.10 0.80 - 3.00 x10*3/uL Final     Monocytes Absolute   Date/Time Value Ref Range Status   06/28/2024 11:29 AM 0.34 0.05 - 0.80 x10*3/uL Final   05/17/2024 10:27 AM 0.31 0.05 - 0.80 x10*3/uL Final   04/22/2024 11:35 AM 0.37 0.05 - 0.80 x10*3/uL Final     Eosinophils Absolute   Date/Time Value Ref Range Status   06/28/2024 11:29 AM 0.23 0.00 - 0.40 x10*3/uL Final   05/17/2024 10:27 AM 0.15 0.00 - 0.40 x10*3/uL Final   04/22/2024 11:35 AM 0.14 0.00 - 0.40 x10*3/uL Final     Basophils Absolute   Date/Time Value Ref Range Status   06/28/2024 11:29 AM 0.03 0.00 - 0.10 x10*3/uL Final   05/17/2024 10:27 AM 0.02 0.00 - 0.10 x10*3/uL Final   04/22/2024 11:35 AM 0.02 0.00 - 0.10 x10*3/uL Final       No components found for: \"PT\"  aPTT   Date/Time Value Ref Range Status   05/19/2022 10:56 AM 31 26 - 39 sec Final     Comment:       THE APTT IS NO LONGER USED FOR MONITORING     UNFRACTIONATED HEPARIN THERAPY.    FOR MONITORING HEPARIN THERAPY,     USE THE HEPARIN ASSAY.       Medication Documentation Review Audit       Reviewed by Coleen Abernathy RN (Registered Nurse) on 07/03/24 at 1522      Medication Order Taking? Sig Documenting Provider Last Dose Status   dupilumab (Dupixent " Pen) 300 mg/2 mL injection [id number] Yes Inject under the skin 1 time. A month Historical Provider, MD Taking Active   enzalutamide (Xtandi) 40 mg tablet [id number] Yes Take 4 tablets (160 mg total) by mouth once daily.  Take with or without food at the same time each day. Do not crush, break, or dissolve. Swallow it whole. Gabe Garza MD Taking Active   enzalutamide (Xtandi) 40 mg tablet [id number]  Take 4 tablets (160 mg total) by mouth once daily.  Take with or without food at the same time each day. Do not crush, break, or dissolve. Swallow it whole. Gabe Garza MD  Active   leuprolide, 6-month, (Lupron Depot, 6 Month,) 45 mg injection [id number]  Inject into the muscle every 6 months. Historical Provider, MD  Active   Xtandi 40 mg capsule [id number]  Take 4 capsules (160 mg total) by mouth once daily.   Patient not taking: Reported on 5/21/2024    Gabe Garza MD  Active                   Assessment/Plan    1) prostate cancer  -here for interval followup  -continues to take Xtandi 160 mg, dailly  -Labs done on 5/17/2024 included CBC, COMP, PSA  -results reviewed: WBC 3.4, Hgb 13.5, platelets 210,000, PSA pending (had to be added on), creatinine 0.96, AST 14, ALT 8  -last lupron 6 month depot injection received 5/21/2024  ; next lupron dose due 11/2024  -PSMA PET done on 5/10/2024 reviewed-no abnormal focal Ga68 PSMA uptake in neck and chest; no abnormal Ga68 PSMA uptake within abdominal lymph nodes; mild heterogeneous Ga68 uptake seen in prostate gland with SUV of 1.9; no abnormal Ga68 PSMA uptake is seen within periprostatic and pelvic lymph nodes; there is markedly intense gallium 68 PSMA uptake in L5-- previously noted foci on 2021 bone scan in bilateral acetabulae, right ischium and sacrum are not evidence of current PET  -as he has such limited new disease, I don't think we need to radically alter his treatment (such as switch to chemo with docetaxel)  -Advised patient to continue Xtandi without  change for now  -will refer to radiation oncology to consider treatment of L5 metastasis  -per Dr Smith, patient's insurance initially rejected requested for SBRT, however, Dr Smith was able to appeal the decision, and so SBRT is now approved--patient is awaiting start of SBRT (2 fractions planned)  -labs done on 6/28/20224 included CBC, COMP, PSA  -results reviewed--wbc 4.7, hgb 13.2, plt 249,000, creatinine 1.03, alk phos 77, AST 12, ALT 8  -will see him again in 6 weeks, PSA 11.38  -if a few months after completion of SBRT, the PSA still does not trend down, then will consider switching to zytiga + prednisone     2) eczema  -on dupixent     3) left leg DVT  -on eliquis  -as he now has been confirmed to have prostate cancer, the cancer was most likely the provoking factor the DVT, as 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     4) bone metastases  -secondary to prostate cancer  -as androgen deprivation therapy is expected to diminish bone density over time, will consider addition of either bisphosphonate (zometa) or RANKL inhibitor (xgeva) to help build back bone density and to minimize risk for pathologic fracture  -he recently fell and broke his right elbow --> injured/twisted his left hip in the process  -DEXA was booked for 1/10/2024 but he cancelled appointment because he caught Covid  -DEXA done on 3/8/2024 consistent with osteoporosis     Problem List Items Addressed This Visit             ICD-10-CM    Neoplasm of prostate, distant metastasis staging category M1b: metastasis to bone (Multi) C61, C79.51    Prostate cancer metastatic to bone (Multi) C61, C79.51    Relevant Orders    Clinic Appointment Request Follow Up; GABE GARZA; [id number]    CBC and Auto Differential    Comprehensive metabolic panel    Prostate Specific Antigen            Gabe Garza MD

## 2024-07-08 NOTE — PROGRESS NOTES
Radiation Oncology Treatment Summary    Patient Name:  Jose George  MRN:  [id number]  :  1950    Referring Provider: No ref. provider found  Primary Care Provider: Jatin Soriano DO    Brief History: Jose George is a 73 y.o. male with No matching staging information was found for the patient..  The patient completed radiotherapy as outlined below.    Radiation Treatment Summary:    SBRT: Lumbar spine    Treatment Period Technique Fraction Dose Fractions Total Dose   Course 1 7/3/2024-2024  (days elapsed: 2)         L5 7/3/2024-2024 SBRT 1200 / 1,200 cGy 2  2 2400 / 2,400 cGy       Please see the patient's [facility] chart for further details regarding the radiation plan, including beam energy.    Concurrent Chemotherapy:  Treatment Plans       Name Type Plan Dates Plan Provider         Active    Enzalutamide, 84 Day Cycles Oncology Treatment  2024 - Present Gabe Garza MD                    [facility]CAE Toxicity Overview:   Toxicity Assessment          7/3/2024    15:00   Toxicity Assessment   Adverse Events Reviewed (WDL) Yes (Within Defined Limits)   Treatment Site Bone   Anorexia Grade 1       Baseline x2 years   Diarrhea Grade 0   Fatigue Grade 0   Nausea Grade 0   Pain Grade 0   Bone Pain Grade 0     Patient Disposition: Follow up with Dr. Garza scheduled on 2024 @ 10:20 AM.

## 2024-07-30 ENCOUNTER — LAB (OUTPATIENT)
Dept: LAB | Facility: CLINIC | Age: 74
End: 2024-07-30
Payer: MEDICARE

## 2024-07-30 DIAGNOSIS — C79.51 PROSTATE CANCER METASTATIC TO BONE (MULTI): ICD-10-CM

## 2024-07-30 DIAGNOSIS — C61 PROSTATE CANCER METASTATIC TO BONE (MULTI): ICD-10-CM

## 2024-07-30 LAB
ALBUMIN SERPL BCP-MCNC: 3.8 G/DL (ref 3.4–5)
ALP SERPL-CCNC: 62 U/L (ref 33–136)
ALT SERPL W P-5'-P-CCNC: 8 U/L (ref 10–52)
ANION GAP SERPL CALC-SCNC: 12 MMOL/L (ref 10–20)
AST SERPL W P-5'-P-CCNC: 13 U/L (ref 9–39)
BASOPHILS # BLD AUTO: 0.01 X10*3/UL (ref 0–0.1)
BASOPHILS NFR BLD AUTO: 0.3 %
BILIRUB SERPL-MCNC: 0.6 MG/DL (ref 0–1.2)
BUN SERPL-MCNC: 17 MG/DL (ref 6–23)
CALCIUM SERPL-MCNC: 9.3 MG/DL (ref 8.6–10.3)
CHLORIDE SERPL-SCNC: 104 MMOL/L (ref 98–107)
CO2 SERPL-SCNC: 27 MMOL/L (ref 21–32)
CREAT SERPL-MCNC: 1.04 MG/DL (ref 0.5–1.3)
EGFRCR SERPLBLD CKD-EPI 2021: 76 ML/MIN/1.73M*2
EOSINOPHIL # BLD AUTO: 0.13 X10*3/UL (ref 0–0.4)
EOSINOPHIL NFR BLD AUTO: 4.3 %
ERYTHROCYTE [DISTWIDTH] IN BLOOD BY AUTOMATED COUNT: 13.4 % (ref 11.5–14.5)
GLUCOSE SERPL-MCNC: 103 MG/DL (ref 74–99)
HCT VFR BLD AUTO: 39.9 % (ref 41–52)
HGB BLD-MCNC: 13.2 G/DL (ref 13.5–17.5)
IMM GRANULOCYTES # BLD AUTO: 0.01 X10*3/UL (ref 0–0.5)
IMM GRANULOCYTES NFR BLD AUTO: 0.3 % (ref 0–0.9)
LYMPHOCYTES # BLD AUTO: 0.66 X10*3/UL (ref 0.8–3)
LYMPHOCYTES NFR BLD AUTO: 21.8 %
MCH RBC QN AUTO: 30.8 PG (ref 26–34)
MCHC RBC AUTO-ENTMCNC: 33.1 G/DL (ref 32–36)
MCV RBC AUTO: 93 FL (ref 80–100)
MONOCYTES # BLD AUTO: 0.28 X10*3/UL (ref 0.05–0.8)
MONOCYTES NFR BLD AUTO: 9.2 %
NEUTROPHILS # BLD AUTO: 1.94 X10*3/UL (ref 1.6–5.5)
NEUTROPHILS NFR BLD AUTO: 64.1 %
PLATELET # BLD AUTO: 200 X10*3/UL (ref 150–450)
POTASSIUM SERPL-SCNC: 3.7 MMOL/L (ref 3.5–5.3)
PROT SERPL-MCNC: 6.3 G/DL (ref 6.4–8.2)
PSA SERPL-MCNC: 7.77 NG/ML
RBC # BLD AUTO: 4.28 X10*6/UL (ref 4.5–5.9)
SODIUM SERPL-SCNC: 139 MMOL/L (ref 136–145)
WBC # BLD AUTO: 3 X10*3/UL (ref 4.4–11.3)

## 2024-07-30 PROCEDURE — 80053 COMPREHEN METABOLIC PANEL: CPT

## 2024-07-30 PROCEDURE — 36415 COLL VENOUS BLD VENIPUNCTURE: CPT

## 2024-07-30 PROCEDURE — 84153 ASSAY OF PSA TOTAL: CPT

## 2024-07-30 PROCEDURE — 85025 COMPLETE CBC W/AUTO DIFF WBC: CPT

## 2024-08-02 ENCOUNTER — OFFICE VISIT (OUTPATIENT)
Dept: HEMATOLOGY/ONCOLOGY | Facility: CLINIC | Age: 74
End: 2024-08-02
Payer: MEDICARE

## 2024-08-02 VITALS
WEIGHT: 182.32 LBS | HEART RATE: 64 BPM | BODY MASS INDEX: 28.99 KG/M2 | OXYGEN SATURATION: 95 % | DIASTOLIC BLOOD PRESSURE: 84 MMHG | TEMPERATURE: 96.3 F | SYSTOLIC BLOOD PRESSURE: 127 MMHG | RESPIRATION RATE: 16 BRPM

## 2024-08-02 DIAGNOSIS — C79.51 PROSTATE CANCER METASTATIC TO BONE (MULTI): ICD-10-CM

## 2024-08-02 DIAGNOSIS — C61 PROSTATE CANCER METASTATIC TO BONE (MULTI): ICD-10-CM

## 2024-08-02 DIAGNOSIS — I82.532 CHRONIC DEEP VEIN THROMBOSIS (DVT) OF POPLITEAL VEIN OF LEFT LOWER EXTREMITY (MULTI): ICD-10-CM

## 2024-08-02 DIAGNOSIS — L30.9 ECZEMA, UNSPECIFIED TYPE: Primary | ICD-10-CM

## 2024-08-02 DIAGNOSIS — C61 NEOPLASM OF PROSTATE, DISTANT METASTASIS STAGING CATEGORY M1B: METASTASIS TO BONE (MULTI): ICD-10-CM

## 2024-08-02 DIAGNOSIS — C79.51 NEOPLASM OF PROSTATE, DISTANT METASTASIS STAGING CATEGORY M1B: METASTASIS TO BONE (MULTI): ICD-10-CM

## 2024-08-02 PROCEDURE — 1159F MED LIST DOCD IN RCRD: CPT | Performed by: INTERNAL MEDICINE

## 2024-08-02 PROCEDURE — 99214 OFFICE O/P EST MOD 30 MIN: CPT | Performed by: INTERNAL MEDICINE

## 2024-08-02 PROCEDURE — 1126F AMNT PAIN NOTED NONE PRSNT: CPT | Performed by: INTERNAL MEDICINE

## 2024-08-02 ASSESSMENT — ENCOUNTER SYMPTOMS
EYES NEGATIVE: 1
RESPIRATORY NEGATIVE: 1
MUSCULOSKELETAL NEGATIVE: 1
NEUROLOGICAL NEGATIVE: 1
CARDIOVASCULAR NEGATIVE: 1
PSYCHIATRIC NEGATIVE: 1
ENDOCRINE NEGATIVE: 1
CONSTITUTIONAL NEGATIVE: 1
GASTROINTESTINAL NEGATIVE: 1
HEMATOLOGIC/LYMPHATIC NEGATIVE: 1

## 2024-08-02 ASSESSMENT — PAIN SCALES - GENERAL: PAINLEVEL: 0-NO PAIN

## 2024-08-02 NOTE — PROGRESS NOTES
Patient ID: Jose George is a 73 y.o. male.  Referring Physician: No referring provider defined for this encounter.  Primary Care Provider: Jatin Soriano DO  Visit Type: Follow Up      Subjective    HPI How was my labwork?    Review of Systems   Constitutional: Negative.    HENT:  Negative.     Eyes: Negative.    Respiratory: Negative.     Cardiovascular: Negative.    Gastrointestinal: Negative.    Endocrine: Negative.    Genitourinary: Negative.     Musculoskeletal: Negative.    Skin: Negative.    Neurological: Negative.    Hematological: Negative.    Psychiatric/Behavioral: Negative.          Objective   BSA: 1.97 meters squared  /84 (BP Location: Right arm, Patient Position: Sitting, BP Cuff Size: Adult)   Pulse 64   Temp 35.7 °C (96.3 °F) (Temporal)   Resp 16   Wt 82.7 kg (182 lb 5.1 oz)   SpO2 95%   BMI 28.99 kg/m²      has a past medical history of Allergic contact dermatitis, unspecified cause, Contact with and (suspected) exposure to covid-19 (12/08/2020), Localized edema (03/05/2021), Nodular prostate without lower urinary tract symptoms (03/25/2021), Olecranon bursitis, left elbow (03/05/2021), Personal history of malignant neoplasm of prostate (05/06/2021), Personal history of other diseases of the circulatory system, Personal history of other diseases of the musculoskeletal system and connective tissue, Personal history of other diseases of urinary system (04/26/2021), Personal history of other specified conditions (03/25/2021), and Sprain of unspecified ligament of left ankle, initial encounter (02/23/2021).   has a past surgical history that includes Other surgical history (12/08/2020) and Other surgical history (12/08/2020).  Family History   Problem Relation Name Age of Onset    Diabetes Mother      Hypertension Mother      Heart attack Mother      Obesity Mother      Other (cardiac disorder) Mother      Diabetes Father      Hypertension Father      Other (cardiac disorder)  Father      Lung disease Father      Obesity Father       Oncology History   Neoplasm of prostate, distant metastasis staging category M1b: metastasis to bone (Multi)   10/18/2023 Initial Diagnosis    Neoplasm of prostate, distant metastasis staging category M1b: metastasis to bone (CMS/HCC)     2/21/2024 -  Chemotherapy    Enzalutamide, 84 Day Cycles     Prostate cancer metastatic to bone (Multi)   12/2/2023 Initial Diagnosis    Prostate cancer metastatic to bone (CMS/HCC)     2/21/2024 -  Chemotherapy    Enzalutamide, 84 Day Cycles         Jose George  reports that he has never smoked. He has never used smokeless tobacco.  He  reports that he does not currently use alcohol.  He  reports that he does not currently use drugs.    Physical Exam  Vitals reviewed.   Constitutional:       Appearance: Normal appearance.   HENT:      Head: Normocephalic.      Mouth/Throat:      Mouth: Mucous membranes are moist.   Eyes:      Extraocular Movements: Extraocular movements intact.      Pupils: Pupils are equal, round, and reactive to light.   Cardiovascular:      Rate and Rhythm: Normal rate and regular rhythm.      Heart sounds: Normal heart sounds.   Pulmonary:      Breath sounds: Normal breath sounds.   Abdominal:      General: Bowel sounds are normal.      Palpations: Abdomen is soft.   Musculoskeletal:         General: Normal range of motion.      Cervical back: Normal range of motion and neck supple.   Skin:     General: Skin is warm.   Neurological:      General: No focal deficit present.      Mental Status: He is alert and oriented to person, place, and time.   Psychiatric:         Mood and Affect: Mood normal.         Behavior: Behavior normal.         WBC   Date/Time Value Ref Range Status   07/30/2024 09:59 AM 3.0 (L) 4.4 - 11.3 x10*3/uL Final   06/28/2024 11:29 AM 4.7 4.4 - 11.3 x10*3/uL Final   05/17/2024 10:27 AM 3.4 (L) 4.4 - 11.3 x10*3/uL Final     [facility]   Date Value Ref Range Status   02/01/2024 0.0 0.0 -  0.0 /100 WBCs Final   01/03/2023 0.0 0.0 - 0.0 /100 WBC Final   08/29/2022 0.0 0.0 - 0.0 /100 WBC Final   07/14/2022 0.0 0.0 - 0.0 /100 WBC Final     RBC   Date Value Ref Range Status   07/30/2024 4.28 (L) 4.50 - 5.90 x10*6/uL Final   06/28/2024 4.26 (L) 4.50 - 5.90 x10*6/uL Final   05/17/2024 4.42 (L) 4.50 - 5.90 x10*6/uL Final     Hemoglobin   Date Value Ref Range Status   07/30/2024 13.2 (L) 13.5 - 17.5 g/dL Final   06/28/2024 13.2 (L) 13.5 - 17.5 g/dL Final   05/17/2024 13.5 13.5 - 17.5 g/dL Final     Hematocrit   Date Value Ref Range Status   07/30/2024 39.9 (L) 41.0 - 52.0 % Final   06/28/2024 37.9 (L) 41.0 - 52.0 % Final   05/17/2024 40.1 (L) 41.0 - 52.0 % Final     MCV   Date/Time Value Ref Range Status   07/30/2024 09:59 AM 93 80 - 100 fL Final   06/28/2024 11:29 AM 89 80 - 100 fL Final   05/17/2024 10:27 AM 91 80 - 100 fL Final     MCH   Date/Time Value Ref Range Status   07/30/2024 09:59 AM 30.8 26.0 - 34.0 pg Final   06/28/2024 11:29 AM 31.0 26.0 - 34.0 pg Final   05/17/2024 10:27 AM 30.5 26.0 - 34.0 pg Final     MCHC   Date/Time Value Ref Range Status   07/30/2024 09:59 AM 33.1 32.0 - 36.0 g/dL Final   06/28/2024 11:29 AM 34.8 32.0 - 36.0 g/dL Final   05/17/2024 10:27 AM 33.7 32.0 - 36.0 g/dL Final     RDW   Date/Time Value Ref Range Status   07/30/2024 09:59 AM 13.4 11.5 - 14.5 % Final   06/28/2024 11:29 AM 13.5 11.5 - 14.5 % Final   05/17/2024 10:27 AM 13.1 11.5 - 14.5 % Final     Platelets   Date/Time Value Ref Range Status   07/30/2024 09:59  150 - 450 x10*3/uL Final   06/28/2024 11:29  150 - 450 x10*3/uL Final   05/17/2024 10:27  150 - 450 x10*3/uL Final     MPV   Date/Time Value Ref Range Status   10/19/2023 10:08 AM 9.8 7.5 - 11.5 fL Final     Neutrophils %   Date/Time Value Ref Range Status   07/30/2024 09:59 AM 64.1 40.0 - 80.0 % Final   06/28/2024 11:29 AM 64.8 40.0 - 80.0 % Final   05/17/2024 10:27 AM 57.2 40.0 - 80.0 % Final     Immature Granulocytes %, Automated    Date/Time Value Ref Range Status   07/30/2024 09:59 AM 0.3 0.0 - 0.9 % Final     Comment:     Immature Granulocyte Count (IG) includes promyelocytes, myelocytes and metamyelocytes but does not include bands. Percent differential counts (%) should be interpreted in the context of the absolute cell counts (cells/UL).   06/28/2024 11:29 AM 0.4 0.0 - 0.9 % Final     Comment:     Immature Granulocyte Count (IG) includes promyelocytes, myelocytes and metamyelocytes but does not include bands. Percent differential counts (%) should be interpreted in the context of the absolute cell counts (cells/UL).   05/17/2024 10:27 AM 0.6 0.0 - 0.9 % Final     Comment:     Immature Granulocyte Count (IG) includes promyelocytes, myelocytes and metamyelocytes but does not include bands. Percent differential counts (%) should be interpreted in the context of the absolute cell counts (cells/UL).     Lymphocytes %   Date/Time Value Ref Range Status   07/30/2024 09:59 AM 21.8 13.0 - 44.0 % Final   06/28/2024 11:29 AM 22.1 13.0 - 44.0 % Final   05/17/2024 10:27 AM 27.8 13.0 - 44.0 % Final     Monocytes %   Date/Time Value Ref Range Status   07/30/2024 09:59 AM 9.2 2.0 - 10.0 % Final   06/28/2024 11:29 AM 7.2 2.0 - 10.0 % Final   05/17/2024 10:27 AM 9.3 2.0 - 10.0 % Final     Eosinophils %   Date/Time Value Ref Range Status   07/30/2024 09:59 AM 4.3 0.0 - 6.0 % Final   06/28/2024 11:29 AM 4.9 0.0 - 6.0 % Final   05/17/2024 10:27 AM 4.5 0.0 - 6.0 % Final     Basophils %   Date/Time Value Ref Range Status   07/30/2024 09:59 AM 0.3 0.0 - 2.0 % Final   06/28/2024 11:29 AM 0.6 0.0 - 2.0 % Final   05/17/2024 10:27 AM 0.6 0.0 - 2.0 % Final     Neutrophils Absolute   Date/Time Value Ref Range Status   07/30/2024 09:59 AM 1.94 1.60 - 5.50 x10*3/uL Final     Comment:     Percent differential counts (%) should be interpreted in the context of the absolute cell counts (cells/uL).   06/28/2024 11:29 AM 3.05 1.60 - 5.50 x10*3/uL Final     Comment:      "Percent differential counts (%) should be interpreted in the context of the absolute cell counts (cells/uL).   05/17/2024 10:27 AM 1.92 1.60 - 5.50 x10*3/uL Final     Comment:     Percent differential counts (%) should be interpreted in the context of the absolute cell counts (cells/uL).     Immature Granulocytes Absolute, Automated   Date/Time Value Ref Range Status   07/30/2024 09:59 AM 0.01 0.00 - 0.50 x10*3/uL Final   06/28/2024 11:29 AM 0.02 0.00 - 0.50 x10*3/uL Final   05/17/2024 10:27 AM 0.02 0.00 - 0.50 x10*3/uL Final     Lymphocytes Absolute   Date/Time Value Ref Range Status   07/30/2024 09:59 AM 0.66 (L) 0.80 - 3.00 x10*3/uL Final   06/28/2024 11:29 AM 1.04 0.80 - 3.00 x10*3/uL Final   05/17/2024 10:27 AM 0.93 0.80 - 3.00 x10*3/uL Final     Monocytes Absolute   Date/Time Value Ref Range Status   07/30/2024 09:59 AM 0.28 0.05 - 0.80 x10*3/uL Final   06/28/2024 11:29 AM 0.34 0.05 - 0.80 x10*3/uL Final   05/17/2024 10:27 AM 0.31 0.05 - 0.80 x10*3/uL Final     Eosinophils Absolute   Date/Time Value Ref Range Status   07/30/2024 09:59 AM 0.13 0.00 - 0.40 x10*3/uL Final   06/28/2024 11:29 AM 0.23 0.00 - 0.40 x10*3/uL Final   05/17/2024 10:27 AM 0.15 0.00 - 0.40 x10*3/uL Final     Basophils Absolute   Date/Time Value Ref Range Status   07/30/2024 09:59 AM 0.01 0.00 - 0.10 x10*3/uL Final   06/28/2024 11:29 AM 0.03 0.00 - 0.10 x10*3/uL Final   05/17/2024 10:27 AM 0.02 0.00 - 0.10 x10*3/uL Final       No components found for: \"PT\"  aPTT   Date/Time Value Ref Range Status   05/19/2022 10:56 AM 31 26 - 39 sec Final     Comment:       THE APTT IS NO LONGER USED FOR MONITORING     UNFRACTIONATED HEPARIN THERAPY.    FOR MONITORING HEPARIN THERAPY,     USE THE HEPARIN ASSAY.       Medication Documentation Review Audit       Reviewed by Christina Levine MA (Medical Assistant) on 08/02/24 at 1030      Medication Order Taking? Sig Documenting Provider Last Dose Status   dupilumab (Dupixent Pen) 300 mg/2 mL injection [id number] " Yes Inject under the skin 1 time. A month Historical Provider, MD Taking Active   enzalutamide (Xtandi) 40 mg tablet [id number] Yes Take 4 tablets (160 mg total) by mouth once daily.  Take with or without food at the same time each day. Do not crush, break, or dissolve. Swallow it whole. Gabe Garza MD Taking Active   enzalutamide (Xtandi) 40 mg tablet [id number] Yes Take 4 tablets (160 mg total) by mouth once daily.  Take with or without food at the same time each day. Do not crush, break, or dissolve. Swallow it whole. Gabe Garza MD Taking Active   leuprolide, 6-month, (Lupron Depot, 6 Month,) 45 mg injection [id number] Yes Inject into the muscle every 6 months. Historical Provider, MD Taking Active   Xtandi 40 mg capsule [id number] Yes Take 4 capsules (160 mg total) by mouth once daily. Gabe Garza MD Taking Active                   Assessment/Plan    1) prostate cancer  -here for interval followup  -continues to take Xtandi 160 mg, dailly  -last lupron 6 month depot injection received 5/21/2024  ; next lupron dose due 11/2024  -PSMA PET done on 5/10/2024 reviewed-no abnormal focal Ga68 PSMA uptake in neck and chest; no abnormal Ga68 PSMA uptake within abdominal lymph nodes; mild heterogeneous Ga68 uptake seen in prostate gland with SUV of 1.9; no abnormal Ga68 PSMA uptake is seen within periprostatic and pelvic lymph nodes; there is markedly intense gallium 68 PSMA uptake in L5-- previously noted foci on 2021 bone scan in bilateral acetabulae, right ischium and sacrum are not evidence of current PET  -as he has such limited new disease, I don't think we need to radically alter his treatment (such as switch to chemo with docetaxel)  -Advised patient to continue Xtandi without change for now  -will refer to radiation oncology to consider treatment of L5 metastasis  -per Dr Smith, patient's insurance initially rejected requested for SBRT, however, Dr Smith was able to appeal the decision, and so SBRT  is now approved--patient is awaiting start of SBRT (2 fractions planned)  -completed SBRT (7/3 + 7/5)  -labs done on 7/30/2024 included CBC, COMP, PSA  -results reviewed--wbc 3.0, hgb 13.2, plt 200,000, creatinine 1.04, alk phos 62, AST 13, ALT 8, PSA 7/77  -PSA trending down  -advised Bill to continue with xtandi  -will see him again in 6 weeks     2) eczema  -on dupixent     3) left leg DVT  -on eliquis  -as he now has been confirmed to have prostate cancer, the cancer was most likely the provoking factor the DVT, as 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     4) bone metastases  -secondary to prostate cancer  -as androgen deprivation therapy is expected to diminish bone density over time, will consider addition of either bisphosphonate (zometa) or RANKL inhibitor (xgeva) to help build back bone density and to minimize risk for pathologic fracture  -he recently fell and broke his right elbow --> injured/twisted his left hip in the process  -DEXA was booked for 1/10/2024 but he cancelled appointment because he caught Covid  -DEXA done on 3/8/2024 consistent with osteoporosis        Problem List Items Addressed This Visit             ICD-10-CM    Prostate cancer metastatic to bone (Multi) C61, C79.51    Relevant Orders    Clinic Appointment Request Follow Up; GABE GARZA; [id number]    CBC and Auto Differential    Comprehensive metabolic panel    Prostate Specific Antigen            Gabe Garza MD

## 2024-09-11 ENCOUNTER — LAB (OUTPATIENT)
Dept: LAB | Facility: CLINIC | Age: 74
End: 2024-09-11
Payer: MEDICARE

## 2024-09-11 DIAGNOSIS — C79.51 PROSTATE CANCER METASTATIC TO BONE (MULTI): ICD-10-CM

## 2024-09-11 DIAGNOSIS — C61 PROSTATE CANCER METASTATIC TO BONE (MULTI): ICD-10-CM

## 2024-09-11 LAB
ALBUMIN SERPL BCP-MCNC: 4.1 G/DL (ref 3.4–5)
ALP SERPL-CCNC: 44 U/L (ref 33–136)
ALT SERPL W P-5'-P-CCNC: 11 U/L (ref 10–52)
ANION GAP SERPL CALC-SCNC: 10 MMOL/L (ref 10–20)
AST SERPL W P-5'-P-CCNC: 21 U/L (ref 9–39)
BASOPHILS # BLD AUTO: 0.01 X10*3/UL (ref 0–0.1)
BASOPHILS NFR BLD AUTO: 0.3 %
BILIRUB SERPL-MCNC: 0.4 MG/DL (ref 0–1.2)
BUN SERPL-MCNC: 20 MG/DL (ref 6–23)
CALCIUM SERPL-MCNC: 9.3 MG/DL (ref 8.6–10.3)
CHLORIDE SERPL-SCNC: 103 MMOL/L (ref 98–107)
CO2 SERPL-SCNC: 29 MMOL/L (ref 21–32)
CREAT SERPL-MCNC: 1.04 MG/DL (ref 0.5–1.3)
EGFRCR SERPLBLD CKD-EPI 2021: 76 ML/MIN/1.73M*2
EOSINOPHIL # BLD AUTO: 0.08 X10*3/UL (ref 0–0.4)
EOSINOPHIL NFR BLD AUTO: 2.7 %
ERYTHROCYTE [DISTWIDTH] IN BLOOD BY AUTOMATED COUNT: 13.6 % (ref 11.5–14.5)
GLUCOSE SERPL-MCNC: 100 MG/DL (ref 74–99)
HCT VFR BLD AUTO: 41.7 % (ref 41–52)
HGB BLD-MCNC: 13.8 G/DL (ref 13.5–17.5)
IMM GRANULOCYTES # BLD AUTO: 0.01 X10*3/UL (ref 0–0.5)
IMM GRANULOCYTES NFR BLD AUTO: 0.3 % (ref 0–0.9)
LYMPHOCYTES # BLD AUTO: 0.61 X10*3/UL (ref 0.8–3)
LYMPHOCYTES NFR BLD AUTO: 20.7 %
MCH RBC QN AUTO: 30.9 PG (ref 26–34)
MCHC RBC AUTO-ENTMCNC: 33.1 G/DL (ref 32–36)
MCV RBC AUTO: 94 FL (ref 80–100)
MONOCYTES # BLD AUTO: 0.24 X10*3/UL (ref 0.05–0.8)
MONOCYTES NFR BLD AUTO: 8.1 %
NEUTROPHILS # BLD AUTO: 2 X10*3/UL (ref 1.6–5.5)
NEUTROPHILS NFR BLD AUTO: 67.9 %
NRBC BLD-RTO: ABNORMAL /100{WBCS}
PLATELET # BLD AUTO: 129 X10*3/UL (ref 150–450)
POTASSIUM SERPL-SCNC: 4.7 MMOL/L (ref 3.5–5.3)
PROT SERPL-MCNC: 6.6 G/DL (ref 6.4–8.2)
PSA SERPL-MCNC: 1.47 NG/ML
RBC # BLD AUTO: 4.46 X10*6/UL (ref 4.5–5.9)
RBC MORPH BLD: NORMAL
SODIUM SERPL-SCNC: 137 MMOL/L (ref 136–145)
WBC # BLD AUTO: 3 X10*3/UL (ref 4.4–11.3)

## 2024-09-11 PROCEDURE — 84153 ASSAY OF PSA TOTAL: CPT

## 2024-09-11 PROCEDURE — 80053 COMPREHEN METABOLIC PANEL: CPT

## 2024-09-11 PROCEDURE — 85025 COMPLETE CBC W/AUTO DIFF WBC: CPT

## 2024-09-11 PROCEDURE — 36415 COLL VENOUS BLD VENIPUNCTURE: CPT

## 2024-09-13 ENCOUNTER — OFFICE VISIT (OUTPATIENT)
Dept: HEMATOLOGY/ONCOLOGY | Facility: CLINIC | Age: 74
End: 2024-09-13
Payer: MEDICARE

## 2024-09-13 VITALS
BODY MASS INDEX: 28.08 KG/M2 | DIASTOLIC BLOOD PRESSURE: 69 MMHG | OXYGEN SATURATION: 97 % | TEMPERATURE: 96.8 F | RESPIRATION RATE: 16 BRPM | HEART RATE: 60 BPM | WEIGHT: 176.59 LBS | SYSTOLIC BLOOD PRESSURE: 106 MMHG

## 2024-09-13 DIAGNOSIS — C79.51 PROSTATE CANCER METASTATIC TO BONE (MULTI): Primary | ICD-10-CM

## 2024-09-13 DIAGNOSIS — C61 PROSTATE CANCER METASTATIC TO BONE (MULTI): Primary | ICD-10-CM

## 2024-09-13 DIAGNOSIS — I82.532 CHRONIC DEEP VEIN THROMBOSIS (DVT) OF POPLITEAL VEIN OF LEFT LOWER EXTREMITY (MULTI): ICD-10-CM

## 2024-09-13 DIAGNOSIS — L30.9 ECZEMA, UNSPECIFIED TYPE: ICD-10-CM

## 2024-09-13 PROCEDURE — 1159F MED LIST DOCD IN RCRD: CPT | Performed by: INTERNAL MEDICINE

## 2024-09-13 PROCEDURE — 1126F AMNT PAIN NOTED NONE PRSNT: CPT | Performed by: INTERNAL MEDICINE

## 2024-09-13 PROCEDURE — 99214 OFFICE O/P EST MOD 30 MIN: CPT | Performed by: INTERNAL MEDICINE

## 2024-09-13 ASSESSMENT — PAIN SCALES - GENERAL: PAINLEVEL: 0-NO PAIN

## 2024-09-13 NOTE — PROGRESS NOTES
Patient ID: Jose George is a 74 y.o. male.  Referring Physician: Gabe Garza MD  [address]  [place] [address]  Primary Care Provider: Jatin Soriano DO  Visit Type: Follow Up      Subjective    HPI How was my PSA?    Review of Systems   Constitutional: Negative.    HENT:  Negative.     Eyes: Negative.    Respiratory: Negative.     Cardiovascular: Negative.    Endocrine: Negative.    Genitourinary: Negative.     Musculoskeletal: Negative.    Skin: Negative.    Neurological: Negative.    Hematological: Negative.    Psychiatric/Behavioral: Negative.          Objective   BSA: There is no height or weight on file to calculate BSA.  There were no vitals taken for this visit.     has a past medical history of Allergic contact dermatitis, unspecified cause, Contact with and (suspected) exposure to covid-19 (12/08/2020), Localized edema (03/05/2021), Nodular prostate without lower urinary tract symptoms (03/25/2021), Olecranon bursitis, left elbow (03/05/2021), Personal history of malignant neoplasm of prostate (05/06/2021), Personal history of other diseases of the circulatory system, Personal history of other diseases of the musculoskeletal system and connective tissue, Personal history of other diseases of urinary system (04/26/2021), Personal history of other specified conditions (03/25/2021), and Sprain of unspecified ligament of left ankle, initial encounter (02/23/2021).   has a past surgical history that includes Other surgical history (12/08/2020) and Other surgical history (12/08/2020).  Family History   Problem Relation Name Age of Onset    Diabetes Mother      Hypertension Mother      Heart attack Mother      Obesity Mother      Other (cardiac disorder) Mother      Diabetes Father      Hypertension Father      Other (cardiac disorder) Father      Lung disease Father      Obesity Father       Oncology History   Neoplasm of prostate, distant metastasis staging category M1b:  metastasis to bone (Multi)   10/18/2023 Initial Diagnosis    Neoplasm of prostate, distant metastasis staging category M1b: metastasis to bone (CMS/HCC)     2/21/2024 -  Chemotherapy    Enzalutamide, 84 Day Cycles     Prostate cancer metastatic to bone (Multi)   12/2/2023 Initial Diagnosis    Prostate cancer metastatic to bone (CMS/HCC)     2/21/2024 -  Chemotherapy    Enzalutamide, 84 Day Cycles         Jose George  reports that he has never smoked. He has never used smokeless tobacco.  He  reports that he does not currently use alcohol.  He  reports that he does not currently use drugs.    Physical Exam  Vitals reviewed.   Constitutional:       Appearance: Normal appearance.   HENT:      Head: Normocephalic.      Mouth/Throat:      Mouth: Mucous membranes are moist.   Eyes:      Extraocular Movements: Extraocular movements intact.      Pupils: Pupils are equal, round, and reactive to light.   Cardiovascular:      Rate and Rhythm: Normal rate and regular rhythm.      Pulses: Normal pulses.      Heart sounds: Normal heart sounds.   Pulmonary:      Effort: Pulmonary effort is normal.      Breath sounds: Normal breath sounds.   Abdominal:      General: Bowel sounds are normal.      Palpations: Abdomen is soft.   Musculoskeletal:         General: Normal range of motion.      Cervical back: Normal range of motion and neck supple.   Skin:     General: Skin is warm.   Neurological:      General: No focal deficit present.      Mental Status: He is alert and oriented to person, place, and time.   Psychiatric:         Mood and Affect: Mood normal.         Behavior: Behavior normal.         WBC   Date/Time Value Ref Range Status   09/11/2024 10:10 AM 3.0 (L) 4.4 - 11.3 x10*3/uL Final   07/30/2024 09:59 AM 3.0 (L) 4.4 - 11.3 x10*3/uL Final   06/28/2024 11:29 AM 4.7 4.4 - 11.3 x10*3/uL Final     [facility]   Date Value Ref Range Status   09/11/2024   Final     Comment:     Not Measured   02/01/2024 0.0 0.0 - 0.0 /100 WBCs Final    01/03/2023 0.0 0.0 - 0.0 /100 WBC Final   08/29/2022 0.0 0.0 - 0.0 /100 WBC Final   07/14/2022 0.0 0.0 - 0.0 /100 WBC Final     RBC   Date Value Ref Range Status   09/11/2024 4.46 (L) 4.50 - 5.90 x10*6/uL Final   07/30/2024 4.28 (L) 4.50 - 5.90 x10*6/uL Final   06/28/2024 4.26 (L) 4.50 - 5.90 x10*6/uL Final     Hemoglobin   Date Value Ref Range Status   09/11/2024 13.8 13.5 - 17.5 g/dL Final   07/30/2024 13.2 (L) 13.5 - 17.5 g/dL Final   06/28/2024 13.2 (L) 13.5 - 17.5 g/dL Final     Hematocrit   Date Value Ref Range Status   09/11/2024 41.7 41.0 - 52.0 % Final   07/30/2024 39.9 (L) 41.0 - 52.0 % Final   06/28/2024 37.9 (L) 41.0 - 52.0 % Final     MCV   Date/Time Value Ref Range Status   09/11/2024 10:10 AM 94 80 - 100 fL Final   07/30/2024 09:59 AM 93 80 - 100 fL Final   06/28/2024 11:29 AM 89 80 - 100 fL Final     MCH   Date/Time Value Ref Range Status   09/11/2024 10:10 AM 30.9 26.0 - 34.0 pg Final   07/30/2024 09:59 AM 30.8 26.0 - 34.0 pg Final   06/28/2024 11:29 AM 31.0 26.0 - 34.0 pg Final     MCHC   Date/Time Value Ref Range Status   09/11/2024 10:10 AM 33.1 32.0 - 36.0 g/dL Final   07/30/2024 09:59 AM 33.1 32.0 - 36.0 g/dL Final   06/28/2024 11:29 AM 34.8 32.0 - 36.0 g/dL Final     RDW   Date/Time Value Ref Range Status   09/11/2024 10:10 AM 13.6 11.5 - 14.5 % Final   07/30/2024 09:59 AM 13.4 11.5 - 14.5 % Final   06/28/2024 11:29 AM 13.5 11.5 - 14.5 % Final     Platelets   Date/Time Value Ref Range Status   09/11/2024 10:10  (L) 150 - 450 x10*3/uL Final   07/30/2024 09:59  150 - 450 x10*3/uL Final   06/28/2024 11:29  150 - 450 x10*3/uL Final     MPV   Date/Time Value Ref Range Status   10/19/2023 10:08 AM 9.8 7.5 - 11.5 fL Final     Neutrophils %   Date/Time Value Ref Range Status   09/11/2024 10:10 AM 67.9 40.0 - 80.0 % Final   07/30/2024 09:59 AM 64.1 40.0 - 80.0 % Final   06/28/2024 11:29 AM 64.8 40.0 - 80.0 % Final     Immature Granulocytes %, Automated   Date/Time Value Ref Range  Status   09/11/2024 10:10 AM 0.3 0.0 - 0.9 % Final     Comment:     Immature Granulocyte Count (IG) includes promyelocytes, myelocytes and metamyelocytes but does not include bands. Percent differential counts (%) should be interpreted in the context of the absolute cell counts (cells/UL).   07/30/2024 09:59 AM 0.3 0.0 - 0.9 % Final     Comment:     Immature Granulocyte Count (IG) includes promyelocytes, myelocytes and metamyelocytes but does not include bands. Percent differential counts (%) should be interpreted in the context of the absolute cell counts (cells/UL).   06/28/2024 11:29 AM 0.4 0.0 - 0.9 % Final     Comment:     Immature Granulocyte Count (IG) includes promyelocytes, myelocytes and metamyelocytes but does not include bands. Percent differential counts (%) should be interpreted in the context of the absolute cell counts (cells/UL).     Lymphocytes %   Date/Time Value Ref Range Status   09/11/2024 10:10 AM 20.7 13.0 - 44.0 % Final   07/30/2024 09:59 AM 21.8 13.0 - 44.0 % Final   06/28/2024 11:29 AM 22.1 13.0 - 44.0 % Final     Monocytes %   Date/Time Value Ref Range Status   09/11/2024 10:10 AM 8.1 2.0 - 10.0 % Final   07/30/2024 09:59 AM 9.2 2.0 - 10.0 % Final   06/28/2024 11:29 AM 7.2 2.0 - 10.0 % Final     Eosinophils %   Date/Time Value Ref Range Status   09/11/2024 10:10 AM 2.7 0.0 - 6.0 % Final   07/30/2024 09:59 AM 4.3 0.0 - 6.0 % Final   06/28/2024 11:29 AM 4.9 0.0 - 6.0 % Final     Basophils %   Date/Time Value Ref Range Status   09/11/2024 10:10 AM 0.3 0.0 - 2.0 % Final   07/30/2024 09:59 AM 0.3 0.0 - 2.0 % Final   06/28/2024 11:29 AM 0.6 0.0 - 2.0 % Final     Neutrophils Absolute   Date/Time Value Ref Range Status   09/11/2024 10:10 AM 2.00 1.60 - 5.50 x10*3/uL Final     Comment:     Percent differential counts (%) should be interpreted in the context of the absolute cell counts (cells/uL).   07/30/2024 09:59 AM 1.94 1.60 - 5.50 x10*3/uL Final     Comment:     Percent differential counts (%)  "should be interpreted in the context of the absolute cell counts (cells/uL).   06/28/2024 11:29 AM 3.05 1.60 - 5.50 x10*3/uL Final     Comment:     Percent differential counts (%) should be interpreted in the context of the absolute cell counts (cells/uL).     Immature Granulocytes Absolute, Automated   Date/Time Value Ref Range Status   09/11/2024 10:10 AM 0.01 0.00 - 0.50 x10*3/uL Final   07/30/2024 09:59 AM 0.01 0.00 - 0.50 x10*3/uL Final   06/28/2024 11:29 AM 0.02 0.00 - 0.50 x10*3/uL Final     Lymphocytes Absolute   Date/Time Value Ref Range Status   09/11/2024 10:10 AM 0.61 (L) 0.80 - 3.00 x10*3/uL Final   07/30/2024 09:59 AM 0.66 (L) 0.80 - 3.00 x10*3/uL Final   06/28/2024 11:29 AM 1.04 0.80 - 3.00 x10*3/uL Final     Monocytes Absolute   Date/Time Value Ref Range Status   09/11/2024 10:10 AM 0.24 0.05 - 0.80 x10*3/uL Final   07/30/2024 09:59 AM 0.28 0.05 - 0.80 x10*3/uL Final   06/28/2024 11:29 AM 0.34 0.05 - 0.80 x10*3/uL Final     Eosinophils Absolute   Date/Time Value Ref Range Status   09/11/2024 10:10 AM 0.08 0.00 - 0.40 x10*3/uL Final   07/30/2024 09:59 AM 0.13 0.00 - 0.40 x10*3/uL Final   06/28/2024 11:29 AM 0.23 0.00 - 0.40 x10*3/uL Final     Basophils Absolute   Date/Time Value Ref Range Status   09/11/2024 10:10 AM 0.01 0.00 - 0.10 x10*3/uL Final     Comment:     Automated WBC differential has been confirmed by manual smear.   07/30/2024 09:59 AM 0.01 0.00 - 0.10 x10*3/uL Final   06/28/2024 11:29 AM 0.03 0.00 - 0.10 x10*3/uL Final       No components found for: \"PT\"  aPTT   Date/Time Value Ref Range Status   05/19/2022 10:56 AM 31 26 - 39 sec Final     Comment:       THE APTT IS NO LONGER USED FOR MONITORING     UNFRACTIONATED HEPARIN THERAPY.    FOR MONITORING HEPARIN THERAPY,     USE THE HEPARIN ASSAY.       Medication Documentation Review Audit       Reviewed by Christina Levine MA (Medical Assistant) on 09/13/24 at 1454      Medication Order Taking? Sig Documenting Provider Last Dose Status "   dupilumab (Dupixent Pen) 300 mg/2 mL injection [id number] Yes Inject under the skin 1 time. A month Historical Provider, MD Taking Active   enzalutamide (Xtandi) 40 mg tablet [id number] Yes Take 4 tablets (160 mg total) by mouth once daily.  Take with or without food at the same time each day. Do not crush, break, or dissolve. Swallow it whole. Gabe Garza MD Taking Active   enzalutamide (Xtandi) 40 mg tablet [id number] Yes Take 4 tablets (160 mg total) by mouth once daily.  Take with or without food at the same time each day. Do not crush, break, or dissolve. Swallow it whole. Gabe Garza MD Taking Active   leuprolide, 6-month, (Lupron Depot, 6 Month,) 45 mg injection [id number] Yes Inject into the muscle every 6 months. Historical Provider, MD Taking Active   Xtandi 40 mg capsule [id number] Yes Take 4 capsules (160 mg total) by mouth once daily. Gabe Garza MD Taking Active                   Assessment/Plan    1) prostate cancer  -here for interval followup  -continues to take Xtandi 160 mg, dailly  -last lupron 6 month depot injection received 5/21/2024  ; next lupron dose due 11/2024  -PSMA PET done on 5/10/2024 reviewed-no abnormal focal Ga68 PSMA uptake in neck and chest; no abnormal Ga68 PSMA uptake within abdominal lymph nodes; mild heterogeneous Ga68 uptake seen in prostate gland with SUV of 1.9; no abnormal Ga68 PSMA uptake is seen within periprostatic and pelvic lymph nodes; there is markedly intense gallium 68 PSMA uptake in L5-- previously noted foci on 2021 bone scan in bilateral acetabulae, right ischium and sacrum are not evidence of current PET  -as he has such limited new disease, I don't think we need to radically alter his treatment (such as switch to chemo with docetaxel)  -Advised patient to continue Xtandi without change for now  -will refer to radiation oncology to consider treatment of L5 metastasis  -per Dr Smith, patient's insurance initially rejected requested for SBRT,  however, Dr Smith was able to appeal the decision, and so SBRT is now approved--patient is awaiting start of SBRT (2 fractions planned)  -completed SBRT (7/3 + 7/5)  -labs done on 9/11/2024 included CBC, COMP, PSA  -results reviewed--wbc 3.0, hgb 13.8, plt 129,000, ANC 2000, creatinine 1.04, alk phos 44, AST 21, ALT 11, PSA 1.47  -advised Bill to continue with xtandi  -will see him again on 11/5 when he is due for next lupron     2) eczema  -on dupixent     3) left leg DVT  -on eliquis  -as he now has been confirmed to have prostate cancer, the cancer was most likely the provoking factor the DVT, as 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     4) bone metastases  -secondary to prostate cancer  -as androgen deprivation therapy is expected to diminish bone density over time, will consider addition of either bisphosphonate (zometa) or RANKL inhibitor (xgeva) to help build back bone density and to minimize risk for pathologic fracture  -he recently fell and broke his right elbow --> injured/twisted his left hip in the process  -DEXA was booked for 1/10/2024 but he cancelled appointment because he caught Covid  -DEXA done on 3/8/2024 consistent with osteoporosis     Problem List Items Addressed This Visit             ICD-10-CM    Prostate cancer metastatic to bone (Multi) - Primary C61, C79.51    Relevant Orders    Clinic Appointment Request Chemo Follow Up; GABE GARZA; [facility]    CBC and Auto Differential    Comprehensive metabolic panel    Prostate Specific Antigen            Gabe Garza MD

## 2024-09-14 ASSESSMENT — ENCOUNTER SYMPTOMS
NEUROLOGICAL NEGATIVE: 1
HEMATOLOGIC/LYMPHATIC NEGATIVE: 1
CARDIOVASCULAR NEGATIVE: 1
CONSTITUTIONAL NEGATIVE: 1
MUSCULOSKELETAL NEGATIVE: 1
EYES NEGATIVE: 1
RESPIRATORY NEGATIVE: 1
PSYCHIATRIC NEGATIVE: 1
ENDOCRINE NEGATIVE: 1

## 2024-11-01 ENCOUNTER — LAB (OUTPATIENT)
Dept: LAB | Facility: CLINIC | Age: 74
End: 2024-11-01
Payer: MEDICARE

## 2024-11-01 DIAGNOSIS — C61 PROSTATE CANCER METASTATIC TO BONE (MULTI): ICD-10-CM

## 2024-11-01 DIAGNOSIS — C79.51 PROSTATE CANCER METASTATIC TO BONE (MULTI): ICD-10-CM

## 2024-11-01 LAB
ALBUMIN SERPL BCP-MCNC: 4 G/DL (ref 3.4–5)
ALP SERPL-CCNC: 47 U/L (ref 33–136)
ALT SERPL W P-5'-P-CCNC: 9 U/L (ref 10–52)
ANION GAP SERPL CALC-SCNC: 9 MMOL/L (ref 10–20)
AST SERPL W P-5'-P-CCNC: 14 U/L (ref 9–39)
BASOPHILS # BLD AUTO: 0.02 X10*3/UL (ref 0–0.1)
BASOPHILS NFR BLD AUTO: 0.6 %
BILIRUB SERPL-MCNC: 0.5 MG/DL (ref 0–1.2)
BUN SERPL-MCNC: 20 MG/DL (ref 6–23)
CALCIUM SERPL-MCNC: 9.7 MG/DL (ref 8.6–10.3)
CHLORIDE SERPL-SCNC: 104 MMOL/L (ref 98–107)
CO2 SERPL-SCNC: 32 MMOL/L (ref 21–32)
CREAT SERPL-MCNC: 1.04 MG/DL (ref 0.5–1.3)
EGFRCR SERPLBLD CKD-EPI 2021: 76 ML/MIN/1.73M*2
EOSINOPHIL # BLD AUTO: 0.1 X10*3/UL (ref 0–0.4)
EOSINOPHIL NFR BLD AUTO: 3.2 %
ERYTHROCYTE [DISTWIDTH] IN BLOOD BY AUTOMATED COUNT: 13.5 % (ref 11.5–14.5)
GLUCOSE SERPL-MCNC: 70 MG/DL (ref 74–99)
HCT VFR BLD AUTO: 39.7 % (ref 41–52)
HGB BLD-MCNC: 13.4 G/DL (ref 13.5–17.5)
IMM GRANULOCYTES # BLD AUTO: 0.01 X10*3/UL (ref 0–0.5)
IMM GRANULOCYTES NFR BLD AUTO: 0.3 % (ref 0–0.9)
LYMPHOCYTES # BLD AUTO: 0.7 X10*3/UL (ref 0.8–3)
LYMPHOCYTES NFR BLD AUTO: 22.4 %
MCH RBC QN AUTO: 31.4 PG (ref 26–34)
MCHC RBC AUTO-ENTMCNC: 33.8 G/DL (ref 32–36)
MCV RBC AUTO: 93 FL (ref 80–100)
MONOCYTES # BLD AUTO: 0.35 X10*3/UL (ref 0.05–0.8)
MONOCYTES NFR BLD AUTO: 11.2 %
NEUTROPHILS # BLD AUTO: 1.94 X10*3/UL (ref 1.6–5.5)
NEUTROPHILS NFR BLD AUTO: 62.3 %
PLATELET # BLD AUTO: 209 X10*3/UL (ref 150–450)
POTASSIUM SERPL-SCNC: 4.2 MMOL/L (ref 3.5–5.3)
PROT SERPL-MCNC: 6.5 G/DL (ref 6.4–8.2)
PSA SERPL-MCNC: 0.63 NG/ML
RBC # BLD AUTO: 4.27 X10*6/UL (ref 4.5–5.9)
SODIUM SERPL-SCNC: 141 MMOL/L (ref 136–145)
WBC # BLD AUTO: 3.1 X10*3/UL (ref 4.4–11.3)

## 2024-11-01 PROCEDURE — 85025 COMPLETE CBC W/AUTO DIFF WBC: CPT

## 2024-11-01 PROCEDURE — 80053 COMPREHEN METABOLIC PANEL: CPT

## 2024-11-01 PROCEDURE — 84153 ASSAY OF PSA TOTAL: CPT

## 2024-11-01 PROCEDURE — 36415 COLL VENOUS BLD VENIPUNCTURE: CPT

## 2024-11-05 ENCOUNTER — APPOINTMENT (OUTPATIENT)
Dept: HEMATOLOGY/ONCOLOGY | Facility: CLINIC | Age: 74
End: 2024-11-05
Payer: MEDICARE

## 2024-11-05 ENCOUNTER — OFFICE VISIT (OUTPATIENT)
Dept: HEMATOLOGY/ONCOLOGY | Facility: CLINIC | Age: 74
End: 2024-11-05
Payer: MEDICARE

## 2024-11-05 ENCOUNTER — INFUSION (OUTPATIENT)
Dept: HEMATOLOGY/ONCOLOGY | Facility: CLINIC | Age: 74
End: 2024-11-05
Payer: MEDICARE

## 2024-11-05 VITALS
HEART RATE: 67 BPM | TEMPERATURE: 97.2 F | OXYGEN SATURATION: 92 % | BODY MASS INDEX: 27.48 KG/M2 | WEIGHT: 172.84 LBS | SYSTOLIC BLOOD PRESSURE: 121 MMHG | DIASTOLIC BLOOD PRESSURE: 77 MMHG | RESPIRATION RATE: 16 BRPM

## 2024-11-05 DIAGNOSIS — C79.51 PROSTATE CANCER METASTATIC TO BONE (MULTI): Primary | ICD-10-CM

## 2024-11-05 DIAGNOSIS — C79.51 NEOPLASM OF PROSTATE, DISTANT METASTASIS STAGING CATEGORY M1B: METASTASIS TO BONE (MULTI): ICD-10-CM

## 2024-11-05 DIAGNOSIS — L30.9 ECZEMA, UNSPECIFIED TYPE: ICD-10-CM

## 2024-11-05 DIAGNOSIS — I82.532 CHRONIC DEEP VEIN THROMBOSIS (DVT) OF POPLITEAL VEIN OF LEFT LOWER EXTREMITY (MULTI): ICD-10-CM

## 2024-11-05 DIAGNOSIS — C79.51 PROSTATE CANCER METASTATIC TO BONE (MULTI): ICD-10-CM

## 2024-11-05 DIAGNOSIS — C61 NEOPLASM OF PROSTATE, DISTANT METASTASIS STAGING CATEGORY M1B: METASTASIS TO BONE (MULTI): ICD-10-CM

## 2024-11-05 DIAGNOSIS — C61 PROSTATE CANCER METASTATIC TO BONE (MULTI): Primary | ICD-10-CM

## 2024-11-05 DIAGNOSIS — C61 PROSTATE CANCER METASTATIC TO BONE (MULTI): ICD-10-CM

## 2024-11-05 PROCEDURE — 1159F MED LIST DOCD IN RCRD: CPT | Performed by: INTERNAL MEDICINE

## 2024-11-05 PROCEDURE — 96402 CHEMO HORMON ANTINEOPL SQ/IM: CPT

## 2024-11-05 PROCEDURE — 99214 OFFICE O/P EST MOD 30 MIN: CPT | Mod: 25 | Performed by: INTERNAL MEDICINE

## 2024-11-05 PROCEDURE — G2211 COMPLEX E/M VISIT ADD ON: HCPCS | Performed by: INTERNAL MEDICINE

## 2024-11-05 PROCEDURE — 2500000004 HC RX 250 GENERAL PHARMACY W/ HCPCS (ALT 636 FOR OP/ED): Mod: JZ | Performed by: INTERNAL MEDICINE

## 2024-11-05 PROCEDURE — 99214 OFFICE O/P EST MOD 30 MIN: CPT | Performed by: INTERNAL MEDICINE

## 2024-11-05 PROCEDURE — 1125F AMNT PAIN NOTED PAIN PRSNT: CPT | Performed by: INTERNAL MEDICINE

## 2024-11-05 RX ORDER — ALBUTEROL SULFATE 0.83 MG/ML
3 SOLUTION RESPIRATORY (INHALATION) AS NEEDED
OUTPATIENT
Start: 2025-04-22

## 2024-11-05 RX ORDER — ALBUTEROL SULFATE 0.83 MG/ML
3 SOLUTION RESPIRATORY (INHALATION) AS NEEDED
Status: DISCONTINUED | OUTPATIENT
Start: 2024-11-05 | End: 2024-11-05 | Stop reason: HOSPADM

## 2024-11-05 RX ORDER — FAMOTIDINE 10 MG/ML
20 INJECTION INTRAVENOUS ONCE AS NEEDED
Status: DISCONTINUED | OUTPATIENT
Start: 2024-11-05 | End: 2024-11-05 | Stop reason: HOSPADM

## 2024-11-05 RX ORDER — FAMOTIDINE 10 MG/ML
20 INJECTION INTRAVENOUS ONCE AS NEEDED
OUTPATIENT
Start: 2025-04-22

## 2024-11-05 RX ORDER — EPINEPHRINE 0.3 MG/.3ML
0.3 INJECTION SUBCUTANEOUS EVERY 5 MIN PRN
OUTPATIENT
Start: 2025-04-22

## 2024-11-05 RX ORDER — DIPHENHYDRAMINE HYDROCHLORIDE 50 MG/ML
50 INJECTION INTRAMUSCULAR; INTRAVENOUS AS NEEDED
Status: DISCONTINUED | OUTPATIENT
Start: 2024-11-05 | End: 2024-11-05 | Stop reason: HOSPADM

## 2024-11-05 RX ORDER — EPINEPHRINE 0.3 MG/.3ML
0.3 INJECTION SUBCUTANEOUS EVERY 5 MIN PRN
Status: DISCONTINUED | OUTPATIENT
Start: 2024-11-05 | End: 2024-11-05 | Stop reason: HOSPADM

## 2024-11-05 RX ORDER — DIPHENHYDRAMINE HYDROCHLORIDE 50 MG/ML
50 INJECTION INTRAMUSCULAR; INTRAVENOUS AS NEEDED
OUTPATIENT
Start: 2025-04-22

## 2024-11-05 ASSESSMENT — ENCOUNTER SYMPTOMS
CONSTITUTIONAL NEGATIVE: 1
NEUROLOGICAL NEGATIVE: 1
ENDOCRINE NEGATIVE: 1
RESPIRATORY NEGATIVE: 1
HEMATOLOGIC/LYMPHATIC NEGATIVE: 1
MUSCULOSKELETAL NEGATIVE: 1
PSYCHIATRIC NEGATIVE: 1
EYES NEGATIVE: 1
GASTROINTESTINAL NEGATIVE: 1
CARDIOVASCULAR NEGATIVE: 1

## 2024-11-05 ASSESSMENT — PAIN SCALES - GENERAL: PAINLEVEL_OUTOF10: 2

## 2024-11-05 NOTE — PROGRESS NOTES
Patient ID: Jose George is a 74 y.o. male.  Referring Physician: Gabe Garza MD  [address]  [place] [address]  Primary Care Provider: Jatin Soriano DO  Visit Type: Follow Up      Subjective    HPI I received a letter stating that my patient assistance program will  at the end of this year    Review of Systems   Constitutional: Negative.    HENT:  Negative.     Eyes: Negative.    Respiratory: Negative.     Cardiovascular: Negative.    Gastrointestinal: Negative.    Endocrine: Negative.    Genitourinary: Negative.     Musculoskeletal: Negative.    Skin: Negative.    Neurological: Negative.    Hematological: Negative.    Psychiatric/Behavioral: Negative.          Objective   BSA: 1.92 meters squared  /77 (BP Location: Right arm)   Pulse 67   Temp 36.2 °C (97.2 °F) (Temporal)   Resp 16   Wt 78.4 kg (172 lb 13.5 oz)   SpO2 92%   BMI 27.48 kg/m²      has a past medical history of Allergic contact dermatitis, unspecified cause, Contact with and (suspected) exposure to covid-19 (2020), Localized edema (2021), Nodular prostate without lower urinary tract symptoms (2021), Olecranon bursitis, left elbow (2021), Personal history of malignant neoplasm of prostate (2021), Personal history of other diseases of the circulatory system, Personal history of other diseases of the musculoskeletal system and connective tissue, Personal history of other diseases of urinary system (2021), Personal history of other specified conditions (2021), and Sprain of unspecified ligament of left ankle, initial encounter (2021).   has a past surgical history that includes Other surgical history (2020) and Other surgical history (2020).  Family History   Problem Relation Name Age of Onset    Diabetes Mother      Hypertension Mother      Heart attack Mother      Obesity Mother      Other (cardiac disorder) Mother      Diabetes Father       Hypertension Father      Other (cardiac disorder) Father      Lung disease Father      Obesity Father       Oncology History   Neoplasm of prostate, distant metastasis staging category M1b: metastasis to bone (Multi)   10/18/2023 Initial Diagnosis    Neoplasm of prostate, distant metastasis staging category M1b: metastasis to bone (CMS/HCC)     2/21/2024 -  Chemotherapy    Enzalutamide, 84 Day Cycles     Prostate cancer metastatic to bone (Multi)   12/2/2023 Initial Diagnosis    Prostate cancer metastatic to bone (CMS/HCC)     2/21/2024 -  Chemotherapy    Enzalutamide, 84 Day Cycles         Jose George  reports that he has never smoked. He has never used smokeless tobacco.  He  reports that he does not currently use alcohol.  He  reports that he does not currently use drugs.    Physical Exam  Vitals reviewed.   Constitutional:       Appearance: Normal appearance.   HENT:      Head: Normocephalic.      Mouth/Throat:      Mouth: Mucous membranes are moist.   Eyes:      Extraocular Movements: Extraocular movements intact.      Pupils: Pupils are equal, round, and reactive to light.   Cardiovascular:      Rate and Rhythm: Normal rate and regular rhythm.      Pulses: Normal pulses.      Heart sounds: Normal heart sounds.   Pulmonary:      Effort: Pulmonary effort is normal.      Breath sounds: Normal breath sounds.   Abdominal:      General: Bowel sounds are normal.      Palpations: Abdomen is soft.   Musculoskeletal:         General: Normal range of motion.      Cervical back: Normal range of motion and neck supple.   Skin:     General: Skin is warm.   Neurological:      General: No focal deficit present.      Mental Status: He is alert and oriented to person, place, and time.   Psychiatric:         Mood and Affect: Mood normal.         Behavior: Behavior normal.         WBC   Date/Time Value Ref Range Status   11/01/2024 09:44 AM 3.1 (L) 4.4 - 11.3 x10*3/uL Final   09/11/2024 10:10 AM 3.0 (L) 4.4 - 11.3 x10*3/uL  Final   07/30/2024 09:59 AM 3.0 (L) 4.4 - 11.3 x10*3/uL Final     nRBC   Date Value Ref Range Status   09/11/2024   Final     Comment:     Not Measured   02/01/2024 0.0 0.0 - 0.0 /100 WBCs Final   01/03/2023 0.0 0.0 - 0.0 /100 WBC Final   08/29/2022 0.0 0.0 - 0.0 /100 WBC Final   07/14/2022 0.0 0.0 - 0.0 /100 WBC Final     RBC   Date Value Ref Range Status   11/01/2024 4.27 (L) 4.50 - 5.90 x10*6/uL Final   09/11/2024 4.46 (L) 4.50 - 5.90 x10*6/uL Final   07/30/2024 4.28 (L) 4.50 - 5.90 x10*6/uL Final     Hemoglobin   Date Value Ref Range Status   11/01/2024 13.4 (L) 13.5 - 17.5 g/dL Final   09/11/2024 13.8 13.5 - 17.5 g/dL Final   07/30/2024 13.2 (L) 13.5 - 17.5 g/dL Final     Hematocrit   Date Value Ref Range Status   11/01/2024 39.7 (L) 41.0 - 52.0 % Final   09/11/2024 41.7 41.0 - 52.0 % Final   07/30/2024 39.9 (L) 41.0 - 52.0 % Final     MCV   Date/Time Value Ref Range Status   11/01/2024 09:44 AM 93 80 - 100 fL Final   09/11/2024 10:10 AM 94 80 - 100 fL Final   07/30/2024 09:59 AM 93 80 - 100 fL Final     MCH   Date/Time Value Ref Range Status   11/01/2024 09:44 AM 31.4 26.0 - 34.0 pg Final   09/11/2024 10:10 AM 30.9 26.0 - 34.0 pg Final   07/30/2024 09:59 AM 30.8 26.0 - 34.0 pg Final     MCHC   Date/Time Value Ref Range Status   11/01/2024 09:44 AM 33.8 32.0 - 36.0 g/dL Final   09/11/2024 10:10 AM 33.1 32.0 - 36.0 g/dL Final   07/30/2024 09:59 AM 33.1 32.0 - 36.0 g/dL Final     RDW   Date/Time Value Ref Range Status   11/01/2024 09:44 AM 13.5 11.5 - 14.5 % Final   09/11/2024 10:10 AM 13.6 11.5 - 14.5 % Final   07/30/2024 09:59 AM 13.4 11.5 - 14.5 % Final     Platelets   Date/Time Value Ref Range Status   11/01/2024 09:44  150 - 450 x10*3/uL Final   09/11/2024 10:10  (L) 150 - 450 x10*3/uL Final   07/30/2024 09:59  150 - 450 x10*3/uL Final     MPV   Date/Time Value Ref Range Status   10/19/2023 10:08 AM 9.8 7.5 - 11.5 fL Final     Neutrophils %   Date/Time Value Ref Range Status   11/01/2024  09:44 AM 62.3 40.0 - 80.0 % Final   09/11/2024 10:10 AM 67.9 40.0 - 80.0 % Final   07/30/2024 09:59 AM 64.1 40.0 - 80.0 % Final     Immature Granulocytes %, Automated   Date/Time Value Ref Range Status   11/01/2024 09:44 AM 0.3 0.0 - 0.9 % Final     Comment:     Immature Granulocyte Count (IG) includes promyelocytes, myelocytes and metamyelocytes but does not include bands. Percent differential counts (%) should be interpreted in the context of the absolute cell counts (cells/UL).   09/11/2024 10:10 AM 0.3 0.0 - 0.9 % Final     Comment:     Immature Granulocyte Count (IG) includes promyelocytes, myelocytes and metamyelocytes but does not include bands. Percent differential counts (%) should be interpreted in the context of the absolute cell counts (cells/UL).   07/30/2024 09:59 AM 0.3 0.0 - 0.9 % Final     Comment:     Immature Granulocyte Count (IG) includes promyelocytes, myelocytes and metamyelocytes but does not include bands. Percent differential counts (%) should be interpreted in the context of the absolute cell counts (cells/UL).     Lymphocytes %   Date/Time Value Ref Range Status   11/01/2024 09:44 AM 22.4 13.0 - 44.0 % Final   09/11/2024 10:10 AM 20.7 13.0 - 44.0 % Final   07/30/2024 09:59 AM 21.8 13.0 - 44.0 % Final     Monocytes %   Date/Time Value Ref Range Status   11/01/2024 09:44 AM 11.2 2.0 - 10.0 % Final   09/11/2024 10:10 AM 8.1 2.0 - 10.0 % Final   07/30/2024 09:59 AM 9.2 2.0 - 10.0 % Final     Eosinophils %   Date/Time Value Ref Range Status   11/01/2024 09:44 AM 3.2 0.0 - 6.0 % Final   09/11/2024 10:10 AM 2.7 0.0 - 6.0 % Final   07/30/2024 09:59 AM 4.3 0.0 - 6.0 % Final     Basophils %   Date/Time Value Ref Range Status   11/01/2024 09:44 AM 0.6 0.0 - 2.0 % Final   09/11/2024 10:10 AM 0.3 0.0 - 2.0 % Final   07/30/2024 09:59 AM 0.3 0.0 - 2.0 % Final     Neutrophils Absolute   Date/Time Value Ref Range Status   11/01/2024 09:44 AM 1.94 1.60 - 5.50 x10*3/uL Final     Comment:     Percent  "differential counts (%) should be interpreted in the context of the absolute cell counts (cells/uL).   09/11/2024 10:10 AM 2.00 1.60 - 5.50 x10*3/uL Final     Comment:     Percent differential counts (%) should be interpreted in the context of the absolute cell counts (cells/uL).   07/30/2024 09:59 AM 1.94 1.60 - 5.50 x10*3/uL Final     Comment:     Percent differential counts (%) should be interpreted in the context of the absolute cell counts (cells/uL).     Immature Granulocytes Absolute, Automated   Date/Time Value Ref Range Status   11/01/2024 09:44 AM 0.01 0.00 - 0.50 x10*3/uL Final   09/11/2024 10:10 AM 0.01 0.00 - 0.50 x10*3/uL Final   07/30/2024 09:59 AM 0.01 0.00 - 0.50 x10*3/uL Final     Lymphocytes Absolute   Date/Time Value Ref Range Status   11/01/2024 09:44 AM 0.70 (L) 0.80 - 3.00 x10*3/uL Final   09/11/2024 10:10 AM 0.61 (L) 0.80 - 3.00 x10*3/uL Final   07/30/2024 09:59 AM 0.66 (L) 0.80 - 3.00 x10*3/uL Final     Monocytes Absolute   Date/Time Value Ref Range Status   11/01/2024 09:44 AM 0.35 0.05 - 0.80 x10*3/uL Final   09/11/2024 10:10 AM 0.24 0.05 - 0.80 x10*3/uL Final   07/30/2024 09:59 AM 0.28 0.05 - 0.80 x10*3/uL Final     Eosinophils Absolute   Date/Time Value Ref Range Status   11/01/2024 09:44 AM 0.10 0.00 - 0.40 x10*3/uL Final   09/11/2024 10:10 AM 0.08 0.00 - 0.40 x10*3/uL Final   07/30/2024 09:59 AM 0.13 0.00 - 0.40 x10*3/uL Final     Basophils Absolute   Date/Time Value Ref Range Status   11/01/2024 09:44 AM 0.02 0.00 - 0.10 x10*3/uL Final   09/11/2024 10:10 AM 0.01 0.00 - 0.10 x10*3/uL Final     Comment:     Automated WBC differential has been confirmed by manual smear.   07/30/2024 09:59 AM 0.01 0.00 - 0.10 x10*3/uL Final       No components found for: \"PT\"  aPTT   Date/Time Value Ref Range Status   05/19/2022 10:56 AM 31 26 - 39 sec Final     Comment:       THE APTT IS NO LONGER USED FOR MONITORING     UNFRACTIONATED HEPARIN THERAPY.    FOR MONITORING HEPARIN THERAPY,     USE THE " HEPARIN ASSAY.       Medication Documentation Review Audit       Reviewed by Vee Livingston MA (Medical Assistant) on 11/05/24 at 1132      Medication Order Taking? Sig Documenting Provider Last Dose Status   dupilumab (Dupixent Pen) 300 mg/2 mL injection [id number] Yes Inject under the skin 1 time. A month Historical Provider, MD Taking Active   enzalutamide (Xtandi) 40 mg tablet [id number] No Take 4 tablets (160 mg total) by mouth once daily.  Take with or without food at the same time each day. Do not crush, break, or dissolve. Swallow it whole.   Patient not taking: Reported on 11/5/2024    Gabe Garza MD Taking Active   enzalutamide (Xtandi) 40 mg tablet [id number] Yes Take 4 tablets (160 mg total) by mouth once daily.  Take with or without food at the same time each day. Do not crush, break, or dissolve. Swallow it whole. Gabe Garza MD Taking Active   leuprolide, 6-month, (Lupron Depot, 6 Month,) 45 mg injection [id number] Yes Inject into the muscle every 6 months. Historical Provider, MD Taking Active   Xtandi 40 mg capsule [id number] No Take 4 capsules (160 mg total) by mouth once daily.   Patient not taking: Reported on 11/5/2024    Gabe Garza MD Taking Active                   Assessment/Plan    1) prostate cancer  -here for interval followup  -continues to take Xtandi 160 mg, dailly  -last lupron 6 month depot injection received 5/21/2024  ; next lupron dose due 11/2024  -PSMA PET done on 5/10/2024 reviewed-no abnormal focal Ga68 PSMA uptake in neck and chest; no abnormal Ga68 PSMA uptake within abdominal lymph nodes; mild heterogeneous Ga68 uptake seen in prostate gland with SUV of 1.9; no abnormal Ga68 PSMA uptake is seen within periprostatic and pelvic lymph nodes; there is markedly intense gallium 68 PSMA uptake in L5-- previously noted foci on 2021 bone scan in bilateral acetabulae, right ischium and sacrum are not evidence of current PET  -as he has such limited new disease, I don't think  we need to radically alter his treatment (such as switch to chemo with docetaxel)  -Advised patient to continue Xtandi without change for now  -will refer to radiation oncology to consider treatment of L5 metastasis  -per Dr Smith, patient's insurance initially rejected requested for SBRT, however, Dr Smith was able to appeal the decision, and so SBRT is now approved--patient is awaiting start of SBRT (2 fractions planned)  -completed SBRT (7/3 + 7/5)  -continues on xtandi, however, he received a letter from [employer] stating that he will no longer be eligible for patient assistance program after 12/31/2024 (he is over-income)  -labs done on 11/1/2024 included CBC, COMP, PSA  -results reviewed--wbc 3.1, hgb 13.4, plt 209,000, ANC 1940, creatinine 1.04, alk phos 47, AST 14, ALT 9, PSA 0.63  -advised Bill to continue with xtandi  -he is also due for 6 month lupron depot today  -benefits, risks, potential morbidity related to Lupron were reviewed with Jose and he provided informed consent to proceed  -he went on to receive lupron 45 mg intramuscular     2) eczema  -on dupixent     3) left leg DVT  -on eliquis  -as he now has been confirmed to have prostate cancer, the cancer was most likely the provoking factor the DVT, as 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     4) bone metastases  -secondary to prostate cancer  -as androgen deprivation therapy is expected to diminish bone density over time, will consider addition of either bisphosphonate (zometa) or RANKL inhibitor (xgeva) to help build back bone density and to minimize risk for pathologic fracture  -he recently fell and broke his right elbow --> injured/twisted his left hip in the process  -DEXA was booked for 1/10/2024 but he cancelled appointment because he caught Covid  -DEXA done on 3/8/2024 consistent with osteoporosis     Problem List Items Addressed This Visit             ICD-10-CM    Prostate cancer metastatic to bone (Multi) C61, C79.51     Relevant Orders    Clinic Appointment Request Follow Up; [facility]; [facility]    CBC and Auto Differential    Comprehensive metabolic panel    Prostate Specific Antigen            Gabe Garza MD

## 2024-12-26 ENCOUNTER — LAB (OUTPATIENT)
Dept: LAB | Facility: CLINIC | Age: 74
End: 2024-12-26
Payer: MEDICARE

## 2024-12-26 DIAGNOSIS — C61 PROSTATE CANCER METASTATIC TO BONE (MULTI): ICD-10-CM

## 2024-12-26 DIAGNOSIS — C79.51 PROSTATE CANCER METASTATIC TO BONE (MULTI): ICD-10-CM

## 2024-12-26 LAB
ALBUMIN SERPL BCP-MCNC: 3.9 G/DL (ref 3.4–5)
ALP SERPL-CCNC: 53 U/L (ref 33–136)
ALT SERPL W P-5'-P-CCNC: 11 U/L (ref 10–52)
ANION GAP SERPL CALC-SCNC: 10 MMOL/L (ref 10–20)
AST SERPL W P-5'-P-CCNC: 14 U/L (ref 9–39)
BASOPHILS # BLD AUTO: 0.02 X10*3/UL (ref 0–0.1)
BASOPHILS NFR BLD AUTO: 0.5 %
BILIRUB SERPL-MCNC: 0.4 MG/DL (ref 0–1.2)
BUN SERPL-MCNC: 22 MG/DL (ref 6–23)
CALCIUM SERPL-MCNC: 9.1 MG/DL (ref 8.6–10.3)
CHLORIDE SERPL-SCNC: 105 MMOL/L (ref 98–107)
CO2 SERPL-SCNC: 28 MMOL/L (ref 21–32)
CREAT SERPL-MCNC: 1.19 MG/DL (ref 0.5–1.3)
EGFRCR SERPLBLD CKD-EPI 2021: 64 ML/MIN/1.73M*2
EOSINOPHIL # BLD AUTO: 0.21 X10*3/UL (ref 0–0.4)
EOSINOPHIL NFR BLD AUTO: 5.2 %
ERYTHROCYTE [DISTWIDTH] IN BLOOD BY AUTOMATED COUNT: 13.3 % (ref 11.5–14.5)
GLUCOSE SERPL-MCNC: 82 MG/DL (ref 74–99)
HCT VFR BLD AUTO: 39 % (ref 41–52)
HGB BLD-MCNC: 13.2 G/DL (ref 13.5–17.5)
IMM GRANULOCYTES # BLD AUTO: 0.01 X10*3/UL (ref 0–0.5)
IMM GRANULOCYTES NFR BLD AUTO: 0.2 % (ref 0–0.9)
LYMPHOCYTES # BLD AUTO: 0.76 X10*3/UL (ref 0.8–3)
LYMPHOCYTES NFR BLD AUTO: 19 %
MCH RBC QN AUTO: 31.1 PG (ref 26–34)
MCHC RBC AUTO-ENTMCNC: 33.8 G/DL (ref 32–36)
MCV RBC AUTO: 92 FL (ref 80–100)
MONOCYTES # BLD AUTO: 0.4 X10*3/UL (ref 0.05–0.8)
MONOCYTES NFR BLD AUTO: 10 %
NEUTROPHILS # BLD AUTO: 2.61 X10*3/UL (ref 1.6–5.5)
NEUTROPHILS NFR BLD AUTO: 65.1 %
PLATELET # BLD AUTO: 192 X10*3/UL (ref 150–450)
POTASSIUM SERPL-SCNC: 4.4 MMOL/L (ref 3.5–5.3)
PROT SERPL-MCNC: 6.3 G/DL (ref 6.4–8.2)
PSA SERPL-MCNC: 0.54 NG/ML
RBC # BLD AUTO: 4.24 X10*6/UL (ref 4.5–5.9)
SODIUM SERPL-SCNC: 139 MMOL/L (ref 136–145)
WBC # BLD AUTO: 4 X10*3/UL (ref 4.4–11.3)

## 2024-12-26 PROCEDURE — 84403 ASSAY OF TOTAL TESTOSTERONE: CPT

## 2024-12-26 PROCEDURE — 85025 COMPLETE CBC W/AUTO DIFF WBC: CPT

## 2024-12-26 PROCEDURE — 84153 ASSAY OF PSA TOTAL: CPT

## 2024-12-26 PROCEDURE — 36415 COLL VENOUS BLD VENIPUNCTURE: CPT

## 2024-12-26 PROCEDURE — 80053 COMPREHEN METABOLIC PANEL: CPT

## 2024-12-27 NOTE — PROGRESS NOTES
Patient ID: Jose George is a 74 y.o. male.  Diagnosis:  Prostate Cancer   [facility]. [facility]  Primary Care Provider: Jatin Soriano DO  Referring Provider: Gabe Garza MD  [id number] [place] [address]  Visit Type: Follow Up    Date of Service: 01/03/25  Location: [facility]     Patient Care Team:  Jatin Soriano DO as PCP - General (Family Medicine)  Jatin Soriano DO as PCP - O Medicare Advantage PCP  Gabe Garza MD as Consulting Physician (Hematology and Oncology)    Current Therapy: enzalutamide and ADT     ONCOLOGIC HISTORY     No matching staging information was found for the patient.    8/17/2006: PSA 0.9  2/23/2021: urinary frequency and slow flow;   3/5/2021: left leg swelling--doppler showing occlusive deep vein thrombosis --> started on eliquis  3/25/2021: Prostate bx with Dr. Dumont   4/15/2021: started casodex  4/30/2021: CT scan and bone scan with extraprostatic spread of disease involving the mesorectal fascia as well as a mesorectal fat with close proximity to the rectum without obvious involvement of the rectum. Extensive abdominopelvic lymphadenopathy consistent with alva metastasis; acetabula  (right greater than left), right ischium, and the sacrum bone involvement   5/4/2021: ADT started   5/2022: started enzalutamide   5/10/2024: PSMA PET -no abnormal focal Ga68 PSMA uptake in neck and chest; no abnormal Ga68 PSMA uptake within abdominal lymph nodes; mild heterogeneous Ga68 uptake seen in prostate gland with SUV of 1.9; no abnormal Ga68 PSMA uptake is seen within periprostatic and pelvic lymph nodes; there is markedly intense gallium 68 PSMA uptake in L5-- previously noted foci on 2021 bone scan in bilateral acetabulae, right ischium and sacrum are not evidence of current PET  -as he has such limited new disease, I don't think we need to radically alter his treatment (such as switch to chemo with docetaxel)  7/2024: SBRT to  L5    Bone metastases  -secondary to prostate cancer  -as androgen deprivation therapy is expected to diminish bone density over time, will consider addition of either bisphosphonate (zometa) or RANKL inhibitor (xgeva) to help build back bone density and to minimize risk for pathologic fracture  -10/12/2023 fell and broke his right elbow --> injured/twisted his left hip in the process  -DEXA was booked for 1/10/2024 but he cancelled appointment because he caught Covid  -DEXA done on 3/8/2024 consistent with osteoporosis    Oncology History   Neoplasm of prostate, 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 male who presents today for follow up of prostate cancer. Patient of Gabe Garza MD currently on enzalutamide and ADT. Tolerating treatment well with some balance issues and brain fog. He denies pain, urinary symptoms, GI symptoms, denies hot flashes. No issues with taking Xtandi daily. He has eczema being treated with Dupixent. No unexplained weight loss, cut coca cola and lost weight.     Review of Systems   Constitutional:  Negative for appetite change, fatigue, fever and unexpected weight change.   HENT:  Negative.     Eyes: Negative.    Respiratory: Negative.     Cardiovascular: Negative.    Gastrointestinal: Negative.    Endocrine: Negative for hot flashes.   Genitourinary: Negative.  Negative for hematuria.    Musculoskeletal:  Positive for gait problem.   Skin:  Negative for rash  and wound.   Neurological:  Positive for gait problem.   Hematological: Negative.    Psychiatric/Behavioral:  Positive for decreased concentration.        Objective      /87 (BP Location: Right arm, Patient Position: Sitting, BP Cuff Size: Adult)   Pulse 92   Temp 37 °C (98.6 °F) (Temporal)   Resp 16   Wt 76.3 kg (168 lb 3.4 oz)   SpO2 96%   BMI 26.74 kg/m²   BSA: 1.89 meters squared    Wt Readings from Last 5 Encounters:   01/03/25 76.3 kg (168 lb 3.4 oz)   11/05/24 78.4 kg (172 lb 13.5 oz)   09/13/24 80.1 kg (176 lb 9.4 oz)   08/02/24 82.7 kg (182 lb 5.1 oz)   07/03/24 83.4 kg (183 lb 13.8 oz)     Performance Status:  ECOG Score: 1- Restricted in physically strenuous activity.  Carries out light duty.  Karnofsky Score: 90 - Able to carry on normal activity; minor signs or symptoms of disease     PHYSICAL EXAM   Physical Exam  Constitutional:       General: He is not in acute distress.  HENT:      Head: Normocephalic and atraumatic.      Mouth/Throat:      Mouth: Mucous membranes are moist.      Pharynx: Oropharynx is clear.   Eyes:      Pupils: Pupils are equal, round, and reactive to light.   Pulmonary:      Effort: Pulmonary effort is normal.   Musculoskeletal:         General: Normal range of motion.      Cervical back: Normal range of motion.      Right lower leg: No edema.      Left lower leg: No edema.      Comments: cane   Neurological:      General: No focal deficit present.      Mental Status: He is alert and oriented to person, place, and time.      Motor: No weakness.   Psychiatric:         Mood and Affect: Mood normal.         Behavior: Behavior normal.         Thought Content: Thought content normal.         Judgment: Judgment normal.         Allergies  Allergies   Allergen Reactions    Pollen Extracts Headache     Sinus headache, itchy watery eyes      Medications  Current Outpatient Medications   Medication Instructions    dupilumab (Dupixent Pen) 300 mg/2 mL injection Once     enzalutamide (XTANDI) 160 mg, oral, Daily, Take with or without food at the same time each day. Do not crush, break, or dissolve. Swallow it whole.    leuprolide, 6-month, (Lupron Depot, 6 Month,) 45 mg injection Every 6 months        Diagnostic Results   RESULTS     No results found for this or any previous visit (from the past 96 hours).   Latest Reference Range & Units 12/26/24 11:32   GLUCOSE 74 - 99 mg/dL 82   SODIUM 136 - 145 mmol/L 139   POTASSIUM 3.5 - 5.3 mmol/L 4.4   CHLORIDE 98 - 107 mmol/L 105   Bicarbonate 21 - 32 mmol/L 28   Anion Gap 10 - 20 mmol/L 10   Blood Urea Nitrogen 6 - 23 mg/dL 22   Creatinine 0.50 - 1.30 mg/dL 1.19   EGFR >60 mL/min/1.73m*2 64   Calcium 8.6 - 10.3 mg/dL 9.1   Albumin 3.4 - 5.0 g/dL 3.9   Alkaline Phosphatase 33 - 136 U/L 53   ALT 10 - 52 U/L 11   AST 9 - 39 U/L 14   Bilirubin Total 0.0 - 1.2 mg/dL 0.4      Latest Reference Range & Units 12/26/24 11:32   WBC 4.4 - 11.3 x10*3/uL 4.0 (L)   RBC 4.50 - 5.90 x10*6/uL 4.24 (L)   HEMOGLOBIN 13.5 - 17.5 g/dL 13.2 (L)   HEMATOCRIT 41.0 - 52.0 % 39.0 (L)   MCV 80 - 100 fL 92   MCH 26.0 - 34.0 pg 31.1   MCHC 32.0 - 36.0 g/dL 33.8   RED CELL DISTRIBUTION WIDTH 11.5 - 14.5 % 13.3   Platelets 150 - 450 x10*3/uL 192   Neutrophils % 40.0 - 80.0 % 65.1   Immature Granulocytes %, Automated 0.0 - 0.9 % 0.2   Lymphocytes % 13.0 - 44.0 % 19.0   Monocytes % 2.0 - 10.0 % 10.0   Eosinophils % 0.0 - 6.0 % 5.2   Basophils % 0.0 - 2.0 % 0.5   Neutrophils Absolute 1.60 - 5.50 x10*3/uL 2.61   Immature Granulocytes Absolute, Automated 0.00 - 0.50 x10*3/uL 0.01   Lymphocytes Absolute 0.80 - 3.00 x10*3/uL 0.76 (L)   Monocytes Absolute 0.05 - 0.80 x10*3/uL 0.40   Eosinophils Absolute 0.00 - 0.40 x10*3/uL 0.21   Basophils Absolute 0.00 - 0.10 x10*3/uL 0.02   (L): Data is abnormally low  Lab Results   Component Value Date    PSA 0.54 12/26/2024    PSA 0.63 11/01/2024    PSA 1.47 09/11/2024     Lab Results   Component Value Date    TESTOSTERONE <30 (L)  12/26/2024     Recent Imaging     10/25/2024 [facility] for   IMPRESSION:     Three-phase bone scan not consistent with prosthesis loosening in the   right knee.   No evidence of osteoblastic metastatic disease to the bones.   Degenerative changes as above.     5/10/2024 PSMA PET   IMPRESSION:  Mild heterogeneous Ga68 PSMA uptake seen in the prostate gland.  Finding is nonspecific.      Focus of markedly intense killing 68 PSMA uptake in the region of L5.  Finding would be consistent an osseous metastatic focus.      It should be noted that the previously noted foci (on prior bone  scan) in the bilateral acetabulae, right ischium and sacrum are not  readily evident on PSMA PET-CT.    3/8/2024 Dexa  IMPRESSION:  DEXA:  According to World Health Organization criteria,  classification is osteoporosis.    Assessment/Plan   ASSESSMENT     Jose George is a 74 y.o. male with high grade prostate cancer (GS 9, iPSA 160) bone and node involvement who presents in follow up on enzalutamide and ADT since 2022. Last PET in May of 2024, he was treated with SBRT to L5 in July.     CBC with mild, stable anemia and lymphocytopenia.   CMP unremarkable.   PSA continues to trend down from 11 to now 0.54 post XRT in July.   T suppressed.   He is tolerating ADT and enzalutamide well with some brain fog.   [facility] is working on co-pay assistance for Xtandi and will reach out.     PLAN     # Prostate Cancer   - Continue enzalutamide 160 mg daily  - ADT every 6 months   - CBC, CMP, PSA every 6 weeks.     # Brain Fog/Balance Issues   - Continue strengthening exercises  - Puzzles and reading    Follow up with me in 6 weeks for tox check, labs prior.     Jose was seen today for follow-up.  Diagnoses and all orders for this visit:  Prostate cancer metastatic to bone (Multi) (Primary)  -     Testosterone; Future  -     Clinic Appointment Request Follow Up; TONNY BLACKBURN; [facility]    Enzalutamide, 84 Day  Cycles  Leuprolide Acetate, Every 24 Weeks  Venous Access Orders    Patient verbalizes understanding of above plan. Time provided for patient's questions. All questions answered to patient's satisfaction in office. Patient instructed to reach out for any new concerning issues at [phone].    Catherine Gamez MSN, APRN, A-GNP-C, AOP  [facility]  Division of Hematology & Medical Oncology   [place] [address]  Phone: [phone]  Available via [employer]ave Chat  [email or web address]

## 2024-12-30 LAB — TESTOST SERPL-MCNC: <30 NG/DL (ref 240–1000)

## 2025-01-03 ENCOUNTER — OFFICE VISIT (OUTPATIENT)
Dept: HEMATOLOGY/ONCOLOGY | Facility: CLINIC | Age: 75
End: 2025-01-03
Payer: MEDICARE

## 2025-01-03 VITALS
DIASTOLIC BLOOD PRESSURE: 87 MMHG | TEMPERATURE: 98.6 F | SYSTOLIC BLOOD PRESSURE: 137 MMHG | OXYGEN SATURATION: 96 % | WEIGHT: 168.21 LBS | RESPIRATION RATE: 16 BRPM | BODY MASS INDEX: 26.74 KG/M2 | HEART RATE: 92 BPM

## 2025-01-03 DIAGNOSIS — C61 PROSTATE CANCER METASTATIC TO BONE (MULTI): Primary | ICD-10-CM

## 2025-01-03 DIAGNOSIS — C79.51 PROSTATE CANCER METASTATIC TO BONE (MULTI): Primary | ICD-10-CM

## 2025-01-03 PROCEDURE — 1159F MED LIST DOCD IN RCRD: CPT

## 2025-01-03 PROCEDURE — 99212 OFFICE O/P EST SF 10 MIN: CPT

## 2025-01-03 PROCEDURE — 1126F AMNT PAIN NOTED NONE PRSNT: CPT

## 2025-01-03 PROCEDURE — 1160F RVW MEDS BY RX/DR IN RCRD: CPT

## 2025-01-03 PROCEDURE — 99202 OFFICE O/P NEW SF 15 MIN: CPT

## 2025-01-03 PROCEDURE — 1123F ACP DISCUSS/DSCN MKR DOCD: CPT

## 2025-01-03 ASSESSMENT — ENCOUNTER SYMPTOMS
DECREASED CONCENTRATION: 1
HOT FLASHES: 0
HEMATURIA: 0
EYES NEGATIVE: 1
FEVER: 0
FATIGUE: 0
CARDIOVASCULAR NEGATIVE: 1
APPETITE CHANGE: 0
WOUND: 0
GASTROINTESTINAL NEGATIVE: 1
RESPIRATORY NEGATIVE: 1
HEMATOLOGIC/LYMPHATIC NEGATIVE: 1
UNEXPECTED WEIGHT CHANGE: 0

## 2025-01-03 ASSESSMENT — PAIN SCALES - GENERAL: PAINLEVEL_OUTOF10: 0-NO PAIN

## 2025-01-13 ENCOUNTER — OFFICE VISIT (OUTPATIENT)
Dept: PRIMARY CARE | Facility: CLINIC | Age: 75
End: 2025-01-13
Payer: MEDICARE

## 2025-01-13 VITALS
DIASTOLIC BLOOD PRESSURE: 80 MMHG | TEMPERATURE: 96.8 F | WEIGHT: 167 LBS | BODY MASS INDEX: 26.55 KG/M2 | SYSTOLIC BLOOD PRESSURE: 120 MMHG

## 2025-01-13 DIAGNOSIS — H10.13 ALLERGIC CONJUNCTIVITIS OF BOTH EYES: Primary | ICD-10-CM

## 2025-01-13 PROCEDURE — 1160F RVW MEDS BY RX/DR IN RCRD: CPT | Performed by: FAMILY MEDICINE

## 2025-01-13 PROCEDURE — 1123F ACP DISCUSS/DSCN MKR DOCD: CPT | Performed by: FAMILY MEDICINE

## 2025-01-13 PROCEDURE — 1036F TOBACCO NON-USER: CPT | Performed by: FAMILY MEDICINE

## 2025-01-13 PROCEDURE — 1159F MED LIST DOCD IN RCRD: CPT | Performed by: FAMILY MEDICINE

## 2025-01-13 PROCEDURE — 99213 OFFICE O/P EST LOW 20 MIN: CPT | Performed by: FAMILY MEDICINE

## 2025-01-13 RX ORDER — KETOTIFEN FUMARATE 0.35 MG/ML
1 SOLUTION/ DROPS OPHTHALMIC 2 TIMES DAILY
Qty: 5 ML | Refills: 0 | Status: SHIPPED | OUTPATIENT
Start: 2025-01-13

## 2025-01-13 ASSESSMENT — PATIENT HEALTH QUESTIONNAIRE - PHQ9
SUM OF ALL RESPONSES TO PHQ9 QUESTIONS 1 AND 2: 0
1. LITTLE INTEREST OR PLEASURE IN DOING THINGS: NOT AT ALL
2. FEELING DOWN, DEPRESSED OR HOPELESS: NOT AT ALL

## 2025-01-13 NOTE — PATIENT INSTRUCTIONS
I think that this is from allergies.  I will order ketotifen drops.  I recommend using Claritin.  Return in one week if this persists.

## 2025-01-13 NOTE — PROGRESS NOTES
Subjective   Patient ID: [id number]     Jose George is a 74 y.o. male who presents for watery eyes, itchy eyes, and red eyes.  HPI  He complains of watery, itchy red eyes.  This started mid last week.  No vision problems.      No discharge other than tears.      No sore throat, cough, fever.  No headache.      No eye pain.  No injury to the eye.  No contact lens use.     Occasional sneezing.      Objective     /80 (BP Location: Left arm, Patient Position: Sitting)   Temp 36 °C (96.8 °F) (Skin)   Wt 75.8 kg (167 lb)   BMI 26.55 kg/m²      Physical Exam  HENT:      Nose: No congestion or rhinorrhea.      Mouth/Throat:      Pharynx: No oropharyngeal exudate or posterior oropharyngeal erythema.   Eyes:      Extraocular Movements: Extraocular movements intact.      Pupils: Pupils are equal, round, and reactive to light.      Comments: Watery eyes.  Conjunctiva mildly red.  No purulent discharge.    Neurological:      Mental Status: He is alert.         Assessment/Plan   Problem List Items Addressed This Visit    None  Visit Diagnoses       Allergic conjunctivitis of both eyes    -  Primary    Relevant Medications    ketotifen (Zaditor) 0.025 % (0.035 %) ophthalmic solution          I think that this is from allergies.  I will order ketotifen drops.  I recommend using Claritin.  Return in one week if this persists.    Jatin Soriano, DO

## 2025-01-17 ENCOUNTER — SPECIALTY PHARMACY (OUTPATIENT)
Dept: PHARMACY | Facility: CLINIC | Age: 75
End: 2025-01-17

## 2025-01-17 DIAGNOSIS — C61 PROSTATE CANCER METASTATIC TO BONE (MULTI): ICD-10-CM

## 2025-01-17 DIAGNOSIS — C61 NEOPLASM OF PROSTATE, DISTANT METASTASIS STAGING CATEGORY M1B: METASTASIS TO BONE (MULTI): ICD-10-CM

## 2025-01-17 DIAGNOSIS — C79.51 PROSTATE CANCER METASTATIC TO BONE (MULTI): ICD-10-CM

## 2025-01-17 DIAGNOSIS — C79.51 NEOPLASM OF PROSTATE, DISTANT METASTASIS STAGING CATEGORY M1B: METASTASIS TO BONE (MULTI): ICD-10-CM

## 2025-01-17 DIAGNOSIS — C61 NEOPLASM OF PROSTATE, DISTANT METASTASIS STAGING CATEGORY M1B: METASTASIS TO BONE (MULTI): Primary | ICD-10-CM

## 2025-01-17 DIAGNOSIS — C79.51 NEOPLASM OF PROSTATE, DISTANT METASTASIS STAGING CATEGORY M1B: METASTASIS TO BONE (MULTI): Primary | ICD-10-CM

## 2025-01-17 PROCEDURE — RXMED WILLOW AMBULATORY MEDICATION CHARGE

## 2025-01-17 NOTE — TELEPHONE ENCOUNTER
Jose George is a 74 y.o. 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  Jose is currently being treated with enzalutamide.    Labs from 12/26/24 WBC 4.0, ANC 2.61, H/H 13.2/39.0, PLTs 192; SCr 1.19, LFTs wnl; testosterone < 30, PSA 0.54.  Continue current therapy.    Per collaborative practice agreement with Dr. Garza, on 1/17/25 I refilled enzalutamide 160 mg (4 x 40 mg) PO once daily, 30 day cycle.  Qty # 120 with 5 refills.      Going forward, [facility] will be dispensing Luiss enzalutamide, as he now has insurance and no longer qualifies for the [facility].  Accordingly, [facility]    Next FUV is 2/18/25.      Carroll Scott, [facility], MS, BCOP  Clinical Pharmacist Specialist - Ambulatory Oncology    ]

## 2025-01-20 ENCOUNTER — PHARMACY VISIT (OUTPATIENT)
Dept: PHARMACY | Facility: CLINIC | Age: 75
End: 2025-01-20
Payer: COMMERCIAL

## 2025-02-12 ENCOUNTER — SPECIALTY PHARMACY (OUTPATIENT)
Dept: PHARMACY | Facility: CLINIC | Age: 75
End: 2025-02-12

## 2025-02-12 PROCEDURE — RXMED WILLOW AMBULATORY MEDICATION CHARGE

## 2025-02-13 ENCOUNTER — LAB (OUTPATIENT)
Dept: LAB | Facility: CLINIC | Age: 75
End: 2025-02-13
Payer: MEDICARE

## 2025-02-13 DIAGNOSIS — C79.51 PROSTATE CANCER METASTATIC TO BONE (MULTI): ICD-10-CM

## 2025-02-13 DIAGNOSIS — C61 PROSTATE CANCER METASTATIC TO BONE (MULTI): ICD-10-CM

## 2025-02-13 LAB
ALBUMIN SERPL BCP-MCNC: 4.1 G/DL (ref 3.4–5)
ALP SERPL-CCNC: 49 U/L (ref 33–136)
ALT SERPL W P-5'-P-CCNC: 10 U/L (ref 10–52)
ANION GAP SERPL CALC-SCNC: 10 MMOL/L (ref 10–20)
AST SERPL W P-5'-P-CCNC: 16 U/L (ref 9–39)
BASOPHILS # BLD AUTO: 0.02 X10*3/UL (ref 0–0.1)
BASOPHILS NFR BLD AUTO: 0.6 %
BILIRUB SERPL-MCNC: 0.5 MG/DL (ref 0–1.2)
BUN SERPL-MCNC: 22 MG/DL (ref 6–23)
CALCIUM SERPL-MCNC: 9.2 MG/DL (ref 8.6–10.3)
CHLORIDE SERPL-SCNC: 103 MMOL/L (ref 98–107)
CO2 SERPL-SCNC: 30 MMOL/L (ref 21–32)
CREAT SERPL-MCNC: 0.95 MG/DL (ref 0.5–1.3)
EGFRCR SERPLBLD CKD-EPI 2021: 85 ML/MIN/1.73M*2
EOSINOPHIL # BLD AUTO: 0.15 X10*3/UL (ref 0–0.4)
EOSINOPHIL NFR BLD AUTO: 4.6 %
ERYTHROCYTE [DISTWIDTH] IN BLOOD BY AUTOMATED COUNT: 13.7 % (ref 11.5–14.5)
GLUCOSE SERPL-MCNC: 49 MG/DL (ref 74–99)
HCT VFR BLD AUTO: 40.4 % (ref 41–52)
HGB BLD-MCNC: 13.4 G/DL (ref 13.5–17.5)
IMM GRANULOCYTES # BLD AUTO: 0.01 X10*3/UL (ref 0–0.5)
IMM GRANULOCYTES NFR BLD AUTO: 0.3 % (ref 0–0.9)
LYMPHOCYTES # BLD AUTO: 0.68 X10*3/UL (ref 0.8–3)
LYMPHOCYTES NFR BLD AUTO: 20.7 %
MCH RBC QN AUTO: 30.9 PG (ref 26–34)
MCHC RBC AUTO-ENTMCNC: 33.2 G/DL (ref 32–36)
MCV RBC AUTO: 93 FL (ref 80–100)
MONOCYTES # BLD AUTO: 0.43 X10*3/UL (ref 0.05–0.8)
MONOCYTES NFR BLD AUTO: 13.1 %
NEUTROPHILS # BLD AUTO: 1.99 X10*3/UL (ref 1.6–5.5)
NEUTROPHILS NFR BLD AUTO: 60.7 %
PLATELET # BLD AUTO: 198 X10*3/UL (ref 150–450)
POTASSIUM SERPL-SCNC: 3.8 MMOL/L (ref 3.5–5.3)
PROT SERPL-MCNC: 6.4 G/DL (ref 6.4–8.2)
PSA SERPL-MCNC: 0.66 NG/ML
RBC # BLD AUTO: 4.33 X10*6/UL (ref 4.5–5.9)
SODIUM SERPL-SCNC: 139 MMOL/L (ref 136–145)
WBC # BLD AUTO: 3.3 X10*3/UL (ref 4.4–11.3)

## 2025-02-13 PROCEDURE — 84153 ASSAY OF PSA TOTAL: CPT

## 2025-02-13 PROCEDURE — 36415 COLL VENOUS BLD VENIPUNCTURE: CPT

## 2025-02-13 PROCEDURE — 80053 COMPREHEN METABOLIC PANEL: CPT

## 2025-02-13 PROCEDURE — 85025 COMPLETE CBC W/AUTO DIFF WBC: CPT

## 2025-02-14 ENCOUNTER — PHARMACY VISIT (OUTPATIENT)
Dept: PHARMACY | Facility: CLINIC | Age: 75
End: 2025-02-14
Payer: COMMERCIAL

## 2025-02-17 ASSESSMENT — ENCOUNTER SYMPTOMS
EYES NEGATIVE: 1
APPETITE CHANGE: 0
UNEXPECTED WEIGHT CHANGE: 0
HOT FLASHES: 0
RESPIRATORY NEGATIVE: 1
DECREASED CONCENTRATION: 1
WOUND: 0
HEMATOLOGIC/LYMPHATIC NEGATIVE: 1
FEVER: 0
HEMATURIA: 0
GASTROINTESTINAL NEGATIVE: 1
FATIGUE: 0
CARDIOVASCULAR NEGATIVE: 1

## 2025-02-17 NOTE — PROGRESS NOTES
Patient ID: Jose George is a 74 y.o. male.  Diagnosis:  Prostate Cancer   [facility]: Dr. Garza  Primary Care Provider: Jatin Soriano DO  Referring Provider: LISA Evangelista-CNP  [address] [place] [address]  Visit Type: Follow Up    Date of Service: 02/19/25  Location: [facility]     Patient Care Team:  Jatin Soriano DO as PCP - General (Family Medicine)  Jatin Soriano DO as PCP - M[employer] Medicare Advantage PCP  Gabe Garza MD as Consulting Physician (Hematology and Oncology)    Current Therapy: enzalutamide and ADT     ONCOLOGIC HISTORY     No matching staging information was found for the patient.    8/17/2006: PSA 0.9  2/23/2021: urinary frequency and slow flow;   3/5/2021: left leg swelling--doppler showing occlusive deep vein thrombosis --> started on eliquis  3/25/2021: Prostate bx with Dr. Dumont   4/15/2021: started casodex  4/30/2021: CT scan and bone scan with extraprostatic spread of disease involving the mesorectal fascia as well as a mesorectal fat with close proximity to the rectum without obvious involvement of the rectum. Extensive abdominopelvic lymphadenopathy consistent with alva metastasis; acetabula  (right greater than left), right ischium, and the sacrum bone involvement   5/4/2021: ADT started   5/2022: started enzalutamide   5/10/2024: PSMA PET -no abnormal focal Ga68 PSMA uptake in neck and chest; no abnormal Ga68 PSMA uptake within abdominal lymph nodes; mild heterogeneous Ga68 uptake seen in prostate gland with SUV of 1.9; no abnormal Ga68 PSMA uptake is seen within periprostatic and pelvic lymph nodes; there is markedly intense gallium 68 PSMA uptake in L5-- previously noted foci on 2021 bone scan in bilateral acetabulae, right ischium and sacrum are not evidence of current PET  -as he has such limited new disease, I don't think we need to radically alter his treatment (such as switch to chemo with docetaxel)  7/2024:  SBRT to L5    Bone metastases  -secondary to prostate cancer  -as androgen deprivation therapy is expected to diminish bone density over time, will consider addition of either bisphosphonate (zometa) or RANKL inhibitor (xgeva) to help build back bone density and to minimize risk for pathologic fracture  -10/12/2023 fell and broke his right elbow --> injured/twisted his left hip in the process  -DEXA was booked for 1/10/2024 but he cancelled appointment because he caught Covid  -DEXA done on 3/8/2024 consistent with osteoporosis    Oncology History   Neoplasm of prostate, 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 male who presents today for follow up of prostate cancer. Patient of Catherine Gamez, APR* and Dr. Garza currently on enzalutamide and ADT. Tolerating treatment well with some balance issues/dizziness and brain fog. Fell on ice recently, no injury. Doing strengthening exercises. He denies pain, urinary symptoms, GI symptoms, denies hot flashes. No issues with taking Xtandi daily. He has eczema being treated with Dupixent.    Review of Systems   Constitutional:  Negative for appetite change, fatigue, fever and unexpected weight change.   HENT:  Negative.     Eyes: Negative.    Respiratory: Negative.     Cardiovascular: Negative.    Gastrointestinal: Negative.    Endocrine: Negative for hot flashes.   Genitourinary: Negative.  Negative for hematuria.    Musculoskeletal:  Positive for  gait problem.   Skin:  Negative for rash and wound.   Neurological:  Positive for dizziness and gait problem.   Hematological: Negative.    Psychiatric/Behavioral:  Positive for decreased concentration.        Objective      /76 (BP Location: Right arm, Patient Position: Sitting, BP Cuff Size: Large adult)   Pulse 68   Temp 35.9 °C (96.6 °F) (Temporal)   Resp 16   Wt 77.7 kg (171 lb 4.8 oz)   SpO2 97%   BMI 27.23 kg/m²   BSA: 1.91 meters squared    Wt Readings from Last 5 Encounters:   02/19/25 77.7 kg (171 lb 4.8 oz)   01/13/25 75.8 kg (167 lb)   01/03/25 76.3 kg (168 lb 3.4 oz)   11/05/24 78.4 kg (172 lb 13.5 oz)   09/13/24 80.1 kg (176 lb 9.4 oz)     Performance Status:  ECOG Score: 1- Restricted in physically strenuous activity.  Carries out light duty.  Karnofsky Score: 90 - Able to carry on normal activity; minor signs or symptoms of disease     PHYSICAL EXAM   Physical Exam  Constitutional:       General: He is not in acute distress.  HENT:      Head: Normocephalic and atraumatic.      Mouth/Throat:      Mouth: Mucous membranes are moist.      Pharynx: Oropharynx is clear.   Eyes:      Pupils: Pupils are equal, round, and reactive to light.   Pulmonary:      Effort: Pulmonary effort is normal.   Musculoskeletal:         General: Normal range of motion.      Cervical back: Normal range of motion.      Right lower leg: No edema.      Left lower leg: No edema.      Comments: cane   Neurological:      General: No focal deficit present.      Mental Status: He is alert and oriented to person, place, and time.      Motor: No weakness.   Psychiatric:         Mood and Affect: Mood normal.         Behavior: Behavior normal.         Thought Content: Thought content normal.         Judgment: Judgment normal.         Allergies  Allergies   Allergen Reactions    Pollen Extracts Headache     Sinus headache, itchy watery eyes      Medications  Current Outpatient Medications   Medication Instructions    dupilumab  (Dupixent Pen) 300 mg/2 mL injection Once    enzalutamide (XTANDI) 160 mg, oral, Daily, Take with or without food at the same time each day. Do not crush, break, or dissolve. Swallow it whole.    ketotifen (Zaditor) 0.025 % (0.035 %) ophthalmic solution 1 drop, Both Eyes, 2 times daily    leuprolide, 6-month, (Lupron Depot, 6 Month,) 45 mg injection Every 6 months    Xtandi 160 mg, oral, Daily, Take with or without food at the same time each day. Do not crush, break, or dissolve. Swallow it whole.        Diagnostic Results   RESULTS     No results found for this or any previous visit (from the past 96 hours).   Latest Reference Range & Units 02/13/25 10:42   GLUCOSE 74 - 99 mg/dL 49 (L)   SODIUM 136 - 145 mmol/L 139   POTASSIUM 3.5 - 5.3 mmol/L 3.8   CHLORIDE 98 - 107 mmol/L 103   Bicarbonate 21 - 32 mmol/L 30   Anion Gap 10 - 20 mmol/L 10   Blood Urea Nitrogen 6 - 23 mg/dL 22   Creatinine 0.50 - 1.30 mg/dL 0.95   EGFR >60 mL/min/1.73m*2 85   Calcium 8.6 - 10.3 mg/dL 9.2   Albumin 3.4 - 5.0 g/dL 4.1   Alkaline Phosphatase 33 - 136 U/L 49   ALT 10 - 52 U/L 10   AST 9 - 39 U/L 16   Bilirubin Total 0.0 - 1.2 mg/dL 0.5   (L): Data is abnormally low   Latest Reference Range & Units 02/13/25 10:42   WBC 4.4 - 11.3 x10*3/uL 3.3 (L)   RBC 4.50 - 5.90 x10*6/uL 4.33 (L)   HEMOGLOBIN 13.5 - 17.5 g/dL 13.4 (L)   HEMATOCRIT 41.0 - 52.0 % 40.4 (L)   MCV 80 - 100 fL 93   MCH 26.0 - 34.0 pg 30.9   MCHC 32.0 - 36.0 g/dL 33.2   RED CELL DISTRIBUTION WIDTH 11.5 - 14.5 % 13.7   Platelets 150 - 450 x10*3/uL 198   Neutrophils % 40.0 - 80.0 % 60.7   Immature Granulocytes %, Automated 0.0 - 0.9 % 0.3   Lymphocytes % 13.0 - 44.0 % 20.7   Monocytes % 2.0 - 10.0 % 13.1   Eosinophils % 0.0 - 6.0 % 4.6   Basophils % 0.0 - 2.0 % 0.6   Neutrophils Absolute 1.60 - 5.50 x10*3/uL 1.99   Immature Granulocytes Absolute, Automated 0.00 - 0.50 x10*3/uL 0.01   Lymphocytes Absolute 0.80 - 3.00 x10*3/uL 0.68 (L)   Monocytes Absolute 0.05 - 0.80 x10*3/uL  0.43   Eosinophils Absolute 0.00 - 0.40 x10*3/uL 0.15   Basophils Absolute 0.00 - 0.10 x10*3/uL 0.02   (L): Data is abnormally low  Lab Results   Component Value Date    PSA 0.66 02/13/2025    PSA 0.54 12/26/2024    PSA 0.63 11/01/2024     Lab Results   Component Value Date    TESTOSTERONE <30 (L) 12/26/2024     Recent Imaging     10/25/2024 [facility] Scan at [facility]   IMPRESSION:     Three-phase bone scan not consistent with prosthesis loosening in the   right knee.   No evidence of osteoblastic metastatic disease to the bones.   Degenerative changes as above.     5/10/2024 PSMA PET   IMPRESSION:  Mild heterogeneous Ga68 PSMA uptake seen in the prostate gland.  Finding is nonspecific.      Focus of markedly intense killing 68 PSMA uptake in the region of L5.  Finding would be consistent an osseous metastatic focus.      It should be noted that the previously noted foci (on prior bone  scan) in the bilateral acetabulae, right ischium and sacrum are not  readily evident on PSMA PET-CT.    3/8/2024 Dexa  IMPRESSION:  DEXA:  According to World Health Organization criteria,  classification is osteoporosis.    Assessment/Plan   ASSESSMENT     Jose George is a 74 y.o. male with high grade prostate cancer (GS 9, iPSA 160) bone and node involvement who presents in follow up on enzalutamide and ADT since 2022. Last PET in May of 2024, he was treated with SBRT to L5 in July.     CBC with mild, stable anemia and lymphopenia.   CMP unremarkable.   PSA continues to trend down from 11 to now stable at 0.66 post XRT in July.   Last T suppressed.   He is tolerating ADT and enzalutamide well with some brain fog and balance issues/dizziness.      PLAN     # Prostate Cancer   - Continue enzalutamide 160 mg daily  - ADT every 6 months - due 4/22/2025  - CBC, CMP, PSA every ~ 6-8 weeks.     # Brain Fog/Balance Issues   - Continue strengthening exercises  - Puzzles and reading    Follow up with me in ~8 weeks for tox check and next ADT, labs  prior.     Jose was seen today for follow-up.  Diagnoses and all orders for this visit:  Prostate cancer metastatic to bone (Multi)  -     CBC and Auto Differential; Future  -     Comprehensive Metabolic Panel; Future  -     Prostate Specific Antigen; Future  -     Clinic Appointment Request Follow Up; TONNY BLACKBURN;  HEMATOLOGY ONC      Enzalutamide, 84 Day Cycles  Leuprolide Acetate, Every 24 Weeks  Venous Access Orders    Patient verbalizes understanding of above plan. Time provided for patient's questions. All questions answered to patient's satisfaction in office. Patient instructed to reach out for any new concerning issues at [phone].    Tonny Blackburn MSN, APRN, A-GNP-C, AOCNP  [facility]  Division of Hematology & Medical Oncology   [place] [address]  Phone: [phone]  Available via [email or web address] Chat  [email or web address]

## 2025-02-18 ENCOUNTER — APPOINTMENT (OUTPATIENT)
Dept: HEMATOLOGY/ONCOLOGY | Facility: CLINIC | Age: 75
End: 2025-02-18
Payer: MEDICARE

## 2025-02-19 ENCOUNTER — OFFICE VISIT (OUTPATIENT)
Dept: HEMATOLOGY/ONCOLOGY | Facility: CLINIC | Age: 75
End: 2025-02-19
Payer: MEDICARE

## 2025-02-19 ENCOUNTER — SPECIALTY PHARMACY (OUTPATIENT)
Dept: HEMATOLOGY/ONCOLOGY | Facility: CLINIC | Age: 75
End: 2025-02-19

## 2025-02-19 VITALS
BODY MASS INDEX: 27.23 KG/M2 | OXYGEN SATURATION: 97 % | RESPIRATION RATE: 16 BRPM | WEIGHT: 171.3 LBS | DIASTOLIC BLOOD PRESSURE: 76 MMHG | SYSTOLIC BLOOD PRESSURE: 110 MMHG | HEART RATE: 68 BPM | TEMPERATURE: 96.6 F

## 2025-02-19 DIAGNOSIS — C61 PROSTATE CANCER METASTATIC TO BONE (MULTI): ICD-10-CM

## 2025-02-19 DIAGNOSIS — C79.51 PROSTATE CANCER METASTATIC TO BONE (MULTI): ICD-10-CM

## 2025-02-19 PROCEDURE — 1126F AMNT PAIN NOTED NONE PRSNT: CPT

## 2025-02-19 PROCEDURE — 1160F RVW MEDS BY RX/DR IN RCRD: CPT

## 2025-02-19 PROCEDURE — 99213 OFFICE O/P EST LOW 20 MIN: CPT

## 2025-02-19 PROCEDURE — 1123F ACP DISCUSS/DSCN MKR DOCD: CPT

## 2025-02-19 PROCEDURE — 1159F MED LIST DOCD IN RCRD: CPT

## 2025-02-19 ASSESSMENT — PAIN SCALES - GENERAL: PAINLEVEL_OUTOF10: 0-NO PAIN

## 2025-02-19 ASSESSMENT — ENCOUNTER SYMPTOMS: DIZZINESS: 1

## 2025-03-14 ENCOUNTER — SPECIALTY PHARMACY (OUTPATIENT)
Dept: PHARMACY | Facility: CLINIC | Age: 75
End: 2025-03-14

## 2025-03-14 PROCEDURE — RXMED WILLOW AMBULATORY MEDICATION CHARGE

## 2025-03-18 ENCOUNTER — PHARMACY VISIT (OUTPATIENT)
Dept: PHARMACY | Facility: CLINIC | Age: 75
End: 2025-03-18
Payer: COMMERCIAL

## 2025-03-22 NOTE — PROGRESS NOTES
"[facility]ty Pharmacy Clinical Note  Initial Patient Education     ** THIS PT HAS BEEN \"RECAPTURED\" BY [facility] ON 1/20/25 -- HE HAS BEEN STABLE ON ENZALUTAMIDE SINCE MAY 2022, DISPENSED BY [facility] UNTIL NOW    Introduction  oJse George is a 74 y.o. male who is on the specialty pharmacy service for management of: Oncology Core.    Jsoe George is ** CONTINUING ** the following therapy: enzalutamide 160 mg (4 x 40 mg) PO once daily    Medication receipt date: approx. 1/20/25  Duration of therapy: Until drug toxicity or progression    The most recent encounter visit with provider Catherine Gamez CNP on 2/19/25 was reviewed.  Pharmacy will continue to collaborate in the care of this patient with the referring prescriber.    Clinical Background  An initial assessment was conducted prior to first fill of the medication to determine the appropriateness of therapy given the patient's diagnosis, medication list, comorbidities, allergies, medical history, patient's ability to self administer medication, and therapeutic goals based on possible outcomes of therapy. Refer to ** recapture** assessment task completed on 2/19/25.    Labs/Procedures for clinical appropriateness that were reviewed include:   Oncology - CBC-diff:   Lab Results   Component Value Date    WBC 3.3 (L) 02/13/2025    RBC 4.33 (L) 02/13/2025    HGB 13.4 (L) 02/13/2025    HCT 40.4 (L) 02/13/2025    MCV 93 02/13/2025    MCHC 33.2 02/13/2025     02/13/2025    RDW 13.7 02/13/2025    NEUTOPHILPCT 60.7 02/13/2025    IGPCT 0.3 02/13/2025    LYMPHOPCT 20.7 02/13/2025    MONOPCT 13.1 02/13/2025    EOSPCT 4.6 02/13/2025    BASOPCT 0.6 02/13/2025    NEUTROABS 1.99 02/13/2025    LYMPHSABS 0.68 (L) 02/13/2025    MONOSABS 0.43 02/13/2025    EOSABS 0.15 02/13/2025    BASOSABS 0.02 02/13/2025   , CMP:   Lab Results   Component Value Date    GLUCOSE 49 (L) 02/13/2025     02/13/2025    K 3.8 02/13/2025     02/13/2025    CO2 30 " 02/13/2025    ANIONGAP 10 02/13/2025    BUN 22 02/13/2025    CREATININE 0.95 02/13/2025    CALCIUM 9.2 02/13/2025    ALBUMIN 4.1 02/13/2025    ALKPHOS 49 02/13/2025    PROT 6.4 02/13/2025    AST 16 02/13/2025    BILITOT 0.5 02/13/2025    ALT 10 02/13/2025   , PSA:   Lab Results   Component Value Date    PSA 0.66 02/13/2025    PSAFREE 13.2 02/23/2021   , and Testosterone:   Lab Results   Component Value Date    TESTOSTERONE <30 (L) 12/26/2024       Education/Discussion  Jose was contacted on 3/22/2025 at 7:55 PM for a pharmacy visit with encounter number [id number] from:   [facility] [address]   [address]  [place] [address]  Dept: [phone]  Dept Fax: [phone]  Jose consented to a/an In person visit, which was performed.    Education was conducted prior to start of therapy? No - Patient is new to  Specialty Pharmacy but already established on current medication therapy     Education discussed includes the following:  Patient Education  Counseled the Patient on the Following : Theraputic rationale and expected outcomes, Doses and administration, Adherence and missed doses, Possible side effects and management, Possible drug interactions, Lab monitoring and follow-up, Safe handling, storage, and disposal  Learner: Patient  Education Method: Explanation  Education Response: Verbalizes understanding  Additional details of the medication specific counseling are found within the linked patient education flowsheet.     The follow up timeline was discussed. Every person responds to and reacts to therapy differently. Patient should be assessed for efficacy and tolerability in approximately: 6 months    Provided education on goals and possible outcomes of therapy:  Adherence with therapy  Timely completion of appropriate labs  Timely and appropriate follow up with provider  Identify and address medication interactions with presciption medications, OTC  medications and supplements  Optimize or maintain quality of life  Oncology: Prolong life/No disease progression  Manage side effects (ex: nausea/vomiting, constipation, fatigue) in conjunction with care team    The importance of adherence was discussed and they were advised to take the medication as prescribed by their provider.     Impression/Plan  Review and Assessment   Reviewed During This Encounter: Allergies, Medications, Problem list  Medications Assessed for Appropriate Use, Dose, Route, Frequency, and Duration: Yes  Medication Reconciliation Completed: Yes  Drug Interactions Evaluated: Yes  Clinically Relevant Drug Interactions Identified: No    This patient has been identified as high risk due to Geriatric (over 65 years of age).  The following action was taken: N/A.    QOL/Patient Satisfaction  Rate your quality of life on scale of 1-10: 7  Rate your satisfaction with  Specialty Pharmacy on scale of 1-10: 9    The  Specialty Pharmacy Welcome packet may be viewed here:   Specialty Pharmacy Welcome Packet     Or by scanning QR code:      Provided contact information ([phone]) for [facility] and reviewed dispensing process, refill timeline and patient management follow up. Advised to contact the pharmacy if there are any adverse effects and/or changes to medication list, including prescriptions, OTC medications, herbal products, or supplements. Confirmed understanding of education conducted during assessment. All questions and concerns were addressed and patient was encouraged to reach out for additional questions or concerns.      Carroll Scott RPh MS BCOP  Clinical Pharmacy Specialist - Ambulatory Oncology

## 2025-03-24 ENCOUNTER — APPOINTMENT (OUTPATIENT)
Dept: PRIMARY CARE | Facility: CLINIC | Age: 75
End: 2025-03-24
Payer: MEDICARE

## 2025-03-24 ENCOUNTER — TELEPHONE (OUTPATIENT)
Dept: HEMATOLOGY/ONCOLOGY | Facility: CLINIC | Age: 75
End: 2025-03-24

## 2025-03-24 NOTE — ASSESSMENT & PLAN NOTE
On 3/24/25 I received a call from  Jsoe George regarding his Xtandi 160 mg (4 x 40 mg) PO once daily, qty 120, 5 ref, Rx date 1/17/25, [facility].    It was a little hard to hear at times but Jose stated that he received a letter about being cut off from Xtandi program.  We discussed that he will attempt to email me a photo of this letter.  Then Jose ended the call.    However, [facility] took over dispensing in January.  He was previously enrolled in [facility].   [facility] confirmed that Jose's income was too high to get him re-enrolled with [facility], so he was switched over to [facility].  He has a christiano with [employer], which is still active.     [facility] then called and spoke with Jose and the letter is from the [facility].  She advised him to ignore this since he has assistance and is filling with [facility].

## 2025-03-27 ENCOUNTER — APPOINTMENT (OUTPATIENT)
Dept: PRIMARY CARE | Facility: CLINIC | Age: 75
End: 2025-03-27
Payer: MEDICARE

## 2025-03-27 ENCOUNTER — HOSPITAL ENCOUNTER (OUTPATIENT)
Dept: RADIOLOGY | Facility: CLINIC | Age: 75
Discharge: HOME | End: 2025-03-27
Payer: MEDICARE

## 2025-03-27 VITALS
WEIGHT: 171 LBS | DIASTOLIC BLOOD PRESSURE: 72 MMHG | BODY MASS INDEX: 28.49 KG/M2 | TEMPERATURE: 97 F | HEIGHT: 65 IN | SYSTOLIC BLOOD PRESSURE: 104 MMHG

## 2025-03-27 DIAGNOSIS — S06.5XAA SUBDURAL HEMATOMA, ACUTE (MULTI): ICD-10-CM

## 2025-03-27 DIAGNOSIS — Z12.11 SCREENING FOR COLORECTAL CANCER: ICD-10-CM

## 2025-03-27 DIAGNOSIS — Z12.12 SCREENING FOR COLORECTAL CANCER: ICD-10-CM

## 2025-03-27 DIAGNOSIS — Z00.00 ROUTINE GENERAL MEDICAL EXAMINATION AT HEALTH CARE FACILITY: ICD-10-CM

## 2025-03-27 DIAGNOSIS — I82.432 DEEP VEIN THROMBOSIS (DVT) OF POPLITEAL VEIN OF LEFT LOWER EXTREMITY, UNSPECIFIED CHRONICITY: ICD-10-CM

## 2025-03-27 DIAGNOSIS — M54.6 BILATERAL THORACIC BACK PAIN, UNSPECIFIED CHRONICITY: ICD-10-CM

## 2025-03-27 DIAGNOSIS — C61 NEOPLASM OF PROSTATE, DISTANT METASTASIS STAGING CATEGORY M1B: METASTASIS TO BONE (MULTI): Primary | ICD-10-CM

## 2025-03-27 DIAGNOSIS — C79.51 NEOPLASM OF PROSTATE, DISTANT METASTASIS STAGING CATEGORY M1B: METASTASIS TO BONE (MULTI): Primary | ICD-10-CM

## 2025-03-27 PROCEDURE — 1170F FXNL STATUS ASSESSED: CPT | Performed by: FAMILY MEDICINE

## 2025-03-27 PROCEDURE — 1159F MED LIST DOCD IN RCRD: CPT | Performed by: FAMILY MEDICINE

## 2025-03-27 PROCEDURE — 1123F ACP DISCUSS/DSCN MKR DOCD: CPT | Performed by: FAMILY MEDICINE

## 2025-03-27 PROCEDURE — 1036F TOBACCO NON-USER: CPT | Performed by: FAMILY MEDICINE

## 2025-03-27 PROCEDURE — G0439 PPPS, SUBSEQ VISIT: HCPCS | Performed by: FAMILY MEDICINE

## 2025-03-27 PROCEDURE — 72072 X-RAY EXAM THORAC SPINE 3VWS: CPT

## 2025-03-27 PROCEDURE — 99397 PER PM REEVAL EST PAT 65+ YR: CPT | Performed by: FAMILY MEDICINE

## 2025-03-27 PROCEDURE — 1158F ADVNC CARE PLAN TLK DOCD: CPT | Performed by: FAMILY MEDICINE

## 2025-03-27 PROCEDURE — 1160F RVW MEDS BY RX/DR IN RCRD: CPT | Performed by: FAMILY MEDICINE

## 2025-03-27 PROCEDURE — 3008F BODY MASS INDEX DOCD: CPT | Performed by: FAMILY MEDICINE

## 2025-03-27 ASSESSMENT — ENCOUNTER SYMPTOMS
FATIGUE: 0
MYALGIAS: 0
DEPRESSION: 0
NERVOUS/ANXIOUS: 1
LOSS OF SENSATION IN FEET: 0
DIARRHEA: 0
HEADACHES: 0
SHORTNESS OF BREATH: 0
ADENOPATHY: 0
NUMBNESS: 0
VOICE CHANGE: 0
SLEEP DISTURBANCE: 0
FREQUENCY: 0
COUGH: 0
RHINORRHEA: 0
PALPITATIONS: 0
NAUSEA: 0
DYSURIA: 0
HEMATURIA: 0
BLOOD IN STOOL: 0
WHEEZING: 0
DIZZINESS: 1
BACK PAIN: 1
WEAKNESS: 0
VOMITING: 0
SORE THROAT: 0
ROS SKIN COMMENTS: NO MOLES GROWING OR CHANGING.
TROUBLE SWALLOWING: 0
ARTHRALGIAS: 0
ABDOMINAL PAIN: 0
OCCASIONAL FEELINGS OF UNSTEADINESS: 1
WOUND: 0
SINUS PAIN: 0
DYSPHORIC MOOD: 0
FEVER: 0
CONSTIPATION: 0

## 2025-03-27 ASSESSMENT — ACTIVITIES OF DAILY LIVING (ADL)
DRESSING: INDEPENDENT
DOING_HOUSEWORK: INDEPENDENT
MANAGING_FINANCES: INDEPENDENT
BATHING: INDEPENDENT
GROCERY_SHOPPING: INDEPENDENT
TAKING_MEDICATION: INDEPENDENT

## 2025-03-27 ASSESSMENT — PATIENT HEALTH QUESTIONNAIRE - PHQ9
SUM OF ALL RESPONSES TO PHQ9 QUESTIONS 1 AND 2: 0
2. FEELING DOWN, DEPRESSED OR HOPELESS: NOT AT ALL
1. LITTLE INTEREST OR PLEASURE IN DOING THINGS: NOT AT ALL

## 2025-03-27 NOTE — PROGRESS NOTES
Subjective   Reason for Visit: Jose George is an 74 y.o. male here for a Medicare Wellness visit.          He was in the ER 2/28/25.  Report from that encounter was reviewed today.  He fell and hit his head.  He had a history of a subderal hematoma from 10 years ago which required a craniotomy.  He denies LOC from this most recent fall.  CT scan showed prior craniotomy with no acute changes.  He has a remote hx of DVT (more than five years ago) vbut is not on anticoagulation.    He has a history of prostate cancer and is on Xtandi.    In February 2024 he fell and sustained a right elbow fracture that required surgical repair.     Reviewed all medications by prescribing practitioner or clinical pharmacist (such as prescriptions, OTCs, herbal therapies and supplements) and documented in the medical record.  Current Outpatient Medications on File Prior to Visit   Medication Sig Dispense Refill    dupilumab (Dupixent Pen) 300 mg/2 mL injection Inject under the skin 1 time. A month      enzalutamide (Xtandi) 40 mg tablet Take 4 tablets (160 mg total) by mouth once daily.  Take with or without food at the same time each day. Do not crush, break, or dissolve. Swallow it whole. 120 tablet 5    leuprolide, 6-month, (Lupron Depot, 6 Month,) 45 mg injection Inject into the muscle every 6 months.      [DISCONTINUED] enzalutamide (Xtandi) 40 mg tablet Take 4 tablets (160 mg total) by mouth once daily.  Take with or without food at the same time each day. Do not crush, break, or dissolve. Swallow it whole. 120 tablet 5    [DISCONTINUED] ketotifen (Zaditor) 0.025 % (0.035 %) ophthalmic solution Administer 1 drop into both eyes 2 times a day. 5 mL 0     No current facility-administered medications on file prior to visit.     HPI    Tobacco Use: Low Risk  (3/27/2025)    Patient History     Smoking Tobacco Use: Never     Smokeless Tobacco Use: Never     Passive Exposure: Not on file   No alcohol.    No drug use.  He has advanced  "directives.  Advised to bring in a copy.  Full code. POA would be his wife.  He is Taoism and wants no blood products.     Patient Care Team:  Jatin Soriano DO as PCP - General (Family Medicine)  Jatin Soriano DO as PCP - MMO Medicare Advantage PCP  Gabe Garza MD as Consulting Physician (Hematology and Oncology)     Review of Systems   Constitutional:  Negative for fatigue and fever.   HENT:  Negative for congestion, rhinorrhea, sinus pain, sore throat, trouble swallowing and voice change.    Respiratory:  Negative for cough, shortness of breath and wheezing.    Cardiovascular:  Negative for chest pain, palpitations and leg swelling.   Gastrointestinal:  Negative for abdominal pain, blood in stool, constipation, diarrhea, nausea and vomiting.   Genitourinary:  Negative for dysuria, frequency and hematuria.   Musculoskeletal:  Positive for back pain (thoracic muscles hurt with walking.). Negative for arthralgias and myalgias.   Skin:  Negative for rash and wound.        No moles growing or changing.   Neurological:  Positive for dizziness. Negative for syncope, weakness, numbness and headaches.   Hematological:  Negative for adenopathy.   Psychiatric/Behavioral:  Negative for dysphoric mood, self-injury, sleep disturbance and suicidal ideas. The patient is nervous/anxious.        Objective   Vitals:  /72 (BP Location: Left arm, Patient Position: Sitting)   Temp 36.1 °C (97 °F) (Skin)   Ht 1.638 m (5' 4.5\")   Wt 77.6 kg (171 lb)   BMI 28.90 kg/m²       Physical Exam  Vitals reviewed.   Constitutional:       General: He is not in acute distress.     Appearance: Normal appearance. He is not ill-appearing or toxic-appearing.   HENT:      Head: Normocephalic and atraumatic.      Right Ear: Tympanic membrane, ear canal and external ear normal.      Left Ear: Tympanic membrane, ear canal and external ear normal.      Nose: Nose normal.      Mouth/Throat:      Mouth: Mucous membranes " are moist.   Eyes:      Extraocular Movements: Extraocular movements intact.      Conjunctiva/sclera: Conjunctivae normal.      Pupils: Pupils are equal, round, and reactive to light.   Cardiovascular:      Rate and Rhythm: Normal rate and regular rhythm.      Heart sounds: No murmur heard.  Pulmonary:      Effort: Pulmonary effort is normal.      Breath sounds: Normal breath sounds.   Abdominal:      General: Bowel sounds are normal. There is no distension.      Palpations: Abdomen is soft. There is no mass.      Tenderness: There is no abdominal tenderness. There is no guarding or rebound.   Musculoskeletal:         General: No tenderness.      Cervical back: Neck supple.      Right lower leg: No edema.      Left lower leg: No edema.   Skin:     Coloration: Skin is not jaundiced or pale.      Findings: No rash.   Neurological:      General: No focal deficit present.      Mental Status: He is alert and oriented to person, place, and time. Mental status is at baseline.   Psychiatric:         Mood and Affect: Mood normal.         Behavior: Behavior normal.         Thought Content: Thought content normal.         Judgment: Judgment normal.         Assessment & Plan  Subdural hematoma, acute (Multi)    Orders:    No blood or blood products    Neoplasm of prostate, distant metastasis staging category M1b: metastasis to bone (Multi)         Deep vein thrombosis (DVT) of popliteal vein of left lower extremity, unspecified chronicity (Multi)         Bilateral thoracic back pain, unspecified chronicity    Orders:    XR thoracic spine 3 views; Future    Referral to Physical Therapy; Future    Routine general medical examination at health care facility    Orders:    1 Year Follow Up In Primary Care - Wellness Exam; Future    Screening for colorectal cancer    Orders:    Cologuard®; Future         Annual Wellness exam completed   Preventive Health history reviewed:  Labs ordered    Low dose CT for lung cancer screening not  indicated.  Never a smoker.   Prostate cancer screening--known history of prostate cancer.  PSA was 0.54 in December.  It was 7 to 11 prior to treatment.  Cscope or Cologuard --  FIT testing done in 2022.  Recommended colonoscopy and discussed indications.  He declines this. Agrees to Cologuard.  Depression Screening done  Advanced Directives Discussion Completed  Cardiovascular risk discussed and if needed, lifestyle modifications recommended, including nutritional choices, exercise, and elimination of habits contributing to risk.  We agreed on a plan to reduce the current cardiovascular risk.  See ecalc ASCVD Risk  Plus for data discussed regarding risk and risk reduction opportunities.  Aspirin use not indicated after reviewing the updated guidelines.   Immunizations:  Influenza recommended yearly in the fall.  Prevnar 20 done 2/1/2024  Shingrix recommended at the pharmacy.  Tdap done 2023  The 10-year ASCVD risk score (Ladarius GALDAMEZ, et al., 2019) is: 13.5%    Values used to calculate the score:      Age: 74 years      Sex: Male      Is Non- : No      Diabetic: No      Tobacco smoker: No      Systolic Blood Pressure: 104 mmHg      Is BP treated: No      HDL Cholesterol: 54.6 mg/dL      Total Cholesterol: 147 mg/dL  Falls--one this year and one last year.  Offered PT.  He states he has had PT and does the home exercises.  Agrees to more PT if they address his back.     Follow up with hem/onc.  I will order PT to prevent falls and to treat your back pain. An xray was ordered of your thoracic spine.  I recommend that you get Shingrix vaccines at your pharmacy.  Return in one month.      Continue your present medications.

## 2025-03-27 NOTE — PATIENT INSTRUCTIONS
Follow up with hem/onc.  I will order PT to prevent falls and to treat your back pain. An xray was ordered of your thoracic spine.  I recommend that you get Shingrix vaccines at your pharmacy.  Return in one month.      Continue your present medications.

## 2025-03-30 DIAGNOSIS — C79.51 NEOPLASM OF PROSTATE, DISTANT METASTASIS STAGING CATEGORY M1B: METASTASIS TO BONE (MULTI): ICD-10-CM

## 2025-03-30 DIAGNOSIS — M89.9 BONE LESION: Primary | ICD-10-CM

## 2025-03-30 DIAGNOSIS — C61 NEOPLASM OF PROSTATE, DISTANT METASTASIS STAGING CATEGORY M1B: METASTASIS TO BONE (MULTI): ICD-10-CM

## 2025-04-01 ENCOUNTER — HOSPITAL ENCOUNTER (OUTPATIENT)
Dept: RADIOLOGY | Facility: CLINIC | Age: 75
Discharge: HOME | End: 2025-04-01
Payer: MEDICARE

## 2025-04-01 DIAGNOSIS — C79.51 NEOPLASM OF PROSTATE, DISTANT METASTASIS STAGING CATEGORY M1B: METASTASIS TO BONE (MULTI): ICD-10-CM

## 2025-04-01 DIAGNOSIS — C61 NEOPLASM OF PROSTATE, DISTANT METASTASIS STAGING CATEGORY M1B: METASTASIS TO BONE (MULTI): ICD-10-CM

## 2025-04-01 DIAGNOSIS — M89.9 BONE LESION: ICD-10-CM

## 2025-04-01 PROCEDURE — 73060 X-RAY EXAM OF HUMERUS: CPT | Mod: RT

## 2025-04-01 PROCEDURE — 73060 X-RAY EXAM OF HUMERUS: CPT | Mod: RIGHT SIDE | Performed by: RADIOLOGY

## 2025-04-08 DIAGNOSIS — C61 PROSTATE CANCER METASTATIC TO BONE (MULTI): Primary | ICD-10-CM

## 2025-04-08 DIAGNOSIS — C79.51 PROSTATE CANCER METASTATIC TO BONE (MULTI): Primary | ICD-10-CM

## 2025-04-09 LAB — NONINV COLON CA DNA+OCC BLD SCRN STL QL: NEGATIVE

## 2025-04-16 ENCOUNTER — LAB (OUTPATIENT)
Dept: LAB | Facility: CLINIC | Age: 75
End: 2025-04-16
Payer: MEDICARE

## 2025-04-16 DIAGNOSIS — C61 PROSTATE CANCER METASTATIC TO BONE (MULTI): ICD-10-CM

## 2025-04-16 DIAGNOSIS — C79.51 PROSTATE CANCER METASTATIC TO BONE (MULTI): ICD-10-CM

## 2025-04-16 LAB
ALBUMIN SERPL BCP-MCNC: 4.2 G/DL (ref 3.4–5)
ALP SERPL-CCNC: 48 U/L (ref 33–136)
ALT SERPL W P-5'-P-CCNC: 10 U/L (ref 10–52)
ANION GAP SERPL CALC-SCNC: 10 MMOL/L (ref 10–20)
AST SERPL W P-5'-P-CCNC: 16 U/L (ref 9–39)
BASOPHILS # BLD AUTO: 0.01 X10*3/UL (ref 0–0.1)
BASOPHILS NFR BLD AUTO: 0.3 %
BILIRUB SERPL-MCNC: 0.5 MG/DL (ref 0–1.2)
BUN SERPL-MCNC: 22 MG/DL (ref 6–23)
CALCIUM SERPL-MCNC: 9.6 MG/DL (ref 8.6–10.3)
CHLORIDE SERPL-SCNC: 101 MMOL/L (ref 98–107)
CO2 SERPL-SCNC: 31 MMOL/L (ref 21–32)
CREAT SERPL-MCNC: 1.35 MG/DL (ref 0.5–1.3)
EGFRCR SERPLBLD CKD-EPI 2021: 55 ML/MIN/1.73M*2
EOSINOPHIL # BLD AUTO: 0.13 X10*3/UL (ref 0–0.4)
EOSINOPHIL NFR BLD AUTO: 3.6 %
ERYTHROCYTE [DISTWIDTH] IN BLOOD BY AUTOMATED COUNT: 13.5 % (ref 11.5–14.5)
GLUCOSE SERPL-MCNC: 80 MG/DL (ref 74–99)
HCT VFR BLD AUTO: 41.3 % (ref 41–52)
HGB BLD-MCNC: 13.7 G/DL (ref 13.5–17.5)
IMM GRANULOCYTES # BLD AUTO: 0 X10*3/UL (ref 0–0.5)
IMM GRANULOCYTES NFR BLD AUTO: 0 % (ref 0–0.9)
LYMPHOCYTES # BLD AUTO: 0.76 X10*3/UL (ref 0.8–3)
LYMPHOCYTES NFR BLD AUTO: 21.3 %
MCH RBC QN AUTO: 31.2 PG (ref 26–34)
MCHC RBC AUTO-ENTMCNC: 33.2 G/DL (ref 32–36)
MCV RBC AUTO: 94 FL (ref 80–100)
MONOCYTES # BLD AUTO: 0.34 X10*3/UL (ref 0.05–0.8)
MONOCYTES NFR BLD AUTO: 9.5 %
NEUTROPHILS # BLD AUTO: 2.33 X10*3/UL (ref 1.6–5.5)
NEUTROPHILS NFR BLD AUTO: 65.3 %
PLATELET # BLD AUTO: 202 X10*3/UL (ref 150–450)
POTASSIUM SERPL-SCNC: 4.2 MMOL/L (ref 3.5–5.3)
PROT SERPL-MCNC: 6.6 G/DL (ref 6.4–8.2)
RBC # BLD AUTO: 4.39 X10*6/UL (ref 4.5–5.9)
SODIUM SERPL-SCNC: 138 MMOL/L (ref 136–145)
WBC # BLD AUTO: 3.6 X10*3/UL (ref 4.4–11.3)

## 2025-04-16 PROCEDURE — 85025 COMPLETE CBC W/AUTO DIFF WBC: CPT

## 2025-04-16 PROCEDURE — 80053 COMPREHEN METABOLIC PANEL: CPT

## 2025-04-16 PROCEDURE — 36415 COLL VENOUS BLD VENIPUNCTURE: CPT

## 2025-04-16 PROCEDURE — 84153 ASSAY OF PSA TOTAL: CPT

## 2025-04-16 ASSESSMENT — ENCOUNTER SYMPTOMS
EYES NEGATIVE: 1
DECREASED CONCENTRATION: 1
HEMATURIA: 0
WOUND: 0
CARDIOVASCULAR NEGATIVE: 1
UNEXPECTED WEIGHT CHANGE: 0
RESPIRATORY NEGATIVE: 1
APPETITE CHANGE: 0
GASTROINTESTINAL NEGATIVE: 1
HOT FLASHES: 0
DIZZINESS: 1
HEMATOLOGIC/LYMPHATIC NEGATIVE: 1
FEVER: 0
FATIGUE: 0

## 2025-04-16 NOTE — PROGRESS NOTES
Patient ID: Jose George is a 74 y.o. male.  Diagnosis:  Prostate Cancer   MedOn: Dr. Garza  Primary Care Provider: Jatin Soriano DO  Referring Provider: No referring provider defined for this encounter.  Visit Type: Follow Up    Date of Service: 04/22/25  Location: [facility]     Patient Care Team:  Jatin Soriano DO as PCP - General (Family Medicine)  Jatin Soriano DO as PCP - O Medicare Advantage PCP  Gabe Garza MD as Consulting Physician (Hematology and Oncology)    Current Therapy: enzalutamide and ADT     ONCOLOGIC HISTORY     No matching staging information was found for the patient.    Prostate Cancer   8/17/2006: PSA 0.9  2/23/2021: urinary frequency and slow flow;   3/5/2021: left leg swelling--doppler showing occlusive deep vein thrombosis --> started on eliquis  3/25/2021: Prostate bx with Dr. Dumont   4/15/2021: started casodex  4/30/2021: CT scan and bone scan with extraprostatic spread of disease involving the mesorectal fascia as well as a mesorectal fat with close proximity to the rectum without obvious involvement of the rectum. Extensive abdominopelvic lymphadenopathy consistent with alva metastasis; acetabula  (right greater than left), right ischium, and the sacrum bone involvement   5/4/2021: ADT started   5/2022: started enzalutamide   5/10/2024: PSMA PET -no abnormal focal Ga68 PSMA uptake in neck and chest; no abnormal Ga68 PSMA uptake within abdominal lymph nodes; mild heterogeneous Ga68 uptake seen in prostate gland with SUV of 1.9; no abnormal Ga68 PSMA uptake is seen within periprostatic and pelvic lymph nodes; there is markedly intense gallium 68 PSMA uptake in L5-- previously noted foci on 2021 bone scan in bilateral acetabulae, right ischium and sacrum are not evidence of current PET  -as he has such limited new disease, I don't think we need to radically alter his treatment (such as switch to chemo with docetaxel)  7/2024: SBRT to  "L5    Bone metastases  -secondary to prostate cancer  -as androgen deprivation therapy is expected to diminish bone density over time, will consider addition of either bisphosphonate (zometa) or RANKL inhibitor (xgeva) to help build back bone density and to minimize risk for pathologic fracture  -10/12/2023 fell and broke his right elbow --> injured/twisted his left hip in the process  -DEXA was booked for 1/10/2024 but he cancelled appointment because he caught Covid  -DEXA done on 3/8/2024 consistent with osteoporosis    Oncology History   Neoplasm of prostate, 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 male who presents today for follow up of prostate cancer. Patient of Dr. Garza currently on enzalutamide and ADT. Tolerating treatment well with no new toxicities. Was noticing back pain and \"locking up\" when walking distances and has Xrays with his PCP. Asking for my opinion about addressing his kyphosis. He has a PT appt this coming Monday. He has arthritic pain in his shoulders. No other new bone pain. He denies urinary symptoms and GI symptoms. Denies hot flashes. No issues with taking Xtandi daily. He has eczema being treated with Dupixent.    Review of Systems   Constitutional:  Negative for appetite change, fatigue, fever and unexpected weight change.   HENT:  Negative.     Eyes: Negative.    Respiratory: Negative.     Cardiovascular: Negative.    Gastrointestinal: Negative.    Endocrine: " Negative for hot flashes.   Genitourinary: Negative.  Negative for hematuria.    Musculoskeletal:  Positive for arthralgias and gait problem.   Skin:  Negative for rash and wound.   Neurological:  Positive for dizziness and gait problem.   Hematological: Negative.    Psychiatric/Behavioral:  Positive for decreased concentration.      Objective      /77 (BP Location: Right arm, Patient Position: Sitting, BP Cuff Size: Adult)   Pulse 63   Temp 35.9 °C (96.6 °F) (Temporal)   Resp 16   Wt 75.6 kg (166 lb 11.2 oz) Comment: NO SHOES,COAT  SpO2 95%   BMI 28.17 kg/m²   BSA: 1.85 meters squared    Wt Readings from Last 5 Encounters:   04/22/25 75.6 kg (166 lb 11.2 oz)   03/27/25 77.6 kg (171 lb)   02/19/25 77.7 kg (171 lb 4.8 oz)   01/13/25 75.8 kg (167 lb)   01/03/25 76.3 kg (168 lb 3.4 oz)     Performance Status:  ECOG Score: 1- Restricted in physically strenuous activity.  Carries out light duty.  Karnofsky Score: 90 - Able to carry on normal activity; minor signs or symptoms of disease     PHYSICAL EXAM   Physical Exam  Constitutional:       General: He is not in acute distress.  HENT:      Head: Normocephalic and atraumatic.      Mouth/Throat:      Mouth: Mucous membranes are moist.      Pharynx: Oropharynx is clear.   Eyes:      Pupils: Pupils are equal, round, and reactive to light.   Pulmonary:      Effort: Pulmonary effort is normal.   Musculoskeletal:         General: Normal range of motion.      Cervical back: Normal range of motion.   Neurological:      General: No focal deficit present.      Mental Status: He is alert and oriented to person, place, and time.      Motor: No weakness.   Psychiatric:         Mood and Affect: Mood normal.         Behavior: Behavior normal.         Thought Content: Thought content normal.         Judgment: Judgment normal.     Allergies  Allergies   Allergen Reactions    Pollen Extracts Headache     Sinus headache, itchy watery eyes      Medications  Current Outpatient  Medications   Medication Instructions    dupilumab (Dupixent Pen) 300 mg/2 mL injection Once    enzalutamide (XTANDI) 160 mg, oral, Daily, Take with or without food at the same time each day. Do not crush, break, or dissolve. Swallow it whole.    leuprolide, 6-month, (Lupron Depot, 6 Month,) 45 mg injection Every 6 months        Diagnostic Results   RESULTS     No results found for this or any previous visit (from the past 96 hours).   Latest Reference Range & Units 04/16/25 10:19   GLUCOSE 74 - 99 mg/dL 80   SODIUM 136 - 145 mmol/L 138   POTASSIUM 3.5 - 5.3 mmol/L 4.2   CHLORIDE 98 - 107 mmol/L 101   Bicarbonate 21 - 32 mmol/L 31   Anion Gap 10 - 20 mmol/L 10   Blood Urea Nitrogen 6 - 23 mg/dL 22   Creatinine 0.50 - 1.30 mg/dL 1.35 (H)   EGFR >60 mL/min/1.73m*2 55 (L)   Calcium 8.6 - 10.3 mg/dL 9.6   Albumin 3.4 - 5.0 g/dL 4.2   Alkaline Phosphatase 33 - 136 U/L 48   ALT 10 - 52 U/L 10   AST 9 - 39 U/L 16   Bilirubin Total 0.0 - 1.2 mg/dL 0.5   (H): Data is abnormally high  (L): Data is abnormally low     Latest Reference Range & Units 04/16/25 10:19   WBC 4.4 - 11.3 x10*3/uL 3.6 (L)   RBC 4.50 - 5.90 x10*6/uL 4.39 (L)   HEMOGLOBIN 13.5 - 17.5 g/dL 13.7   HEMATOCRIT 41.0 - 52.0 % 41.3   MCV 80 - 100 fL 94   MCH 26.0 - 34.0 pg 31.2   MCHC 32.0 - 36.0 g/dL 33.2   RED CELL DISTRIBUTION WIDTH 11.5 - 14.5 % 13.5   Platelets 150 - 450 x10*3/uL 202   Neutrophils % 40.0 - 80.0 % 65.3   Immature Granulocytes %, Automated 0.0 - 0.9 % 0.0   Lymphocytes % 13.0 - 44.0 % 21.3   Monocytes % 2.0 - 10.0 % 9.5   Eosinophils % 0.0 - 6.0 % 3.6   Basophils % 0.0 - 2.0 % 0.3   Neutrophils Absolute 1.60 - 5.50 x10*3/uL 2.33   Immature Granulocytes Absolute, Automated 0.00 - 0.50 x10*3/uL 0.00   Lymphocytes Absolute 0.80 - 3.00 x10*3/uL 0.76 (L)   Monocytes Absolute 0.05 - 0.80 x10*3/uL 0.34   Eosinophils Absolute 0.00 - 0.40 x10*3/uL 0.13   Basophils Absolute 0.00 - 0.10 x10*3/uL 0.01     Lab Results   Component Value Date    PSA 0.93  04/16/2025    PSA 0.66 02/13/2025    PSA 0.54 12/26/2024     Lab Results   Component Value Date    TESTOSTERONE <30 (L) 12/26/2024     Recent Imaging     3/28/2025 Thoracic spine X Ray  IMPRESSION:  Mild kyphosis. No acute abnormality seen radiographically.  Partially visualized sclerotic lesion in the proximal humerus    4/2/2025 Humerus Right X Ray  IMPRESSION:  No acute abnormality seen. Moderate degenerative changes the  glenohumeral joint. Olecranon fracture fixation noted    5/10/2024 PSMA PET   IMPRESSION:  Mild heterogeneous Ga68 PSMA uptake seen in the prostate gland.  Finding is nonspecific.      Focus of markedly intense killing 68 PSMA uptake in the region of L5.  Finding would be consistent an osseous metastatic focus.      It should be noted that the previously noted foci (on prior bone  scan) in the bilateral acetabulae, right ischium and sacrum are not  readily evident on PSMA PET-CT.    Assessment/Plan   ASSESSMENT     Jose George is a 74 y.o. male with high grade prostate cancer (GS 9, iPSA 160) bone and node involvement who presents in follow up on enzalutamide and ADT since 2022. Last PET in May of 2024, he was treated with SBRT to L5 in July.     He is tolerating ADT and enzalutamide well with some brain fog and balance issues/dizziness.    CBC with lymphopenia.   CMP with elevated creatinine - has been drinking mostly tea. BPs are controlled, blood glucose is controlled. No nephrotoxic medications. He will increase his PO water intake and we will monitor.   PSA trended down from 11 to 0.54 post XRT in July. Now moving up slightly to 0.93; doubling time is about 4.5 months, with no new symptoms or concerns.   Last T suppressed.   He was having back pain with walking distances, kyphosis on Xray. He starts PT on Monday. He asked my opinion. I recommended he at least meet with a back orthopedic doctor to see what they have to offer if anything. He has a history of radiation to the lumbar  spine, pain is now in his thoracic spine. His prostate cancer is currently controlled on medication.     PLAN     # Prostate Cancer   - Continue enzalutamide 160 mg daily  - ADT today and  every 6 months   - CBC, CMP, PSA every ~ 6-8 weeks.     # Back pain  - PT   - Consider ortho consult     # Brain Fog/Balance Issues   - Continue strengthening exercises  - Puzzles and reading    Follow up in 6-8 weeks with Dr. Garza, aurelio prior.      Jose was seen today for follow-up.  Diagnoses and all orders for this visit:  Prostate cancer metastatic to bone (Multi) (Primary)  -     CBC and Auto Differential; Future  -     Comprehensive Metabolic Panel; Future  -     Prostate Specific Antigen; Future  -     Testosterone; Future  -     Clinic Appointment Request Follow Up; MARLA GARZA; [facility]; Future    Enzalutamide, 84 Day Cycles  Leuprolide Acetate, Every 24 Weeks  Venous Access Orders    Patient verbalizes understanding of above plan. Time provided for patient's questions. All questions answered to patient's satisfaction in office. Patient instructed to reach out for any new concerning issues at [phone].    Catherine Gamez MSN, APRN, A-GNP-C, AOCNP  [facility]  Division of Hematology & Medical Oncology   [address] [place] [address]  Phone: [phone]  Available via [employer] Secure Chat  [email or web address]

## 2025-04-17 LAB — PSA SERPL-MCNC: 0.93 NG/ML

## 2025-04-22 ENCOUNTER — SPECIALTY PHARMACY (OUTPATIENT)
Dept: PHARMACY | Facility: CLINIC | Age: 75
End: 2025-04-22

## 2025-04-22 ENCOUNTER — OFFICE VISIT (OUTPATIENT)
Dept: HEMATOLOGY/ONCOLOGY | Facility: CLINIC | Age: 75
End: 2025-04-22
Payer: MEDICARE

## 2025-04-22 ENCOUNTER — INFUSION (OUTPATIENT)
Dept: HEMATOLOGY/ONCOLOGY | Facility: CLINIC | Age: 75
End: 2025-04-22
Payer: MEDICARE

## 2025-04-22 VITALS
WEIGHT: 166.7 LBS | TEMPERATURE: 96.6 F | BODY MASS INDEX: 28.17 KG/M2 | SYSTOLIC BLOOD PRESSURE: 110 MMHG | RESPIRATION RATE: 16 BRPM | HEART RATE: 63 BPM | DIASTOLIC BLOOD PRESSURE: 77 MMHG | OXYGEN SATURATION: 95 %

## 2025-04-22 DIAGNOSIS — C79.51 PROSTATE CANCER METASTATIC TO BONE (MULTI): ICD-10-CM

## 2025-04-22 DIAGNOSIS — C61 PROSTATE CANCER METASTATIC TO BONE (MULTI): Primary | ICD-10-CM

## 2025-04-22 DIAGNOSIS — C61 PROSTATE CANCER METASTATIC TO BONE (MULTI): ICD-10-CM

## 2025-04-22 DIAGNOSIS — C79.51 PROSTATE CANCER METASTATIC TO BONE (MULTI): Primary | ICD-10-CM

## 2025-04-22 DIAGNOSIS — C79.51 NEOPLASM OF PROSTATE, DISTANT METASTASIS STAGING CATEGORY M1B: METASTASIS TO BONE (MULTI): ICD-10-CM

## 2025-04-22 DIAGNOSIS — C61 NEOPLASM OF PROSTATE, DISTANT METASTASIS STAGING CATEGORY M1B: METASTASIS TO BONE (MULTI): ICD-10-CM

## 2025-04-22 PROCEDURE — 1160F RVW MEDS BY RX/DR IN RCRD: CPT

## 2025-04-22 PROCEDURE — 1123F ACP DISCUSS/DSCN MKR DOCD: CPT

## 2025-04-22 PROCEDURE — RXMED WILLOW AMBULATORY MEDICATION CHARGE

## 2025-04-22 PROCEDURE — 99214 OFFICE O/P EST MOD 30 MIN: CPT

## 2025-04-22 PROCEDURE — 2500000004 HC RX 250 GENERAL PHARMACY W/ HCPCS (ALT 636 FOR OP/ED): Mod: JZ,TB | Performed by: INTERNAL MEDICINE

## 2025-04-22 PROCEDURE — 1126F AMNT PAIN NOTED NONE PRSNT: CPT

## 2025-04-22 PROCEDURE — 96402 CHEMO HORMON ANTINEOPL SQ/IM: CPT

## 2025-04-22 PROCEDURE — 1159F MED LIST DOCD IN RCRD: CPT

## 2025-04-22 RX ORDER — EPINEPHRINE 0.3 MG/.3ML
0.3 INJECTION SUBCUTANEOUS EVERY 5 MIN PRN
Status: DISCONTINUED | OUTPATIENT
Start: 2025-04-22 | End: 2025-04-22 | Stop reason: HOSPADM

## 2025-04-22 RX ORDER — DIPHENHYDRAMINE HYDROCHLORIDE 50 MG/ML
50 INJECTION, SOLUTION INTRAMUSCULAR; INTRAVENOUS AS NEEDED
Status: DISCONTINUED | OUTPATIENT
Start: 2025-04-22 | End: 2025-04-22 | Stop reason: HOSPADM

## 2025-04-22 RX ORDER — FAMOTIDINE 10 MG/ML
20 INJECTION, SOLUTION INTRAVENOUS ONCE AS NEEDED
OUTPATIENT
Start: 2025-10-07

## 2025-04-22 RX ORDER — EPINEPHRINE 0.3 MG/.3ML
0.3 INJECTION SUBCUTANEOUS EVERY 5 MIN PRN
OUTPATIENT
Start: 2025-10-07

## 2025-04-22 RX ORDER — FAMOTIDINE 10 MG/ML
20 INJECTION, SOLUTION INTRAVENOUS ONCE AS NEEDED
Status: DISCONTINUED | OUTPATIENT
Start: 2025-04-22 | End: 2025-04-22 | Stop reason: HOSPADM

## 2025-04-22 RX ORDER — DIPHENHYDRAMINE HYDROCHLORIDE 50 MG/ML
50 INJECTION, SOLUTION INTRAMUSCULAR; INTRAVENOUS AS NEEDED
OUTPATIENT
Start: 2025-10-07

## 2025-04-22 RX ORDER — ALBUTEROL SULFATE 0.83 MG/ML
3 SOLUTION RESPIRATORY (INHALATION) AS NEEDED
OUTPATIENT
Start: 2025-10-07

## 2025-04-22 RX ORDER — ALBUTEROL SULFATE 0.83 MG/ML
3 SOLUTION RESPIRATORY (INHALATION) AS NEEDED
Status: DISCONTINUED | OUTPATIENT
Start: 2025-04-22 | End: 2025-04-22 | Stop reason: HOSPADM

## 2025-04-22 RX ADMIN — LEUPROLIDE ACETATE 45 MG: KIT at 10:52

## 2025-04-22 ASSESSMENT — PAIN SCALES - GENERAL: PAINLEVEL_OUTOF10: 0-NO PAIN

## 2025-04-22 ASSESSMENT — ENCOUNTER SYMPTOMS: ARTHRALGIAS: 1

## 2025-04-22 NOTE — TELEPHONE ENCOUNTER
[facility] Refill Management:    Medication(s) Requested: enzalutamide 160 mg (4 x 40 mg) PO once daily, qty # 120, 5 refills    Date of Request: 04/22/25 12:28 PM    - Labs in last 3 months: Yes    Date: 4/16/25  - Office visit in last 3 months: Yes    Date: 4/22/25  - Changes in medications in last 3 months: No  - Actionable labs or dose adjustments requiring physician clarification: No    Next FUV scheduled for 6/3/25 w/ Gabe Garza MD    Approved for refill under Consult Agreement: Yes    Comments: Prescription sent on 1/17/25 was inadvertently discontinued;  resent on 4/22/25.    Carroll Scott, RPh, MS, BCOP  Clinical Pharmacy Specialist - Ambulatory Oncology

## 2025-04-23 ENCOUNTER — PHARMACY VISIT (OUTPATIENT)
Dept: PHARMACY | Facility: CLINIC | Age: 75
End: 2025-04-23
Payer: COMMERCIAL

## 2025-05-16 ENCOUNTER — SPECIALTY PHARMACY (OUTPATIENT)
Dept: PHARMACY | Facility: CLINIC | Age: 75
End: 2025-05-16

## 2025-05-16 PROCEDURE — RXMED WILLOW AMBULATORY MEDICATION CHARGE

## 2025-05-19 ENCOUNTER — PHARMACY VISIT (OUTPATIENT)
Dept: PHARMACY | Facility: CLINIC | Age: 75
End: 2025-05-19
Payer: COMMERCIAL

## 2025-05-29 ENCOUNTER — LAB (OUTPATIENT)
Dept: LAB | Facility: CLINIC | Age: 75
End: 2025-05-29
Payer: MEDICARE

## 2025-05-29 LAB
ALBUMIN SERPL BCP-MCNC: 3.9 G/DL (ref 3.4–5)
ALP SERPL-CCNC: 41 U/L (ref 33–136)
ALT SERPL W P-5'-P-CCNC: 9 U/L (ref 10–52)
ANION GAP SERPL CALC-SCNC: 11 MMOL/L (ref 10–20)
AST SERPL W P-5'-P-CCNC: 20 U/L (ref 9–39)
BASOPHILS # BLD AUTO: 0.01 X10*3/UL (ref 0–0.1)
BASOPHILS NFR BLD AUTO: 0.3 %
BILIRUB SERPL-MCNC: 0.5 MG/DL (ref 0–1.2)
BUN SERPL-MCNC: 20 MG/DL (ref 6–23)
CALCIUM SERPL-MCNC: 9.2 MG/DL (ref 8.6–10.3)
CHLORIDE SERPL-SCNC: 104 MMOL/L (ref 98–107)
CO2 SERPL-SCNC: 28 MMOL/L (ref 21–32)
CREAT SERPL-MCNC: 1.01 MG/DL (ref 0.5–1.3)
EGFRCR SERPLBLD CKD-EPI 2021: 79 ML/MIN/1.73M*2
EOSINOPHIL # BLD AUTO: 0.1 X10*3/UL (ref 0–0.4)
EOSINOPHIL NFR BLD AUTO: 3.5 %
ERYTHROCYTE [DISTWIDTH] IN BLOOD BY AUTOMATED COUNT: 13.5 % (ref 11.5–14.5)
GLUCOSE SERPL-MCNC: 95 MG/DL (ref 74–99)
HCT VFR BLD AUTO: 39.3 % (ref 41–52)
HGB BLD-MCNC: 13.1 G/DL (ref 13.5–17.5)
IMM GRANULOCYTES # BLD AUTO: 0.01 X10*3/UL (ref 0–0.5)
IMM GRANULOCYTES NFR BLD AUTO: 0.3 % (ref 0–0.9)
LYMPHOCYTES # BLD AUTO: 0.73 X10*3/UL (ref 0.8–3)
LYMPHOCYTES NFR BLD AUTO: 25.3 %
MCH RBC QN AUTO: 31 PG (ref 26–34)
MCHC RBC AUTO-ENTMCNC: 33.3 G/DL (ref 32–36)
MCV RBC AUTO: 93 FL (ref 80–100)
MONOCYTES # BLD AUTO: 0.26 X10*3/UL (ref 0.05–0.8)
MONOCYTES NFR BLD AUTO: 9 %
NEUTROPHILS # BLD AUTO: 1.78 X10*3/UL (ref 1.6–5.5)
NEUTROPHILS NFR BLD AUTO: 61.6 %
PLATELET # BLD AUTO: 147 X10*3/UL (ref 150–450)
POTASSIUM SERPL-SCNC: 4.3 MMOL/L (ref 3.5–5.3)
PROT SERPL-MCNC: 6.3 G/DL (ref 6.4–8.2)
RBC # BLD AUTO: 4.23 X10*6/UL (ref 4.5–5.9)
SODIUM SERPL-SCNC: 139 MMOL/L (ref 136–145)
WBC # BLD AUTO: 2.9 X10*3/UL (ref 4.4–11.3)

## 2025-05-29 PROCEDURE — 84153 ASSAY OF PSA TOTAL: CPT

## 2025-05-29 PROCEDURE — 84403 ASSAY OF TOTAL TESTOSTERONE: CPT

## 2025-05-29 PROCEDURE — 85025 COMPLETE CBC W/AUTO DIFF WBC: CPT

## 2025-05-29 PROCEDURE — 36415 COLL VENOUS BLD VENIPUNCTURE: CPT

## 2025-05-29 PROCEDURE — 80053 COMPREHEN METABOLIC PANEL: CPT

## 2025-05-30 LAB
PSA SERPL-MCNC: 1.02 NG/ML
TESTOST SERPL-MCNC: <30 NG/DL (ref 240–1000)

## 2025-06-02 DIAGNOSIS — C79.51 PROSTATE CANCER METASTATIC TO BONE (MULTI): ICD-10-CM

## 2025-06-02 DIAGNOSIS — C61 PROSTATE CANCER METASTATIC TO BONE (MULTI): ICD-10-CM

## 2025-06-03 ENCOUNTER — OFFICE VISIT (OUTPATIENT)
Dept: HEMATOLOGY/ONCOLOGY | Facility: CLINIC | Age: 75
End: 2025-06-03
Payer: MEDICARE

## 2025-06-03 VITALS
SYSTOLIC BLOOD PRESSURE: 114 MMHG | DIASTOLIC BLOOD PRESSURE: 73 MMHG | RESPIRATION RATE: 16 BRPM | HEART RATE: 57 BPM | WEIGHT: 167.99 LBS | OXYGEN SATURATION: 96 % | BODY MASS INDEX: 28.39 KG/M2 | TEMPERATURE: 97.2 F

## 2025-06-03 DIAGNOSIS — C79.51 PROSTATE CANCER METASTATIC TO BONE (MULTI): Primary | ICD-10-CM

## 2025-06-03 DIAGNOSIS — C61 PROSTATE CANCER METASTATIC TO BONE (MULTI): Primary | ICD-10-CM

## 2025-06-03 DIAGNOSIS — L30.9 ECZEMA, UNSPECIFIED TYPE: ICD-10-CM

## 2025-06-03 DIAGNOSIS — C61 NEOPLASM OF PROSTATE, DISTANT METASTASIS STAGING CATEGORY M1B: METASTASIS TO BONE (MULTI): ICD-10-CM

## 2025-06-03 DIAGNOSIS — C79.51 NEOPLASM OF PROSTATE, DISTANT METASTASIS STAGING CATEGORY M1B: METASTASIS TO BONE (MULTI): ICD-10-CM

## 2025-06-03 DIAGNOSIS — I82.532 CHRONIC DEEP VEIN THROMBOSIS (DVT) OF POPLITEAL VEIN OF LEFT LOWER EXTREMITY: ICD-10-CM

## 2025-06-03 PROCEDURE — 1126F AMNT PAIN NOTED NONE PRSNT: CPT | Performed by: INTERNAL MEDICINE

## 2025-06-03 PROCEDURE — 1159F MED LIST DOCD IN RCRD: CPT | Performed by: INTERNAL MEDICINE

## 2025-06-03 PROCEDURE — 99214 OFFICE O/P EST MOD 30 MIN: CPT | Performed by: INTERNAL MEDICINE

## 2025-06-03 PROCEDURE — G2211 COMPLEX E/M VISIT ADD ON: HCPCS | Performed by: INTERNAL MEDICINE

## 2025-06-03 PROCEDURE — 1160F RVW MEDS BY RX/DR IN RCRD: CPT | Performed by: INTERNAL MEDICINE

## 2025-06-03 ASSESSMENT — PAIN SCALES - GENERAL: PAINLEVEL_OUTOF10: 0-NO PAIN

## 2025-06-03 NOTE — PROGRESS NOTES
Patient ID: Jose George is a 74 y.o. male.  Referring Physician: Catherine Gamez, APRN-CNP  [address]  [place] [address]  Primary Care Provider: Jatin Soriano DO  Visit Type: Follow Up      Subjective    HPI How was my labwork?    Review of Systems   Constitutional: Negative.    HENT:  Negative.     Eyes: Negative.    Respiratory: Negative.     Cardiovascular: Negative.    Gastrointestinal: Negative.    Endocrine: Negative.    Genitourinary: Negative.     Musculoskeletal: Negative.    Skin: Negative.    Neurological: Negative.    Hematological: Negative.    Psychiatric/Behavioral: Negative.          Objective   BSA: 1.86 meters squared  /73 (BP Location: Right arm, Patient Position: Sitting, BP Cuff Size: Adult)   Pulse 57   Temp 36.2 °C (97.2 °F) (Temporal)   Resp 16   Wt 76.2 kg (167 lb 15.9 oz) Comment: no shoes,coat  SpO2 96%   BMI 28.39 kg/m²      has a past medical history of Allergic contact dermatitis, unspecified cause, Contact with and (suspected) exposure to covid-19 (12/08/2020), Localized edema (03/05/2021), Nodular prostate without lower urinary tract symptoms (03/25/2021), Olecranon bursitis, left elbow (03/05/2021), Personal history of malignant neoplasm of prostate (05/06/2021), Personal history of other diseases of the circulatory system, Personal history of other diseases of the musculoskeletal system and connective tissue, Personal history of other diseases of urinary system (04/26/2021), Personal history of other specified conditions (03/25/2021), and Sprain of unspecified ligament of left ankle, initial encounter (02/23/2021).   has a past surgical history that includes Other surgical history (12/08/2020) and Other surgical history (12/08/2020).  Family History[1]  Oncology History   Neoplasm of prostate, distant metastasis staging category M1b: metastasis to bone (Multi)   10/18/2023 Initial Diagnosis    Neoplasm of prostate, distant metastasis  staging category M1b: metastasis to bone (CMS/HCC)     2/21/2024 -  Chemotherapy    Enzalutamide, 84 Day Cycles     Prostate cancer metastatic to bone (Multi)   12/2/2023 Initial Diagnosis    Prostate cancer metastatic to bone (CMS/HCC)     2/21/2024 -  Chemotherapy    Enzalutamide, 84 Day Cycles         Jose George  reports that he has never smoked. He has never used smokeless tobacco.  He  reports that he does not currently use alcohol.  He  reports that he does not currently use drugs.    Physical Exam  Vitals reviewed.   Constitutional:       Appearance: Normal appearance.   HENT:      Head: Normocephalic.      Mouth/Throat:      Mouth: Mucous membranes are moist.   Eyes:      Extraocular Movements: Extraocular movements intact.      Pupils: Pupils are equal, round, and reactive to light.   Cardiovascular:      Rate and Rhythm: Normal rate and regular rhythm.      Pulses: Normal pulses.      Heart sounds: Normal heart sounds.   Pulmonary:      Effort: Pulmonary effort is normal.      Breath sounds: Normal breath sounds.   Abdominal:      General: Bowel sounds are normal.      Palpations: Abdomen is soft.   Musculoskeletal:         General: Normal range of motion.      Cervical back: Normal range of motion and neck supple.   Skin:     General: Skin is warm.   Neurological:      General: No focal deficit present.      Mental Status: He is alert and oriented to person, place, and time.   Psychiatric:         Mood and Affect: Mood normal.         Behavior: Behavior normal.         WBC   Date/Time Value Ref Range Status   05/29/2025 10:04 AM 2.9 (L) 4.4 - 11.3 x10*3/uL Final   04/16/2025 10:19 AM 3.6 (L) 4.4 - 11.3 x10*3/uL Final   02/13/2025 10:42 AM 3.3 (L) 4.4 - 11.3 x10*3/uL Final     nRBC   Date Value Ref Range Status   09/11/2024   Final     Comment:     Not Measured   02/01/2024 0.0 0.0 - 0.0 /100 WBCs Final   01/03/2023 0.0 0.0 - 0.0 /100 WBC Final   08/29/2022 0.0 0.0 - 0.0 /100 WBC Final   07/14/2022  0.0 0.0 - 0.0 /100 WBC Final     RBC   Date Value Ref Range Status   05/29/2025 4.23 (L) 4.50 - 5.90 x10*6/uL Final   04/16/2025 4.39 (L) 4.50 - 5.90 x10*6/uL Final   02/13/2025 4.33 (L) 4.50 - 5.90 x10*6/uL Final     Hemoglobin   Date Value Ref Range Status   05/29/2025 13.1 (L) 13.5 - 17.5 g/dL Final   04/16/2025 13.7 13.5 - 17.5 g/dL Final   02/13/2025 13.4 (L) 13.5 - 17.5 g/dL Final     Hematocrit   Date Value Ref Range Status   05/29/2025 39.3 (L) 41.0 - 52.0 % Final   04/16/2025 41.3 41.0 - 52.0 % Final   02/13/2025 40.4 (L) 41.0 - 52.0 % Final     MCV   Date/Time Value Ref Range Status   05/29/2025 10:04 AM 93 80 - 100 fL Final   04/16/2025 10:19 AM 94 80 - 100 fL Final   02/13/2025 10:42 AM 93 80 - 100 fL Final     MCH   Date/Time Value Ref Range Status   05/29/2025 10:04 AM 31.0 26.0 - 34.0 pg Final   04/16/2025 10:19 AM 31.2 26.0 - 34.0 pg Final   02/13/2025 10:42 AM 30.9 26.0 - 34.0 pg Final     MCHC   Date/Time Value Ref Range Status   05/29/2025 10:04 AM 33.3 32.0 - 36.0 g/dL Final   04/16/2025 10:19 AM 33.2 32.0 - 36.0 g/dL Final   02/13/2025 10:42 AM 33.2 32.0 - 36.0 g/dL Final     RDW   Date/Time Value Ref Range Status   05/29/2025 10:04 AM 13.5 11.5 - 14.5 % Final   04/16/2025 10:19 AM 13.5 11.5 - 14.5 % Final   02/13/2025 10:42 AM 13.7 11.5 - 14.5 % Final     Platelets   Date/Time Value Ref Range Status   05/29/2025 10:04  (L) 150 - 450 x10*3/uL Final   04/16/2025 10:19  150 - 450 x10*3/uL Final   02/13/2025 10:42  150 - 450 x10*3/uL Final     MPV   Date/Time Value Ref Range Status   10/19/2023 10:08 AM 9.8 7.5 - 11.5 fL Final     Neutrophils %   Date/Time Value Ref Range Status   05/29/2025 10:04 AM 61.6 40.0 - 80.0 % Final   04/16/2025 10:19 AM 65.3 40.0 - 80.0 % Final   02/13/2025 10:42 AM 60.7 40.0 - 80.0 % Final     Immature Granulocytes %, Automated   Date/Time Value Ref Range Status   05/29/2025 10:04 AM 0.3 0.0 - 0.9 % Final     Comment:     Immature Granulocyte Count (IG)  includes promyelocytes, myelocytes and metamyelocytes but does not include bands. Percent differential counts (%) should be interpreted in the context of the absolute cell counts (cells/UL).   04/16/2025 10:19 AM 0.0 0.0 - 0.9 % Final     Comment:     Immature Granulocyte Count (IG) includes promyelocytes, myelocytes and metamyelocytes but does not include bands. Percent differential counts (%) should be interpreted in the context of the absolute cell counts (cells/UL).   02/13/2025 10:42 AM 0.3 0.0 - 0.9 % Final     Comment:     Immature Granulocyte Count (IG) includes promyelocytes, myelocytes and metamyelocytes but does not include bands. Percent differential counts (%) should be interpreted in the context of the absolute cell counts (cells/UL).     Lymphocytes %   Date/Time Value Ref Range Status   05/29/2025 10:04 AM 25.3 13.0 - 44.0 % Final   04/16/2025 10:19 AM 21.3 13.0 - 44.0 % Final   02/13/2025 10:42 AM 20.7 13.0 - 44.0 % Final     Monocytes %   Date/Time Value Ref Range Status   05/29/2025 10:04 AM 9.0 2.0 - 10.0 % Final   04/16/2025 10:19 AM 9.5 2.0 - 10.0 % Final   02/13/2025 10:42 AM 13.1 2.0 - 10.0 % Final     Eosinophils %   Date/Time Value Ref Range Status   05/29/2025 10:04 AM 3.5 0.0 - 6.0 % Final   04/16/2025 10:19 AM 3.6 0.0 - 6.0 % Final   02/13/2025 10:42 AM 4.6 0.0 - 6.0 % Final     Basophils %   Date/Time Value Ref Range Status   05/29/2025 10:04 AM 0.3 0.0 - 2.0 % Final   04/16/2025 10:19 AM 0.3 0.0 - 2.0 % Final   02/13/2025 10:42 AM 0.6 0.0 - 2.0 % Final     Neutrophils Absolute   Date/Time Value Ref Range Status   05/29/2025 10:04 AM 1.78 1.60 - 5.50 x10*3/uL Final     Comment:     Percent differential counts (%) should be interpreted in the context of the absolute cell counts (cells/uL).   04/16/2025 10:19 AM 2.33 1.60 - 5.50 x10*3/uL Final     Comment:     Percent differential counts (%) should be interpreted in the context of the absolute cell counts (cells/uL).   02/13/2025 10:42 AM  "1.99 1.60 - 5.50 x10*3/uL Final     Comment:     Percent differential counts (%) should be interpreted in the context of the absolute cell counts (cells/uL).     Immature Granulocytes Absolute, Automated   Date/Time Value Ref Range Status   05/29/2025 10:04 AM 0.01 0.00 - 0.50 x10*3/uL Final   04/16/2025 10:19 AM 0.00 0.00 - 0.50 x10*3/uL Final   02/13/2025 10:42 AM 0.01 0.00 - 0.50 x10*3/uL Final     Lymphocytes Absolute   Date/Time Value Ref Range Status   05/29/2025 10:04 AM 0.73 (L) 0.80 - 3.00 x10*3/uL Final   04/16/2025 10:19 AM 0.76 (L) 0.80 - 3.00 x10*3/uL Final   02/13/2025 10:42 AM 0.68 (L) 0.80 - 3.00 x10*3/uL Final     Monocytes Absolute   Date/Time Value Ref Range Status   05/29/2025 10:04 AM 0.26 0.05 - 0.80 x10*3/uL Final   04/16/2025 10:19 AM 0.34 0.05 - 0.80 x10*3/uL Final   02/13/2025 10:42 AM 0.43 0.05 - 0.80 x10*3/uL Final     Eosinophils Absolute   Date/Time Value Ref Range Status   05/29/2025 10:04 AM 0.10 0.00 - 0.40 x10*3/uL Final   04/16/2025 10:19 AM 0.13 0.00 - 0.40 x10*3/uL Final   02/13/2025 10:42 AM 0.15 0.00 - 0.40 x10*3/uL Final     Basophils Absolute   Date/Time Value Ref Range Status   05/29/2025 10:04 AM 0.01 0.00 - 0.10 x10*3/uL Final   04/16/2025 10:19 AM 0.01 0.00 - 0.10 x10*3/uL Final   02/13/2025 10:42 AM 0.02 0.00 - 0.10 x10*3/uL Final       No components found for: \"PT\"  aPTT   Date/Time Value Ref Range Status   05/19/2022 10:56 AM 31 26 - 39 sec Final     Comment:       THE APTT IS NO LONGER USED FOR MONITORING     UNFRACTIONATED HEPARIN THERAPY.    FOR MONITORING HEPARIN THERAPY,     USE THE HEPARIN ASSAY.         Assessment/Plan    1) prostate cancer  -here for interval followup  -continues to take Xtandi 160 mg, dailly  -last lupron 6 month depot injection received 5/21/2024  ; next lupron dose due 11/2024  -PSMA PET done on 5/10/2024 reviewed-no abnormal focal Ga68 PSMA uptake in neck and chest; no abnormal Ga68 PSMA uptake within abdominal lymph nodes; mild " heterogeneous Ga68 uptake seen in prostate gland with SUV of 1.9; no abnormal Ga68 PSMA uptake is seen within periprostatic and pelvic lymph nodes; there is markedly intense gallium 68 PSMA uptake in L5-- previously noted foci on 2021 bone scan in bilateral acetabulae, right ischium and sacrum are not evidence of current PET  -as he has such limited new disease, I don't think we need to radically alter his treatment (such as switch to chemo with docetaxel)  -Advised patient to continue Xtandi without change for now  -will refer to radiation oncology to consider treatment of L5 metastasis  -per Dr Smith, patient's insurance initially rejected requested for SBRT, however, Dr Smith was able to appeal the decision, and so SBRT is now approved--patient is awaiting start of SBRT (2 fractions planned)  -completed SBRT (7/3 + 7/5/2024)  -continues on xtandi, however, he received a letter from [employer] stating that he will no longer be eligible for patient assistance program after 12/31/2024 (he is over-income)  -here for interval followup  -labs done on 11/1/2024 included CBC, COMP, PSA, testosterone  -results reviewed--wbc 2.9, hgb 13.1, plt 147,000, ANC 1780, creatinine 1.01, alk phos 41, AST 20, ALT 9, PSA 1.02, testosterone <30  -advised Bill to continue with xtandi  -he is also due for 6 month lupron depot in 4 months   -will see him again in 2 months     2) eczema  -on dupixent     3) left leg DVT  -on eliquis  -as he now has been confirmed to have prostate cancer, the cancer was most likely the provoking factor the DVT, as 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     4) bone metastases  -secondary to prostate cancer  -as androgen deprivation therapy is expected to diminish bone density over time, will consider addition of either bisphosphonate (zometa) or RANKL inhibitor (xgeva) to help build back bone density and to minimize risk for pathologic fracture  -he recently fell and broke his right elbow -->  injured/twisted his left hip in the process  -DEXA was booked for 1/10/2024 but he cancelled appointment because he caught Covid  -DEXA done on 3/8/2024 consistent with osteoporosis        Problem List Items Addressed This Visit           ICD-10-CM    Prostate cancer metastatic to bone (Multi) C61, C79.51            Gabe Garza MD                                [1]   Family History  Problem Relation Name Age of Onset    Diabetes Mother      Hypertension Mother      Heart attack Mother      Obesity Mother      Other (cardiac disorder) Mother      Diabetes Father      Hypertension Father      Other (cardiac disorder) Father      Lung disease Father      Obesity Father

## 2025-06-04 ASSESSMENT — ENCOUNTER SYMPTOMS
MUSCULOSKELETAL NEGATIVE: 1
RESPIRATORY NEGATIVE: 1
CONSTITUTIONAL NEGATIVE: 1
PSYCHIATRIC NEGATIVE: 1
HEMATOLOGIC/LYMPHATIC NEGATIVE: 1
GASTROINTESTINAL NEGATIVE: 1
NEUROLOGICAL NEGATIVE: 1
EYES NEGATIVE: 1
ENDOCRINE NEGATIVE: 1
CARDIOVASCULAR NEGATIVE: 1

## 2025-06-13 ENCOUNTER — SPECIALTY PHARMACY (OUTPATIENT)
Dept: PHARMACY | Facility: CLINIC | Age: 75
End: 2025-06-13

## 2025-06-13 PROCEDURE — RXMED WILLOW AMBULATORY MEDICATION CHARGE

## 2025-06-16 ENCOUNTER — PHARMACY VISIT (OUTPATIENT)
Dept: PHARMACY | Facility: CLINIC | Age: 75
End: 2025-06-16
Payer: COMMERCIAL

## 2025-07-14 PROCEDURE — RXMED WILLOW AMBULATORY MEDICATION CHARGE

## 2025-07-15 ENCOUNTER — SPECIALTY PHARMACY (OUTPATIENT)
Dept: PHARMACY | Facility: CLINIC | Age: 75
End: 2025-07-15

## 2025-07-17 ENCOUNTER — PHARMACY VISIT (OUTPATIENT)
Dept: PHARMACY | Facility: CLINIC | Age: 75
End: 2025-07-17
Payer: COMMERCIAL

## 2025-07-30 ENCOUNTER — LAB (OUTPATIENT)
Dept: LAB | Facility: CLINIC | Age: 75
End: 2025-07-30
Payer: MEDICARE

## 2025-07-30 DIAGNOSIS — C79.51 PROSTATE CANCER METASTATIC TO BONE (MULTI): ICD-10-CM

## 2025-07-30 DIAGNOSIS — C61 PROSTATE CANCER METASTATIC TO BONE (MULTI): ICD-10-CM

## 2025-07-30 LAB
ALBUMIN SERPL BCP-MCNC: 3.9 G/DL (ref 3.4–5)
ALP SERPL-CCNC: 51 U/L (ref 33–136)
ALT SERPL W P-5'-P-CCNC: 9 U/L (ref 10–52)
ANION GAP SERPL CALC-SCNC: 11 MMOL/L (ref 10–20)
AST SERPL W P-5'-P-CCNC: 14 U/L (ref 9–39)
BASOPHILS # BLD AUTO: 0.02 X10*3/UL (ref 0–0.1)
BASOPHILS NFR BLD AUTO: 0.6 %
BILIRUB SERPL-MCNC: 0.5 MG/DL (ref 0–1.2)
BUN SERPL-MCNC: 18 MG/DL (ref 6–23)
CALCIUM SERPL-MCNC: 9 MG/DL (ref 8.6–10.3)
CHLORIDE SERPL-SCNC: 105 MMOL/L (ref 98–107)
CO2 SERPL-SCNC: 26 MMOL/L (ref 21–32)
CREAT SERPL-MCNC: 0.94 MG/DL (ref 0.5–1.3)
EGFRCR SERPLBLD CKD-EPI 2021: 86 ML/MIN/1.73M*2
EOSINOPHIL # BLD AUTO: 0.1 X10*3/UL (ref 0–0.4)
EOSINOPHIL NFR BLD AUTO: 3 %
ERYTHROCYTE [DISTWIDTH] IN BLOOD BY AUTOMATED COUNT: 13.5 % (ref 11.5–14.5)
GLUCOSE SERPL-MCNC: 86 MG/DL (ref 74–99)
HCT VFR BLD AUTO: 38.9 % (ref 41–52)
HGB BLD-MCNC: 13.1 G/DL (ref 13.5–17.5)
IMM GRANULOCYTES # BLD AUTO: 0.02 X10*3/UL (ref 0–0.5)
IMM GRANULOCYTES NFR BLD AUTO: 0.6 % (ref 0–0.9)
LYMPHOCYTES # BLD AUTO: 0.81 X10*3/UL (ref 0.8–3)
LYMPHOCYTES NFR BLD AUTO: 24.6 %
MCH RBC QN AUTO: 31.3 PG (ref 26–34)
MCHC RBC AUTO-ENTMCNC: 33.7 G/DL (ref 32–36)
MCV RBC AUTO: 93 FL (ref 80–100)
MONOCYTES # BLD AUTO: 0.28 X10*3/UL (ref 0.05–0.8)
MONOCYTES NFR BLD AUTO: 8.5 %
NEUTROPHILS # BLD AUTO: 2.06 X10*3/UL (ref 1.6–5.5)
NEUTROPHILS NFR BLD AUTO: 62.7 %
NRBC BLD-RTO: ABNORMAL /100{WBCS}
PLATELET # BLD AUTO: 197 X10*3/UL (ref 150–450)
POTASSIUM SERPL-SCNC: 4 MMOL/L (ref 3.5–5.3)
PROT SERPL-MCNC: 6.2 G/DL (ref 6.4–8.2)
PSA SERPL-MCNC: 1.4 NG/ML
RBC # BLD AUTO: 4.19 X10*6/UL (ref 4.5–5.9)
SODIUM SERPL-SCNC: 138 MMOL/L (ref 136–145)
WBC # BLD AUTO: 3.3 X10*3/UL (ref 4.4–11.3)

## 2025-07-30 PROCEDURE — 85025 COMPLETE CBC W/AUTO DIFF WBC: CPT

## 2025-07-30 PROCEDURE — 84153 ASSAY OF PSA TOTAL: CPT

## 2025-07-30 PROCEDURE — 36415 COLL VENOUS BLD VENIPUNCTURE: CPT

## 2025-07-30 PROCEDURE — 80053 COMPREHEN METABOLIC PANEL: CPT

## 2025-08-01 ASSESSMENT — ENCOUNTER SYMPTOMS
HEMATURIA: 0
DIZZINESS: 1
HOT FLASHES: 0
GASTROINTESTINAL NEGATIVE: 1
DECREASED CONCENTRATION: 1
FATIGUE: 0
RESPIRATORY NEGATIVE: 1
CARDIOVASCULAR NEGATIVE: 1
FEVER: 0
ARTHRALGIAS: 1
APPETITE CHANGE: 0
HEMATOLOGIC/LYMPHATIC NEGATIVE: 1
EYES NEGATIVE: 1
UNEXPECTED WEIGHT CHANGE: 0
WOUND: 0

## 2025-08-01 NOTE — PROGRESS NOTES
Patient ID: Jose George is a 74 y.o. male.  Diagnosis:  Prostate Cancer   MedOn: Dr. Garza  Primary Care Provider: Jatin Soriano DO  Referring Provider: Gabe Garza MD  [address] [place] [address]  Visit Type: Follow Up    Date of Service: 08/05/25  Location[facility]     Patient Care Team:  Jatin Soriano DO as PCP - General (Family Medicine)  Jatin Soriano DO as PCP - O Medicare Advantage PCP  Gabe Garza MD as Consulting Physician (Hematology and Oncology)    Current Therapy: enzalutamide and ADT     ONCOLOGIC HISTORY     No matching staging information was found for the patient.    Prostate Cancer   8/17/2006: PSA 0.9  2/23/2021: urinary frequency and slow flow;   3/5/2021: left leg swelling--doppler showing occlusive deep vein thrombosis --> started on eliquis  3/25/2021: Prostate bx with Dr. Dumont   4/15/2021: started casodex  4/30/2021: CT scan and bone scan with extraprostatic spread of disease involving the mesorectal fascia as well as a mesorectal fat with close proximity to the rectum without obvious involvement of the rectum. Extensive abdominopelvic lymphadenopathy consistent with alva metastasis; acetabula  (right greater than left), right ischium, and the sacrum bone involvement   5/4/2021: ADT started   5/2022: started enzalutamide   5/10/2024: PSMA PET -no abnormal focal Ga68 PSMA uptake in neck and chest; no abnormal Ga68 PSMA uptake within abdominal lymph nodes; mild heterogeneous Ga68 uptake seen in prostate gland with SUV of 1.9; no abnormal Ga68 PSMA uptake is seen within periprostatic and pelvic lymph nodes; there is markedly intense gallium 68 PSMA uptake in L5-- previously noted foci on 2021 bone scan in bilateral acetabulae, right ischium and sacrum are not evidence of current PET  -as he has such limited new disease, I don't think we need to radically alter his treatment (such as switch to chemo with  docetaxel)  7/2024: SBRT to L5    Bone metastases  -secondary to prostate cancer  -as androgen deprivation therapy is expected to diminish bone density over time, will consider addition of either bisphosphonate (zometa) or RANKL inhibitor (xgeva) to help build back bone density and to minimize risk for pathologic fracture  -10/12/2023 fell and broke his right elbow --> injured/twisted his left hip in the process  -DEXA was booked for 1/10/2024 but he cancelled appointment because he caught Covid  -DEXA done on 3/8/2024 consistent with osteoporosis    Oncology History   Neoplasm of prostate, 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 male who presents today for follow up of prostate cancer. Patient of Dr. Garza currently on enzalutamide and ADT. Tolerating treatment well with no new toxicities. Since my last visit with him he completed PT for his back and is doing exercises at home. He has noticed improvement. He has arthritic pain in his shoulders. He denies urinary symptoms other than nocturia and denies GI symptoms. Denies hot flashes. No issues with taking Xtandi daily. He has eczema being treated with Dupixent. Concerned about PSA going up.     Review of Systems   Constitutional:  Negative for appetite change, fatigue, fever and unexpected weight change.   HENT:  Negative.     Eyes: Negative.    Respiratory: Negative.     Cardiovascular: Negative.    Gastrointestinal: Negative.    Endocrine:  Negative for hot flashes.   Genitourinary: Negative.  Negative for hematuria.    Musculoskeletal:  Positive for arthralgias and gait problem.   Skin:  Negative for rash and wound.   Neurological:  Positive for dizziness and gait problem.   Hematological: Negative.    Psychiatric/Behavioral:  Positive for decreased concentration.      Objective      /80   Pulse 75   Temp 36.3 °C (97.3 °F)   Resp 17   Wt 73.8 kg (162 lb 11.2 oz)   SpO2 97%   BMI 27.50 kg/m²   BSA: 1.83 meters squared    Wt Readings from Last 5 Encounters:   08/05/25 73.8 kg (162 lb 11.2 oz)   06/03/25 76.2 kg (167 lb 15.9 oz)   04/22/25 75.6 kg (166 lb 11.2 oz)   03/27/25 77.6 kg (171 lb)   02/19/25 77.7 kg (171 lb 4.8 oz)     Performance Status:  ECOG Score: 1- Restricted in physically strenuous activity.  Carries out light duty.  Karnofsky Score: 90 - Able to carry on normal activity; minor signs or symptoms of disease     PHYSICAL EXAM   Physical Exam  Constitutional:       General: He is not in acute distress.  HENT:      Head: Normocephalic and atraumatic.      Mouth/Throat:      Mouth: Mucous membranes are moist.      Pharynx: Oropharynx is clear.     Eyes:      Pupils: Pupils are equal, round, and reactive to light.     Pulmonary:      Effort: Pulmonary effort is normal.     Musculoskeletal:         General: Normal range of motion.      Cervical back: Normal range of motion.     Neurological:      General: No focal deficit present.      Mental Status: He is alert and oriented to person, place, and time.      Motor: No weakness.     Psychiatric:         Mood and Affect: Mood normal.         Behavior: Behavior normal.         Thought Content: Thought content normal.         Judgment: Judgment normal.     Allergies  Allergies   Allergen Reactions    Pollen Extracts Headache     Sinus headache, itchy watery eyes      Medications  Current Outpatient Medications   Medication Instructions    dupilumab (Dupixent Pen) 300 mg/2 mL injection  Once    leuprolide, 6-month, (Lupron Depot, 6 Month,) 45 mg injection Every 6 months    Xtandi 160 mg, oral, Daily, Take with or without food at the same time each day. Do not crush, break, or dissolve. Swallow it whole.        Diagnostic Results   RESULTS     No results found for this or any previous visit (from the past 96 hours).   Latest Reference Range & Units 07/30/25 10:31   GLUCOSE 74 - 99 mg/dL 86   SODIUM 136 - 145 mmol/L 138   POTASSIUM 3.5 - 5.3 mmol/L 4.0   CHLORIDE 98 - 107 mmol/L 105   Bicarbonate 21 - 32 mmol/L 26   Anion Gap 10 - 20 mmol/L 11   Blood Urea Nitrogen 6 - 23 mg/dL 18   Creatinine 0.50 - 1.30 mg/dL 0.94   EGFR >60 mL/min/1.73m*2 86   Calcium 8.6 - 10.3 mg/dL 9.0   Albumin 3.4 - 5.0 g/dL 3.9   Alkaline Phosphatase 33 - 136 U/L 51   ALT 10 - 52 U/L 9 (L)   AST 9 - 39 U/L 14   Bilirubin Total 0.0 - 1.2 mg/dL 0.5   (L): Data is abnormally low   Latest Reference Range & Units 07/30/25 10:31   WBC 4.4 - 11.3 x10*3/uL 3.3 (L)   nRBC  COMMENT ONLY   RBC 4.50 - 5.90 x10*6/uL 4.19 (L)   HEMOGLOBIN 13.5 - 17.5 g/dL 13.1 (L)   HEMATOCRIT 41.0 - 52.0 % 38.9 (L)   MCV 80 - 100 fL 93   MCH 26.0 - 34.0 pg 31.3   MCHC 32.0 - 36.0 g/dL 33.7   RED CELL DISTRIBUTION WIDTH 11.5 - 14.5 % 13.5   Platelets 150 - 450 x10*3/uL 197   Neutrophils % 40.0 - 80.0 % 62.7   Immature Granulocytes %, Automated 0.0 - 0.9 % 0.6   Lymphocytes % 13.0 - 44.0 % 24.6   Monocytes % 2.0 - 10.0 % 8.5   Eosinophils % 0.0 - 6.0 % 3.0   Basophils % 0.0 - 2.0 % 0.6   Neutrophils Absolute 1.60 - 5.50 x10*3/uL 2.06   Immature Granulocytes Absolute, Automated 0.00 - 0.50 x10*3/uL 0.02   Lymphocytes Absolute 0.80 - 3.00 x10*3/uL 0.81   Monocytes Absolute 0.05 - 0.80 x10*3/uL 0.28   Eosinophils Absolute 0.00 - 0.40 x10*3/uL 0.10   Basophils Absolute 0.00 - 0.10 x10*3/uL 0.02   (L): Data is abnormally low    Lab Results   Component Value Date    PSA 1.40 07/30/2025    PSA 1.02 05/29/2025    PSA 0.93 04/16/2025     Lab Results   Component  Value Date    TESTOSTERONE <30 (L) 05/29/2025     Recent Imaging     3/28/2025 Thoracic spine X Ray  IMPRESSION:  Mild kyphosis. No acute abnormality seen radiographically.  Partially visualized sclerotic lesion in the proximal humerus    4/2/2025 Humerus Right X Ray  IMPRESSION:  No acute abnormality seen. Moderate degenerative changes the  glenohumeral joint. Olecranon fracture fixation noted    5/10/2024 PSMA PET   IMPRESSION:  Mild heterogeneous Ga68 PSMA uptake seen in the prostate gland.  Finding is nonspecific.      Focus of markedly intense killing 68 PSMA uptake in the region of L5.  Finding would be consistent an osseous metastatic focus.      It should be noted that the previously noted foci (on prior bone  scan) in the bilateral acetabulae, right ischium and sacrum are not  readily evident on PSMA PET-CT.    Assessment/Plan   ASSESSMENT     Jose George is a 74 y.o. male with high grade prostate cancer (GS 9, iPSA 160) bone and node involvement who presents in follow up on enzalutamide and ADT since 2022. Last PET in May of 2024, he was treated with SBRT to L5 in July.     He is tolerating ADT and enzalutamide well with some brain fog and balance issues/dizziness.    CBC and CMP are unremarkable.   PSA trended down from 11 to 0.54 post XRT in July. Now moving up slightly to 1.40;  no new symptoms or concerns. We discussed the significance of this and how we will continue to monitor.  Last T suppressed.   Back pain improved with PT.     PLAN     # Prostate Cancer   - Continue enzalutamide 160 mg daily  - ADT every 6 months - due 10/7    - CBC, CMP, PSA every ~ 6-8 weeks.     # Back pain  - Continue PT exercises     # Brain Fog/Balance Issues   - Continue strengthening exercises  - Puzzles and reading    Follow up in October with next ADT injection and labs.     Jose was seen today for follow-up.  Diagnoses and all orders for this visit:  Prostate cancer metastatic to bone (Multi) (Primary)  -      CBC and Auto Differential; Future  -     Comprehensive Metabolic Panel; Future  -     Prostate Specific Antigen; Future  -     Clinic Appointment Request Follow Up; TONNY BLACKBURN; [facility]      Enzalutamide, 84 Day Cycles  Leuprolide Acetate, Every 24 Weeks  Venous Access Orders    Patient verbalizes understanding of above plan. Time provided for patient's questions. All questions answered to patient's satisfaction in office. Patient instructed to reach out for any new concerning issues at [phone].    Tonny Blackburn MSN, APRN, A-GNP-C, AOCNP  [facility]  Division of Hematology & Medical Oncology   [place] [address]  Phone: [phone]  Available via [employer] Secure Chat  [email or web address]

## 2025-08-05 ENCOUNTER — OFFICE VISIT (OUTPATIENT)
Dept: HEMATOLOGY/ONCOLOGY | Facility: CLINIC | Age: 75
End: 2025-08-05
Payer: MEDICARE

## 2025-08-05 VITALS
SYSTOLIC BLOOD PRESSURE: 113 MMHG | TEMPERATURE: 97.3 F | OXYGEN SATURATION: 97 % | RESPIRATION RATE: 17 BRPM | WEIGHT: 162.7 LBS | DIASTOLIC BLOOD PRESSURE: 80 MMHG | HEART RATE: 75 BPM | BODY MASS INDEX: 27.5 KG/M2

## 2025-08-05 DIAGNOSIS — C79.51 PROSTATE CANCER METASTATIC TO BONE (MULTI): Primary | ICD-10-CM

## 2025-08-05 DIAGNOSIS — C61 PROSTATE CANCER METASTATIC TO BONE (MULTI): Primary | ICD-10-CM

## 2025-08-05 PROCEDURE — 1126F AMNT PAIN NOTED NONE PRSNT: CPT

## 2025-08-05 PROCEDURE — 99213 OFFICE O/P EST LOW 20 MIN: CPT

## 2025-08-05 PROCEDURE — 1159F MED LIST DOCD IN RCRD: CPT

## 2025-08-05 PROCEDURE — 1160F RVW MEDS BY RX/DR IN RCRD: CPT

## 2025-08-05 ASSESSMENT — ENCOUNTER SYMPTOMS
DEPRESSION: 0
OCCASIONAL FEELINGS OF UNSTEADINESS: 0
LOSS OF SENSATION IN FEET: 0

## 2025-08-05 ASSESSMENT — PAIN SCALES - GENERAL: PAINLEVEL_OUTOF10: 0-NO PAIN

## 2025-08-05 ASSESSMENT — COLUMBIA-SUICIDE SEVERITY RATING SCALE - C-SSRS
2. HAVE YOU ACTUALLY HAD ANY THOUGHTS OF KILLING YOURSELF?: NO
6. HAVE YOU EVER DONE ANYTHING, STARTED TO DO ANYTHING, OR PREPARED TO DO ANYTHING TO END YOUR LIFE?: NO
1. IN THE PAST MONTH, HAVE YOU WISHED YOU WERE DEAD OR WISHED YOU COULD GO TO SLEEP AND NOT WAKE UP?: NO

## 2025-08-05 ASSESSMENT — PATIENT HEALTH QUESTIONNAIRE - PHQ9
1. LITTLE INTEREST OR PLEASURE IN DOING THINGS: NOT AT ALL
2. FEELING DOWN, DEPRESSED OR HOPELESS: NOT AT ALL
SUM OF ALL RESPONSES TO PHQ9 QUESTIONS 1 AND 2: 0

## 2025-08-12 PROCEDURE — RXMED WILLOW AMBULATORY MEDICATION CHARGE

## 2025-08-15 ENCOUNTER — SPECIALTY PHARMACY (OUTPATIENT)
Dept: PHARMACY | Facility: CLINIC | Age: 75
End: 2025-08-15

## 2025-08-18 ENCOUNTER — PHARMACY VISIT (OUTPATIENT)
Dept: PHARMACY | Facility: CLINIC | Age: 75
End: 2025-08-18
Payer: COMMERCIAL